# Patient Record
Sex: MALE | Race: WHITE | NOT HISPANIC OR LATINO | Employment: FULL TIME | URBAN - METROPOLITAN AREA
[De-identification: names, ages, dates, MRNs, and addresses within clinical notes are randomized per-mention and may not be internally consistent; named-entity substitution may affect disease eponyms.]

---

## 2017-06-09 ENCOUNTER — TELEPHONE (OUTPATIENT)
Dept: INTERNAL MEDICINE | Facility: CLINIC | Age: 37
End: 2017-06-09

## 2017-06-09 NOTE — TELEPHONE ENCOUNTER
Pt cancelled appt 2 minutes before appt time. appt was added back to schedule due to cancelling appt at too late notice.

## 2017-06-09 NOTE — TELEPHONE ENCOUNTER
----- Message from Lindsay Valentin sent at 6/9/2017  7:28 AM CDT -----  Contact: Patients wife  Pt wife called at 7:27 stating they ashley be approx 15 min late, was told that after that time reschedule will be needed, voiced mercedes/omid

## 2017-06-30 ENCOUNTER — LAB VISIT (OUTPATIENT)
Dept: LAB | Facility: HOSPITAL | Age: 37
End: 2017-06-30
Attending: FAMILY MEDICINE
Payer: COMMERCIAL

## 2017-06-30 ENCOUNTER — OFFICE VISIT (OUTPATIENT)
Dept: INTERNAL MEDICINE | Facility: CLINIC | Age: 37
End: 2017-06-30
Payer: COMMERCIAL

## 2017-06-30 VITALS
BODY MASS INDEX: 27.11 KG/M2 | SYSTOLIC BLOOD PRESSURE: 132 MMHG | DIASTOLIC BLOOD PRESSURE: 84 MMHG | WEIGHT: 204.56 LBS | TEMPERATURE: 99 F | HEIGHT: 73 IN

## 2017-06-30 DIAGNOSIS — R19.7 DIARRHEA, UNSPECIFIED TYPE: Primary | ICD-10-CM

## 2017-06-30 DIAGNOSIS — F90.9 HYPERACTIVITY: ICD-10-CM

## 2017-06-30 DIAGNOSIS — K21.9 GASTROESOPHAGEAL REFLUX DISEASE, ESOPHAGITIS PRESENCE NOT SPECIFIED: ICD-10-CM

## 2017-06-30 DIAGNOSIS — R19.7 DIARRHEA, UNSPECIFIED TYPE: ICD-10-CM

## 2017-06-30 DIAGNOSIS — Z80.0 FAMILY HX OF COLON CANCER: ICD-10-CM

## 2017-06-30 LAB
ALBUMIN SERPL BCP-MCNC: 4.1 G/DL
ALP SERPL-CCNC: 64 U/L
ALT SERPL W/O P-5'-P-CCNC: 22 U/L
ANION GAP SERPL CALC-SCNC: 8 MMOL/L
AST SERPL-CCNC: 23 U/L
BASOPHILS # BLD AUTO: 0.04 K/UL
BASOPHILS NFR BLD: 0.5 %
BILIRUB SERPL-MCNC: 0.3 MG/DL
BUN SERPL-MCNC: 13 MG/DL
CALCIUM SERPL-MCNC: 9.9 MG/DL
CHLORIDE SERPL-SCNC: 105 MMOL/L
CO2 SERPL-SCNC: 27 MMOL/L
CREAT SERPL-MCNC: 1.3 MG/DL
DIFFERENTIAL METHOD: ABNORMAL
EOSINOPHIL # BLD AUTO: 0.3 K/UL
EOSINOPHIL NFR BLD: 2.8 %
ERYTHROCYTE [DISTWIDTH] IN BLOOD BY AUTOMATED COUNT: 12.9 %
EST. GFR  (AFRICAN AMERICAN): >60 ML/MIN/1.73 M^2
EST. GFR  (NON AFRICAN AMERICAN): >60 ML/MIN/1.73 M^2
GLUCOSE SERPL-MCNC: 83 MG/DL
HCT VFR BLD AUTO: 41.9 %
HGB BLD-MCNC: 14.3 G/DL
IGA SERPL-MCNC: 179 MG/DL
LYMPHOCYTES # BLD AUTO: 1.9 K/UL
LYMPHOCYTES NFR BLD: 21.3 %
MCH RBC QN AUTO: 31.8 PG
MCHC RBC AUTO-ENTMCNC: 34.1 %
MCV RBC AUTO: 93 FL
MONOCYTES # BLD AUTO: 0.7 K/UL
MONOCYTES NFR BLD: 8 %
NEUTROPHILS # BLD AUTO: 5.9 K/UL
NEUTROPHILS NFR BLD: 66.8 %
PLATELET # BLD AUTO: 311 K/UL
PMV BLD AUTO: 10.7 FL
POTASSIUM SERPL-SCNC: 3.8 MMOL/L
PROT SERPL-MCNC: 7.4 G/DL
RBC # BLD AUTO: 4.49 M/UL
SODIUM SERPL-SCNC: 140 MMOL/L
TSH SERPL DL<=0.005 MIU/L-ACNC: 1.67 UIU/ML
WBC # BLD AUTO: 8.82 K/UL

## 2017-06-30 PROCEDURE — 36415 COLL VENOUS BLD VENIPUNCTURE: CPT | Mod: PO

## 2017-06-30 PROCEDURE — 99214 OFFICE O/P EST MOD 30 MIN: CPT | Mod: S$GLB,,, | Performed by: FAMILY MEDICINE

## 2017-06-30 PROCEDURE — 99999 PR PBB SHADOW E&M-EST. PATIENT-LVL III: CPT | Mod: PBBFAC,,, | Performed by: FAMILY MEDICINE

## 2017-06-30 PROCEDURE — 80053 COMPREHEN METABOLIC PANEL: CPT

## 2017-06-30 PROCEDURE — 83516 IMMUNOASSAY NONANTIBODY: CPT

## 2017-06-30 PROCEDURE — 85025 COMPLETE CBC W/AUTO DIFF WBC: CPT

## 2017-06-30 PROCEDURE — 82784 ASSAY IGA/IGD/IGG/IGM EACH: CPT

## 2017-06-30 PROCEDURE — 84443 ASSAY THYROID STIM HORMONE: CPT

## 2017-06-30 RX ORDER — PANTOPRAZOLE SODIUM 40 MG/1
40 TABLET, DELAYED RELEASE ORAL DAILY
Qty: 30 TABLET | Refills: 3 | Status: SHIPPED | OUTPATIENT
Start: 2017-06-30 | End: 2017-08-04 | Stop reason: SDUPTHER

## 2017-06-30 NOTE — PROGRESS NOTES
"Subjective:      Patient ID: Eugenie Bennett is a 36 y.o. male.    Chief Complaint: Abdominal Pain; Gastroesophageal Reflux; and Diarrhea    HPI  37 yo male unknown to me here today with his wife (my Patient).  They report that he has chronically had problems with terrible indigestion/acid reflux.  He will burp at times and actually get up balls of mucous that are foul smelling.  He will burp sour smelling as well.  He knows he has to avoid coffee and red sauces.  He has been taking NSAIDs daily for sleep/aches/pains.   He also will get terrible diarrhea off and on.  About 1-2 times a month his stomach gets so bad and the diarrhea that he can't go to work or leave the house.  Occ will have night sweats.  Denies weight loss.  Says he is actually up on weight.  No blood in the stool.    OTC meds for GERD, not helpful.  He has a whole drawer full.  Unsure if certain foods effect the stool, he knows it does effect the GERD.  Of note, mother  at 47 of colon cancer.  He has not been screened.    Past Medical History:   Diagnosis Date    GERD (gastroesophageal reflux disease)      Family History   Problem Relation Age of Onset    Colon cancer Mother       at 47    Cancer Maternal Grandmother      unsure of type    Alcohol abuse Maternal Grandfather      Cancer unknown type     Past Surgical History:   Procedure Laterality Date    ADENOIDECTOMY      TONSILLECTOMY      TYMPANOSTOMY TUBE PLACEMENT      x 2     Social History   Substance Use Topics    Smoking status: Never Smoker    Smokeless tobacco: Current User     Types: Chew      Comment: Smoker for maybe a year    Alcohol use 7.2 oz/week     12 Cans of beer per week       /84 (BP Location: Left arm, Patient Position: Sitting, BP Method: Manual)   Temp 98.8 °F (37.1 °C) (Tympanic)   Ht 6' 1" (1.854 m)   Wt 92.8 kg (204 lb 9.4 oz)   BMI 26.99 kg/m²     Review of Systems   Constitutional: Negative for chills and fever.   Respiratory: Negative.  "   Cardiovascular: Negative.    Gastrointestinal: Positive for abdominal distention, diarrhea and nausea. Negative for anal bleeding and vomiting.   Endocrine: Positive for polydipsia.   Psychiatric/Behavioral:        Reports that he is very hyper, always has been but his work is telling him he needs to be evaluated.  Asking for a referral to do this.     Objective:     Physical Exam   Constitutional: He is oriented to person, place, and time. He appears well-developed and well-nourished.   HENT:   Mouth/Throat: Oropharynx is clear and moist.   Cardiovascular: Normal rate, regular rhythm and normal heart sounds.    Pulmonary/Chest: Effort normal and breath sounds normal. No respiratory distress. He has no wheezes.   Abdominal: Soft. Bowel sounds are normal. He exhibits no distension and no mass. There is no tenderness. There is no guarding.   Neurological: He is alert and oriented to person, place, and time.   Psychiatric: He has a normal mood and affect. His behavior is normal. Judgment and thought content normal.   Pt is full of energy/hyper and likes to talk   Nursing note and vitals reviewed.      No results found for: WBC, HGB, HCT, PLT, CHOL, TRIG, HDL, LDLDIRECT, ALT, AST, NA, K, CL, CREATININE, BUN, CO2, TSH, PSA, INR, GLUF, HGBA1C, MICROALBUR    Assessment:     1. Diarrhea, unspecified type    2. Gastroesophageal reflux disease, esophagitis presence not specified    3. Family hx of colon cancer    4. Hyperactivity       Plan:   Diarrhea, unspecified type  -     CBC auto differential; Future; Expected date: 06/30/2017  -     Comprehensive metabolic panel; Future; Expected date: 06/30/2017  -     TSH; Future; Expected date: 06/30/2017  -     Tissue transglutaminase, IgA; Future; Expected date: 06/30/2017  -     IgA; Future; Expected date: 06/30/2017  -     Ambulatory consult to Gastroenterology    Gastroesophageal reflux disease, esophagitis presence not specified  -     Ambulatory consult to  Gastroenterology    Family hx of colon cancer  -     Ambulatory consult to Gastroenterology    Hyperactivity  -     Ambulatory referral to Psychology    Other orders  -     pantoprazole (PROTONIX) 40 MG tablet; Take 1 tablet (40 mg total) by mouth once daily.  Dispense: 30 tablet; Refill: 3    Start daily Protonix 40mg.  Anti-GERD diet given to him.  Avoid NSAIDs.  Use melatonin for sleep.  Check CBC/CmP/TSH/celiac panel  Given his length of time with symptoms and fam hx, needs GI consultation for further evaluation and likely EGD/Cscope.  Referral to Psychology for evaluation  F/u 6 wks

## 2017-06-30 NOTE — PATIENT INSTRUCTIONS
Tips to Control Acid Reflux    To control acid reflux, youll need to make some basic diet and lifestyle changes. The simple steps outlined below may be all youll need to ease discomfort.  Watch what you eat  · Avoid fatty foods and spicy foods.  · Eat fewer acidic foods, such as citrus and tomato-based foods. These can increase symptoms.  · Limit drinking alcohol, caffeine, and fizzy beverages. All increase acid reflux.  · Try limiting chocolate, peppermint, and spearmint. These can worsen acid reflux in some people.  Watch when you eat  · Avoid lying down for 3 hours after eating.  · Do not snack before going to bed.  Raise your head  Raising your head and upper body by 4 to 6 inches helps limit reflux when youre lying down. Put blocks under the head of your bed frame to raise it.  Other changes  · Lose weight, if you need to  · Dont exercise near bedtime  · Avoid tight-fitting clothes  · Limit aspirin and ibuprofen  · Stop smoking   Date Last Reviewed: 7/1/2016 © 2000-2016 [employer]. [address] [place] [address]. All rights reserved. This information is not intended as a substitute for professional medical care. Always follow your healthcare professional's instructions.        Medicines for Acid Reflux  Your healthcare provider has told you that you have acid reflux. This condition causes stomach acid to wash up into your throat. For most people, acid reflux is troubling but not dangerous. But left untreated, acid reflux sometimes damages the esophagus. Medicines can help control acid reflux and limit your risk of future problems.  Medicines for acid reflux  Your healthcare provider may prescribe medicine to help treat your acid reflux. Medicine will be based on your symptoms and any test results. Your provider will explain how to take your medicine. You will also be told about possible side effects.  Reducing stomach acid  Your provider may suggest antacids that you can buy  over the counter. Antacids can give fast relief. Or you may be told to take a type of medicine called H2 blockers. These are available over the counter and by prescription (for higher doses).  Blocking stomach acid  In more severe cases, your healthcare provider may suggest stronger medicines such as proton pump inhibitors (PPIs). These keep the stomach from making acid. They are often prescribed for long-term use.  Other medicines  In some cases medicines to reduce or block stomach acid may not work. Then you may be switched to another type of medicine that helps your stomach empty better.     Date Last Reviewed: 10/1/2016  © 9853-6877 [employer]. [address] [place] [address], [place] [address]. All rights reserved. This information is not intended as a substitute for professional medical care. Always follow your healthcare professional's instructions.

## 2017-07-06 ENCOUNTER — HOSPITAL ENCOUNTER (OUTPATIENT)
Dept: RADIOLOGY | Facility: HOSPITAL | Age: 37
Discharge: HOME | End: 2017-07-06
Attending: INTERNAL MEDICINE
Payer: COMMERCIAL

## 2017-07-06 ENCOUNTER — INITIAL CONSULT (OUTPATIENT)
Dept: GASTROENTEROLOGY | Facility: CLINIC | Age: 37
End: 2017-07-06
Payer: COMMERCIAL

## 2017-07-06 VITALS
BODY MASS INDEX: 27.2 KG/M2 | DIASTOLIC BLOOD PRESSURE: 80 MMHG | HEART RATE: 92 BPM | SYSTOLIC BLOOD PRESSURE: 130 MMHG | HEIGHT: 73 IN | WEIGHT: 205.25 LBS

## 2017-07-06 DIAGNOSIS — R10.33 PERIUMBILICAL ABDOMINAL PAIN: Primary | ICD-10-CM

## 2017-07-06 DIAGNOSIS — K59.00 CONSTIPATION, UNSPECIFIED CONSTIPATION TYPE: ICD-10-CM

## 2017-07-06 DIAGNOSIS — R10.33 PERIUMBILICAL ABDOMINAL PAIN: ICD-10-CM

## 2017-07-06 DIAGNOSIS — R14.0 FLATULENCE/GAS PAIN/BELCHING: ICD-10-CM

## 2017-07-06 DIAGNOSIS — Z80.0 FH: COLON CANCER IN RELATIVE <50 YEARS OLD: ICD-10-CM

## 2017-07-06 DIAGNOSIS — R12 HEARTBURN: ICD-10-CM

## 2017-07-06 PROCEDURE — 99999 PR PBB SHADOW E&M-EST. PATIENT-LVL III: CPT | Mod: PBBFAC,,, | Performed by: INTERNAL MEDICINE

## 2017-07-06 PROCEDURE — 74020 XR ABDOMEN FLAT AND ERECT: CPT | Mod: TC

## 2017-07-06 PROCEDURE — 74020 XR ABDOMEN FLAT AND ERECT: CPT | Mod: 26,,, | Performed by: RADIOLOGY

## 2017-07-06 PROCEDURE — 99203 OFFICE O/P NEW LOW 30 MIN: CPT | Mod: S$GLB,,, | Performed by: INTERNAL MEDICINE

## 2017-07-06 RX ORDER — SODIUM, POTASSIUM,MAG SULFATES 17.5-3.13G
SOLUTION, RECONSTITUTED, ORAL ORAL
Qty: 254 ML | Refills: 0 | Status: SHIPPED | OUTPATIENT
Start: 2017-07-06 | End: 2017-08-04 | Stop reason: ALTCHOICE

## 2017-07-06 NOTE — PROGRESS NOTES
Subjective:       Patient ID: Eugenie Bennett is a 36 y.o. male.    Chief Complaint: Emesis; Diarrhea; Abdominal Pain; and Gas    Eleni-umbilical abdominal pain on and off for the last year. When it comes it may last a day or so; and then it may go away for a month. There is acid belching with a raunchy taste to it buy the patient's description. There is lots of belching and foul gas passage. He was seen in the ED in [facility] with these symptoms and on KUB showed large stool volume. He states he has BMs once/week. The BMs can be multiple, all day or once weekly between episodes. There has been no BRBPR or melena. There has been no anorexia or weight loss. There is no aversion to the sight or smell of food. There is no early satiety.     There is a FH of colon cancer in his mother at age 47.  He has a history of use of frequent Ibuprofen for headaches and body aches.       Review of Systems   Constitutional: Positive for chills. Negative for activity change, appetite change, diaphoresis, fatigue, fever and unexpected weight change.   HENT: Negative for congestion, ear discharge, facial swelling, hearing loss, nosebleeds, postnasal drip, sinus pressure, sneezing, tinnitus, trouble swallowing and voice change.    Eyes: Positive for photophobia. Negative for redness and visual disturbance.   Respiratory: Negative for cough, chest tightness, shortness of breath and wheezing.    Cardiovascular: Negative for chest pain and palpitations.   Gastrointestinal: Positive for abdominal pain, constipation and diarrhea. Negative for abdominal distention, blood in stool, nausea, rectal pain and vomiting.        Heartburn   Genitourinary: Negative for difficulty urinating, discharge, dysuria, flank pain, frequency, hematuria, scrotal swelling, testicular pain and urgency.   Musculoskeletal: Negative for arthralgias, back pain, gait problem, joint swelling, myalgias and neck stiffness.   Skin: Negative for color change, pallor, rash and  wound.   Neurological: Positive for headaches. Negative for dizziness, tremors, seizures, syncope, facial asymmetry, speech difficulty, weakness, light-headedness and numbness.   Hematological: Negative for adenopathy. Does not bruise/bleed easily.   Psychiatric/Behavioral: Negative for agitation, confusion, hallucinations, sleep disturbance and suicidal ideas.       Objective:      Physical Exam   Constitutional: He is oriented to person, place, and time. He appears well-developed and well-nourished. No distress.   HENT:   Head: Normocephalic and atraumatic.   Nose: Nose normal.   Mouth/Throat: Oropharynx is clear and moist. No oropharyngeal exudate.   Eyes: Conjunctivae are normal. Pupils are equal, round, and reactive to light. No scleral icterus.   Neck: Normal range of motion. Neck supple. No thyromegaly present.   Cardiovascular: Normal rate and regular rhythm.  Exam reveals no gallop and no friction rub.    No murmur heard.  Pulmonary/Chest: Effort normal and breath sounds normal. No respiratory distress. He has no wheezes. He has no rales.   Abdominal: Soft. Bowel sounds are normal. He exhibits no distension and no mass. There is no tenderness. There is no rebound and no guarding.   Musculoskeletal: He exhibits no edema or tenderness.   Lymphadenopathy:     He has no cervical adenopathy.   Neurological: He is alert and oriented to person, place, and time. He exhibits normal muscle tone. Coordination normal.   Skin: Skin is warm. No rash noted. He is not diaphoretic.   Psychiatric: He has a normal mood and affect. His behavior is normal. Judgment and thought content normal.   Vitals reviewed.      Assessment:     Periumbilical abdominal pain   Heartburn   Flatulence and belching   Constipation   Family history of colon cancer  No diagnosis found.    Plan:     Flat and upright abdomen    EGD and colonoscopy

## 2017-07-06 NOTE — LETTER
July 13, 2017      Bebo Steve MD  [address] [place] [address]           [facility]  [address] [place] [id number]  Phone: [phone]  Fax: [phone]          Patient: Eugenie Bennett   MR Number: [id number]   YOB: 1980   Date of Visit: 7/6/2017       Dear Dr. Bebo Steve:    Thank you for referring Eugenie Bennett to me for evaluation. Attached you will find relevant portions of my assessment and plan of care.    If you have questions, please do not hesitate to call me. I look forward to following Eugenie Bennett along with you.    Sincerely,    Connie Kathleen  CC:  No Recipients    If you would like to receive this communication electronically, please contact [email or web address] or [phone] to request more information on [employer] Link access.    For providers and/or their staff who would like to refer a patient to [facility], please contact us through our one-stop-shop provider referral line, Luly Toribio, at [phone].    If you feel you have received this communication in error or would no longer like to receive these types of communications, please e-mail [email or web address]

## 2017-07-14 LAB — TTG IGA SER IA-ACNC: 6 UNITS

## 2017-08-04 ENCOUNTER — PATIENT MESSAGE (OUTPATIENT)
Dept: INTERNAL MEDICINE | Facility: CLINIC | Age: 37
End: 2017-08-04

## 2017-08-04 ENCOUNTER — OFFICE VISIT (OUTPATIENT)
Dept: INTERNAL MEDICINE | Facility: CLINIC | Age: 37
End: 2017-08-04
Payer: COMMERCIAL

## 2017-08-04 ENCOUNTER — TELEPHONE (OUTPATIENT)
Dept: INTERNAL MEDICINE | Facility: CLINIC | Age: 37
End: 2017-08-04

## 2017-08-04 VITALS
HEIGHT: 73 IN | WEIGHT: 206.38 LBS | BODY MASS INDEX: 27.35 KG/M2 | TEMPERATURE: 99 F | DIASTOLIC BLOOD PRESSURE: 82 MMHG | SYSTOLIC BLOOD PRESSURE: 120 MMHG

## 2017-08-04 DIAGNOSIS — K21.9 GASTROESOPHAGEAL REFLUX DISEASE, ESOPHAGITIS PRESENCE NOT SPECIFIED: ICD-10-CM

## 2017-08-04 DIAGNOSIS — F90.2 ATTENTION DEFICIT HYPERACTIVITY DISORDER (ADHD), COMBINED TYPE: Primary | ICD-10-CM

## 2017-08-04 PROCEDURE — 99213 OFFICE O/P EST LOW 20 MIN: CPT | Mod: S$GLB,,, | Performed by: FAMILY MEDICINE

## 2017-08-04 PROCEDURE — 3008F BODY MASS INDEX DOCD: CPT | Mod: S$GLB,,, | Performed by: FAMILY MEDICINE

## 2017-08-04 PROCEDURE — 99999 PR PBB SHADOW E&M-EST. PATIENT-LVL III: CPT | Mod: PBBFAC,,, | Performed by: FAMILY MEDICINE

## 2017-08-04 RX ORDER — PANTOPRAZOLE SODIUM 40 MG/1
40 TABLET, DELAYED RELEASE ORAL DAILY
Qty: 30 TABLET | Refills: 6 | Status: SHIPPED | OUTPATIENT
Start: 2017-08-04 | End: 2018-10-19

## 2017-08-04 RX ORDER — LISDEXAMFETAMINE DIMESYLATE 30 MG/1
30 CAPSULE ORAL EVERY MORNING
Qty: 30 CAPSULE | Refills: 0 | Status: SHIPPED | OUTPATIENT
Start: 2017-08-04 | End: 2017-08-07

## 2017-08-04 NOTE — TELEPHONE ENCOUNTER
----- Message from Shanita Mario sent at 8/4/2017  2:28 PM CDT -----  Contact: patient  Calling concerning the medication prescribed is too expensive and calling back per provider. Please call patient @ [phone]. Thanks,. Claribel Hilton in [place] @ [phone]

## 2017-08-04 NOTE — PROGRESS NOTES
"Subjective:      Patient ID: Eugenie Bennett is a 37 y.o. male.    Chief Complaint: F/U GERD and ADHD    HPI  36 yo male here for f/u.  [facility] and had [facility] evaluation.  Found to be consistent with ADHD and no other emotional issues.  Recommendation is to trial stimulant medication.  Pt is open to idea.  PPI helping.  Saw GI.  Needs EGD/Cscope but co-pay is $3000 and he can't afford that right now.  Stomach pain has been ok lately on PPI.  Loose stool.    Past Medical History:   Diagnosis Date    GERD (gastroesophageal reflux disease)      Family History   Problem Relation Age of Onset    Colon cancer Mother       at 47    Cancer Maternal Grandmother      unsure of type    Alcohol abuse Maternal Grandfather      Cancer unknown type     Past Surgical History:   Procedure Laterality Date    ADENOIDECTOMY      TONSILLECTOMY      TYMPANOSTOMY TUBE PLACEMENT      x 2     Social History   Substance Use Topics    Smoking status: Never Smoker    Smokeless tobacco: Current User     Types: Chew      Comment: Smoker for maybe a year    Alcohol use No       /82 (BP Location: Left arm, Patient Position: Sitting, BP Method: Manual)   Temp 98.8 °F (37.1 °C) (Tympanic)   Ht 6' 1" (1.854 m)   Wt 93.6 kg (206 lb 5.6 oz)   BMI 27.22 kg/m²     Review of Systems   All other systems reviewed and are negative.    Objective:     Physical Exam   Constitutional: He is oriented to person, place, and time. He appears well-developed and well-nourished.   Cardiovascular: Normal rate, regular rhythm and normal heart sounds.    Pulmonary/Chest: Effort normal and breath sounds normal. No respiratory distress. He has no wheezes.   Abdominal: Soft. Bowel sounds are normal. He exhibits no distension. There is no tenderness. There is no guarding.   Neurological: He is alert and oriented to person, place, and time.   Psychiatric: He has a normal mood and affect. His behavior is normal. Judgment and thought content " normal.   Nursing note and vitals reviewed.      Lab Results   Component Value Date    WBC 8.82 06/30/2017    HGB 14.3 06/30/2017    HCT 41.9 06/30/2017     06/30/2017    ALT 22 06/30/2017    AST 23 06/30/2017     06/30/2017    K 3.8 06/30/2017     06/30/2017    CREATININE 1.3 06/30/2017    BUN 13 06/30/2017    CO2 27 06/30/2017    TSH 1.674 06/30/2017       Assessment:     1. Attention deficit hyperactivity disorder (ADHD), combined type    2. Gastroesophageal reflux disease, esophagitis presence not specified       Plan:   Attention deficit hyperactivity disorder (ADHD), combined type    Gastroesophageal reflux disease, esophagitis presence not specified    Other orders  -     pantoprazole (PROTONIX) 40 MG tablet; Take 1 tablet (40 mg total) by mouth once daily.  Dispense: 30 tablet; Refill: 6  -     lisdexamfetamine (VYVANSE) 30 MG capsule; Take 1 capsule (30 mg total) by mouth every morning.  Dispense: 30 capsule; Refill: 0    Cont PPI daily, refill.  Endoscopy needs to be done when pt can afford/schedule it.  Reviewed [facility] Evaluation.  Will trial dose of Vyvanse.  Discussed potential med SE, pt understands.  F/U 1 mos

## 2017-08-05 ENCOUNTER — PATIENT MESSAGE (OUTPATIENT)
Dept: GASTROENTEROLOGY | Facility: CLINIC | Age: 37
End: 2017-08-05

## 2017-08-07 ENCOUNTER — TELEPHONE (OUTPATIENT)
Dept: INTERNAL MEDICINE | Facility: CLINIC | Age: 37
End: 2017-08-07

## 2017-08-07 ENCOUNTER — PATIENT MESSAGE (OUTPATIENT)
Dept: INTERNAL MEDICINE | Facility: CLINIC | Age: 37
End: 2017-08-07

## 2017-08-07 RX ORDER — DEXTROAMPHETAMINE SACCHARATE, AMPHETAMINE ASPARTATE, DEXTROAMPHETAMINE SULFATE AND AMPHETAMINE SULFATE 5; 5; 5; 5 MG/1; MG/1; MG/1; MG/1
20 TABLET ORAL 2 TIMES DAILY
Qty: 60 TABLET | Refills: 0 | Status: SHIPPED | OUTPATIENT
Start: 2017-08-07 | End: 2017-09-01 | Stop reason: SDUPTHER

## 2017-08-07 NOTE — TELEPHONE ENCOUNTER
----- Message from Griselda Richard sent at 8/7/2017 12:22 PM CDT -----  Please call pt back at [phone] in regards to a prescription that he can't afford....need to change to something else.

## 2017-09-01 ENCOUNTER — PATIENT MESSAGE (OUTPATIENT)
Dept: INTERNAL MEDICINE | Facility: CLINIC | Age: 37
End: 2017-09-01

## 2017-09-01 ENCOUNTER — OFFICE VISIT (OUTPATIENT)
Dept: INTERNAL MEDICINE | Facility: CLINIC | Age: 37
End: 2017-09-01
Payer: COMMERCIAL

## 2017-09-01 VITALS
HEART RATE: 76 BPM | WEIGHT: 199.06 LBS | HEIGHT: 75 IN | SYSTOLIC BLOOD PRESSURE: 124 MMHG | DIASTOLIC BLOOD PRESSURE: 82 MMHG | TEMPERATURE: 98 F | BODY MASS INDEX: 24.75 KG/M2

## 2017-09-01 DIAGNOSIS — F90.2 ATTENTION DEFICIT HYPERACTIVITY DISORDER (ADHD), COMBINED TYPE: Primary | ICD-10-CM

## 2017-09-01 PROCEDURE — 3008F BODY MASS INDEX DOCD: CPT | Mod: S$GLB,,, | Performed by: FAMILY MEDICINE

## 2017-09-01 PROCEDURE — 99999 PR PBB SHADOW E&M-EST. PATIENT-LVL III: CPT | Mod: PBBFAC,,, | Performed by: FAMILY MEDICINE

## 2017-09-01 PROCEDURE — 99213 OFFICE O/P EST LOW 20 MIN: CPT | Mod: S$GLB,,, | Performed by: FAMILY MEDICINE

## 2017-09-01 RX ORDER — DEXTROAMPHETAMINE SACCHARATE, AMPHETAMINE ASPARTATE, DEXTROAMPHETAMINE SULFATE AND AMPHETAMINE SULFATE 5; 5; 5; 5 MG/1; MG/1; MG/1; MG/1
60 TABLET ORAL DAILY
Qty: 90 TABLET | Refills: 0 | Status: SHIPPED | OUTPATIENT
Start: 2017-09-01 | End: 2017-09-01 | Stop reason: SDUPTHER

## 2017-09-01 RX ORDER — DEXTROAMPHETAMINE SACCHARATE, AMPHETAMINE ASPARTATE, DEXTROAMPHETAMINE SULFATE AND AMPHETAMINE SULFATE 5; 5; 5; 5 MG/1; MG/1; MG/1; MG/1
60 TABLET ORAL DAILY
Qty: 90 TABLET | Refills: 0 | Status: SHIPPED | OUTPATIENT
Start: 2017-09-01 | End: 2017-09-26 | Stop reason: SDUPTHER

## 2017-09-01 NOTE — TELEPHONE ENCOUNTER
----- Message from Elida Ariza sent at 9/1/2017  1:55 PM CDT -----  Pt is requesting a call from nurse in regards to his ADHD medication and stomach medication.        Please call pt back at [phone]             [facility] - [place] - [address]  [address] [place] [address]  Phone: [phone] Fax: [phone]

## 2017-09-01 NOTE — TELEPHONE ENCOUNTER
Pt says, the adderall is not available at [facility]. [facility] [address]

## 2017-09-01 NOTE — PROGRESS NOTES
"Subjective:      Patient ID: Eugenie Bennett is a 37 y.o. male.    Chief Complaint: Follow-up (1 month )    HPI  36 yo male here for f/u on ADHD.  Reports seeing improvement on the Adderall 20mg bid.  We had tried to do the Vyvanse and Adderall XR, not covered and too expensive.  Pt finds the 7 am wears off by 11 and the 12 dose around 2.  Doing well with focus when on it.   No problems/SE to report.  Had some bowel issues last week.  Unable to get scope b/c of cost.      Past Medical History:   Diagnosis Date    GERD (gastroesophageal reflux disease)      Family History   Problem Relation Age of Onset    Colon cancer Mother       at 47    Cancer Maternal Grandmother      unsure of type    Alcohol abuse Maternal Grandfather      Cancer unknown type     Past Surgical History:   Procedure Laterality Date    ADENOIDECTOMY      TONSILLECTOMY      TYMPANOSTOMY TUBE PLACEMENT      x 2     Social History   Substance Use Topics    Smoking status: Never Smoker    Smokeless tobacco: Current User     Types: Chew      Comment: Smoker for maybe a year    Alcohol use No       /82   Pulse 76   Temp 98.3 °F (36.8 °C) (Tympanic)   Ht 6' 3" (1.905 m)   Wt 90.3 kg (199 lb 1.2 oz)   BMI 24.88 kg/m²     Review of Systems   Constitutional: Negative for appetite change.        Down 7 lbs   Respiratory: Negative.    Cardiovascular: Negative.      Objective:     Physical Exam   Constitutional: He is oriented to person, place, and time. He appears well-developed and well-nourished.   Cardiovascular: Normal rate, regular rhythm and normal heart sounds.    Pulmonary/Chest: Effort normal and breath sounds normal. No respiratory distress.   Neurological: He is alert and oriented to person, place, and time.   Skin: Skin is warm and dry.   Psychiatric: He has a normal mood and affect. His behavior is normal. Judgment and thought content normal.   Nursing note and vitals reviewed.      Lab Results   Component Value Date "    WBC 8.82 06/30/2017    HGB 14.3 06/30/2017    HCT 41.9 06/30/2017     06/30/2017    ALT 22 06/30/2017    AST 23 06/30/2017     06/30/2017    K 3.8 06/30/2017     06/30/2017    CREATININE 1.3 06/30/2017    BUN 13 06/30/2017    CO2 27 06/30/2017    TSH 1.674 06/30/2017       Assessment:     1. Attention deficit hyperactivity disorder (ADHD), combined type       Plan:   Attention deficit hyperactivity disorder (ADHD), combined type    Other orders  -     Discontinue: dextroamphetamine-amphetamine (ADDERALL) 20 mg tablet; Take 3 tablets (60 mg total) by mouth once daily.  Dispense: 90 tablet; Refill: 0    Trial of 60mg daily.  Can take tid according to schedule and see if it lasts through the work day.  Protonix daily  Miralax daily for bowels  F/u 2 mos

## 2017-09-01 NOTE — TELEPHONE ENCOUNTER
----- Message from Shanita Mario sent at 9/1/2017  2:58 PM CDT -----  Contact: patient  States the RX for Adder all is not available that this time and would like to have it transferred to [facility]. Please call patient ASAP today URGENT!! @ [phone]. Thanks, lynnette

## 2017-09-25 ENCOUNTER — PATIENT MESSAGE (OUTPATIENT)
Dept: INTERNAL MEDICINE | Facility: CLINIC | Age: 37
End: 2017-09-25

## 2017-09-26 RX ORDER — DEXTROAMPHETAMINE SACCHARATE, AMPHETAMINE ASPARTATE, DEXTROAMPHETAMINE SULFATE AND AMPHETAMINE SULFATE 5; 5; 5; 5 MG/1; MG/1; MG/1; MG/1
60 TABLET ORAL DAILY
Qty: 90 TABLET | Refills: 0 | Status: SHIPPED | OUTPATIENT
Start: 2017-09-26 | End: 2017-09-27 | Stop reason: SDUPTHER

## 2017-09-27 ENCOUNTER — PATIENT MESSAGE (OUTPATIENT)
Dept: INTERNAL MEDICINE | Facility: CLINIC | Age: 37
End: 2017-09-27

## 2017-09-27 RX ORDER — DEXTROAMPHETAMINE SACCHARATE, AMPHETAMINE ASPARTATE, DEXTROAMPHETAMINE SULFATE AND AMPHETAMINE SULFATE 5; 5; 5; 5 MG/1; MG/1; MG/1; MG/1
60 TABLET ORAL DAILY
Qty: 90 TABLET | Refills: 0 | Status: SHIPPED | OUTPATIENT
Start: 2017-09-27 | End: 2017-09-29 | Stop reason: SDUPTHER

## 2017-09-28 ENCOUNTER — PATIENT MESSAGE (OUTPATIENT)
Dept: INTERNAL MEDICINE | Facility: CLINIC | Age: 37
End: 2017-09-28

## 2017-09-29 ENCOUNTER — PATIENT MESSAGE (OUTPATIENT)
Dept: INTERNAL MEDICINE | Facility: CLINIC | Age: 37
End: 2017-09-29

## 2017-09-29 RX ORDER — DEXTROAMPHETAMINE SACCHARATE, AMPHETAMINE ASPARTATE, DEXTROAMPHETAMINE SULFATE AND AMPHETAMINE SULFATE 5; 5; 5; 5 MG/1; MG/1; MG/1; MG/1
60 TABLET ORAL DAILY
Qty: 90 TABLET | Refills: 0 | Status: SHIPPED | OUTPATIENT
Start: 2017-09-29 | End: 2017-11-01 | Stop reason: SDUPTHER

## 2017-11-01 ENCOUNTER — OFFICE VISIT (OUTPATIENT)
Dept: INTERNAL MEDICINE | Facility: CLINIC | Age: 37
End: 2017-11-01
Payer: COMMERCIAL

## 2017-11-01 VITALS
BODY MASS INDEX: 26.01 KG/M2 | WEIGHT: 209.19 LBS | HEART RATE: 90 BPM | DIASTOLIC BLOOD PRESSURE: 70 MMHG | TEMPERATURE: 98 F | HEIGHT: 75 IN | SYSTOLIC BLOOD PRESSURE: 118 MMHG

## 2017-11-01 DIAGNOSIS — M62.830 MUSCLE SPASM OF BACK: ICD-10-CM

## 2017-11-01 DIAGNOSIS — F90.2 ATTENTION DEFICIT HYPERACTIVITY DISORDER (ADHD), COMBINED TYPE: Primary | ICD-10-CM

## 2017-11-01 DIAGNOSIS — Z29.9 PREVENTIVE MEASURE: ICD-10-CM

## 2017-11-01 PROCEDURE — 99213 OFFICE O/P EST LOW 20 MIN: CPT | Mod: S$GLB,,, | Performed by: FAMILY MEDICINE

## 2017-11-01 PROCEDURE — 99999 PR PBB SHADOW E&M-EST. PATIENT-LVL III: CPT | Mod: PBBFAC,,, | Performed by: FAMILY MEDICINE

## 2017-11-01 RX ORDER — DEXTROAMPHETAMINE SACCHARATE, AMPHETAMINE ASPARTATE, DEXTROAMPHETAMINE SULFATE AND AMPHETAMINE SULFATE 5; 5; 5; 5 MG/1; MG/1; MG/1; MG/1
60 TABLET ORAL DAILY
Qty: 90 TABLET | Refills: 0 | Status: SHIPPED | OUTPATIENT
Start: 2017-11-01 | End: 2017-12-01 | Stop reason: SDUPTHER

## 2017-11-01 RX ORDER — DEXTROAMPHETAMINE SACCHARATE, AMPHETAMINE ASPARTATE, DEXTROAMPHETAMINE SULFATE AND AMPHETAMINE SULFATE 5; 5; 5; 5 MG/1; MG/1; MG/1; MG/1
60 TABLET ORAL DAILY
Qty: 90 TABLET | Refills: 0 | Status: CANCELLED | OUTPATIENT
Start: 2017-11-01

## 2017-11-01 RX ORDER — CYCLOBENZAPRINE HCL 10 MG
10 TABLET ORAL NIGHTLY PRN
Qty: 30 TABLET | Refills: 0 | Status: SHIPPED | OUTPATIENT
Start: 2017-11-01 | End: 2017-11-11

## 2017-11-01 NOTE — PROGRESS NOTES
"Subjective:      Patient ID: Eugenie Bennett is a 37 y.o. male.    Chief Complaint: ADHD (f/u)    HPI  36 yo male here for med check.  Last med, different color from [facility] did work as effectively as the med from [facility].  Other than that, no issues.  When taking, no palpitations/SOB/pain.  Was sick last week, now feeling better.  Pulled a lower Left back muscle working, asking for something to help.  Tried heating pad.    Past Medical History:   Diagnosis Date    GERD (gastroesophageal reflux disease)      Family History   Problem Relation Age of Onset    Colon cancer Mother       at 47    Cancer Maternal Grandmother      unsure of type    Alcohol abuse Maternal Grandfather      Cancer unknown type     Past Surgical History:   Procedure Laterality Date    ADENOIDECTOMY      TONSILLECTOMY      TYMPANOSTOMY TUBE PLACEMENT      x 2     Social History   Substance Use Topics    Smoking status: Never Smoker    Smokeless tobacco: Current User     Types: Chew      Comment: Smoker for maybe a year    Alcohol use No       BP (!) 140/70   Pulse 108   Temp 97.6 °F (36.4 °C) (Tympanic)   Ht 6' 3" (1.905 m)   Wt 94.9 kg (209 lb 3.5 oz)   BMI 26.15 kg/m²     Review of Systems   Constitutional: Negative.    Respiratory: Negative.    Cardiovascular: Negative.      Objective:     Physical Exam   Constitutional: He appears well-developed and well-nourished.   Cardiovascular: Normal rate, regular rhythm and normal heart sounds.    Pulmonary/Chest: Effort normal and breath sounds normal. No respiratory distress. He has no wheezes.   Musculoskeletal:        Lumbar back: He exhibits spasm.        Back:    Nursing note and vitals reviewed.      Lab Results   Component Value Date    WBC 8.82 2017    HGB 14.3 2017    HCT 41.9 2017     2017    ALT 22 2017    AST 23 2017     2017    K 3.8 2017     2017    CREATININE 1.3 2017    BUN 13 " 06/30/2017    CO2 27 06/30/2017    TSH 1.674 06/30/2017       Assessment:     1. Attention deficit hyperactivity disorder (ADHD), combined type    2. Muscle spasm of back    3. Preventive measure       Plan:   Attention deficit hyperactivity disorder (ADHD), combined type    Muscle spasm of back    Preventive measure  -     CBC auto differential; Future; Expected date: 02/01/2018  -     Comprehensive metabolic panel; Future; Expected date: 02/01/2018  -     Lipid panel; Future; Expected date: 02/01/2018    Other orders  -     Cancel: dextroamphetamine-amphetamine (ADDERALL) 20 mg tablet; Take 3 tablets (60 mg total) by mouth once daily.  Dispense: 90 tablet; Refill: 0  -     dextroamphetamine-amphetamine (ADDERALL) 20 mg tablet; Take 3 tablets (60 mg total) by mouth once daily.  Dispense: 90 tablet; Refill: 0  -     cyclobenzaprine (FLEXERIL) 10 MG tablet; Take 1 tablet (10 mg total) by mouth nightly as needed for Muscle spasms.  Dispense: 30 tablet; Refill: 0    Refill Adderall today, printed script given to pt to take to [facility]  Flexeril at bedtime PRN, topical creams ok as well  F/u 3 mos with labs

## 2017-11-27 ENCOUNTER — PATIENT MESSAGE (OUTPATIENT)
Dept: INTERNAL MEDICINE | Facility: CLINIC | Age: 37
End: 2017-11-27

## 2017-11-27 RX ORDER — DEXTROAMPHETAMINE SACCHARATE, AMPHETAMINE ASPARTATE, DEXTROAMPHETAMINE SULFATE AND AMPHETAMINE SULFATE 5; 5; 5; 5 MG/1; MG/1; MG/1; MG/1
60 TABLET ORAL DAILY
Qty: 90 TABLET | Refills: 0 | OUTPATIENT
Start: 2017-11-27

## 2017-11-30 RX ORDER — DEXTROAMPHETAMINE SACCHARATE, AMPHETAMINE ASPARTATE, DEXTROAMPHETAMINE SULFATE AND AMPHETAMINE SULFATE 5; 5; 5; 5 MG/1; MG/1; MG/1; MG/1
60 TABLET ORAL DAILY
Qty: 90 TABLET | Refills: 0 | Status: CANCELLED | OUTPATIENT
Start: 2017-11-30

## 2017-12-01 RX ORDER — DEXTROAMPHETAMINE SACCHARATE, AMPHETAMINE ASPARTATE, DEXTROAMPHETAMINE SULFATE AND AMPHETAMINE SULFATE 5; 5; 5; 5 MG/1; MG/1; MG/1; MG/1
60 TABLET ORAL DAILY
Qty: 90 TABLET | Refills: 0 | Status: SHIPPED | OUTPATIENT
Start: 2017-12-01 | End: 2017-12-29 | Stop reason: SDUPTHER

## 2017-12-29 ENCOUNTER — PATIENT MESSAGE (OUTPATIENT)
Dept: INTERNAL MEDICINE | Facility: CLINIC | Age: 37
End: 2017-12-29

## 2017-12-29 RX ORDER — DEXTROAMPHETAMINE SACCHARATE, AMPHETAMINE ASPARTATE, DEXTROAMPHETAMINE SULFATE AND AMPHETAMINE SULFATE 5; 5; 5; 5 MG/1; MG/1; MG/1; MG/1
60 TABLET ORAL DAILY
Qty: 90 TABLET | Refills: 0 | Status: SHIPPED | OUTPATIENT
Start: 2017-12-29 | End: 2018-01-29 | Stop reason: SDUPTHER

## 2018-01-03 ENCOUNTER — TELEPHONE (OUTPATIENT)
Dept: INTERNAL MEDICINE | Facility: CLINIC | Age: 38
End: 2018-01-03

## 2018-01-03 ENCOUNTER — PATIENT MESSAGE (OUTPATIENT)
Dept: INTERNAL MEDICINE | Facility: CLINIC | Age: 38
End: 2018-01-03

## 2018-01-26 ENCOUNTER — OFFICE VISIT (OUTPATIENT)
Dept: INTERNAL MEDICINE | Facility: CLINIC | Age: 38
End: 2018-01-26
Payer: COMMERCIAL

## 2018-01-26 ENCOUNTER — LAB VISIT (OUTPATIENT)
Dept: LAB | Facility: HOSPITAL | Age: 38
End: 2018-01-26
Attending: FAMILY MEDICINE
Payer: COMMERCIAL

## 2018-01-26 VITALS
TEMPERATURE: 98 F | DIASTOLIC BLOOD PRESSURE: 80 MMHG | BODY MASS INDEX: 25.92 KG/M2 | HEART RATE: 74 BPM | SYSTOLIC BLOOD PRESSURE: 118 MMHG | WEIGHT: 201.94 LBS | HEIGHT: 74 IN

## 2018-01-26 DIAGNOSIS — J01.10 ACUTE NON-RECURRENT FRONTAL SINUSITIS: Primary | ICD-10-CM

## 2018-01-26 DIAGNOSIS — Z29.9 PREVENTIVE MEASURE: ICD-10-CM

## 2018-01-26 LAB
ALBUMIN SERPL BCP-MCNC: 4.1 G/DL
ALP SERPL-CCNC: 66 U/L
ALT SERPL W/O P-5'-P-CCNC: 17 U/L
ANION GAP SERPL CALC-SCNC: 11 MMOL/L
AST SERPL-CCNC: 24 U/L
BASOPHILS # BLD AUTO: 0.05 K/UL
BASOPHILS NFR BLD: 0.7 %
BILIRUB SERPL-MCNC: 1.3 MG/DL
BUN SERPL-MCNC: 9 MG/DL
CALCIUM SERPL-MCNC: 9.6 MG/DL
CHLORIDE SERPL-SCNC: 105 MMOL/L
CHOLEST SERPL-MCNC: 188 MG/DL
CHOLEST/HDLC SERPL: 4.2 {RATIO}
CO2 SERPL-SCNC: 23 MMOL/L
CREAT SERPL-MCNC: 1.1 MG/DL
DIFFERENTIAL METHOD: NORMAL
EOSINOPHIL # BLD AUTO: 0.2 K/UL
EOSINOPHIL NFR BLD: 2.5 %
ERYTHROCYTE [DISTWIDTH] IN BLOOD BY AUTOMATED COUNT: 13.1 %
EST. GFR  (AFRICAN AMERICAN): >60 ML/MIN/1.73 M^2
EST. GFR  (NON AFRICAN AMERICAN): >60 ML/MIN/1.73 M^2
GLUCOSE SERPL-MCNC: 80 MG/DL
HCT VFR BLD AUTO: 43.2 %
HDLC SERPL-MCNC: 45 MG/DL
HDLC SERPL: 23.9 %
HGB BLD-MCNC: 14.5 G/DL
IMM GRANULOCYTES # BLD AUTO: 0.02 K/UL
IMM GRANULOCYTES NFR BLD AUTO: 0.3 %
LDLC SERPL CALC-MCNC: 129.2 MG/DL
LYMPHOCYTES # BLD AUTO: 1.9 K/UL
LYMPHOCYTES NFR BLD: 25.9 %
MCH RBC QN AUTO: 30.6 PG
MCHC RBC AUTO-ENTMCNC: 33.6 G/DL
MCV RBC AUTO: 91 FL
MONOCYTES # BLD AUTO: 0.6 K/UL
MONOCYTES NFR BLD: 8.9 %
NEUTROPHILS # BLD AUTO: 4.5 K/UL
NEUTROPHILS NFR BLD: 61.7 %
NONHDLC SERPL-MCNC: 143 MG/DL
NRBC BLD-RTO: 0 /100 WBC
PLATELET # BLD AUTO: 318 K/UL
PMV BLD AUTO: 9.8 FL
POTASSIUM SERPL-SCNC: 3.8 MMOL/L
PROT SERPL-MCNC: 7.4 G/DL
RBC # BLD AUTO: 4.74 M/UL
SODIUM SERPL-SCNC: 139 MMOL/L
TRIGL SERPL-MCNC: 69 MG/DL
WBC # BLD AUTO: 7.21 K/UL

## 2018-01-26 PROCEDURE — 99213 OFFICE O/P EST LOW 20 MIN: CPT | Mod: S$GLB,,, | Performed by: FAMILY MEDICINE

## 2018-01-26 PROCEDURE — 85025 COMPLETE CBC W/AUTO DIFF WBC: CPT

## 2018-01-26 PROCEDURE — 80061 LIPID PANEL: CPT

## 2018-01-26 PROCEDURE — 99999 PR PBB SHADOW E&M-EST. PATIENT-LVL III: CPT | Mod: PBBFAC,,, | Performed by: FAMILY MEDICINE

## 2018-01-26 PROCEDURE — 36415 COLL VENOUS BLD VENIPUNCTURE: CPT | Mod: PO

## 2018-01-26 PROCEDURE — 80053 COMPREHEN METABOLIC PANEL: CPT

## 2018-01-26 RX ORDER — AMOXICILLIN AND CLAVULANATE POTASSIUM 875; 125 MG/1; MG/1
1 TABLET, FILM COATED ORAL EVERY 12 HOURS
Qty: 20 TABLET | Refills: 0 | Status: SHIPPED | OUTPATIENT
Start: 2018-01-26 | End: 2018-02-05

## 2018-01-26 NOTE — PROGRESS NOTES
"Subjective:      Patient ID: Eugenie Bennett is a 37 y.o. male.    Chief Complaint: head congestion/    HPI  38 yo male with head congestion/sinus pressure/pain, blowing out thick green/yellow mucous here.  OTC meds not helping anymore.    No fever/chills  Minimal cough/chest symptoms    Past Medical History:   Diagnosis Date    GERD (gastroesophageal reflux disease)      Family History   Problem Relation Age of Onset    Colon cancer Mother       at 47    Cancer Maternal Grandmother      unsure of type    Alcohol abuse Maternal Grandfather      Cancer unknown type     Past Surgical History:   Procedure Laterality Date    ADENOIDECTOMY      TONSILLECTOMY      TYMPANOSTOMY TUBE PLACEMENT      x 2     Social History   Substance Use Topics    Smoking status: Never Smoker    Smokeless tobacco: Current User     Types: Chew      Comment: Smoker for maybe a year    Alcohol use No       /80   Pulse 74   Temp 97.6 °F (36.4 °C) (Tympanic)   Ht 6' 2.25" (1.886 m)   Wt 91.6 kg (201 lb 15.1 oz)   BMI 25.75 kg/m²     Review of Systems   Constitutional: Negative for chills and fever.   Respiratory: Negative for shortness of breath and wheezing.        Objective:     Physical Exam   Constitutional: He appears well-developed and well-nourished.   HENT:   Right Ear: External ear normal.   Left Ear: External ear normal.   Mouth/Throat: Oropharynx is clear and moist.   +frontal/maxillary tenderness   Eyes: Pupils are equal, round, and reactive to light.   Neck: Normal range of motion. Neck supple.   Cardiovascular: Normal rate, regular rhythm and normal heart sounds.    Pulmonary/Chest: Effort normal and breath sounds normal. No respiratory distress.   Lymphadenopathy:     He has no cervical adenopathy.   Nursing note and vitals reviewed.      Lab Results   Component Value Date    WBC 8.82 2017    HGB 14.3 2017    HCT 41.9 2017     2017    ALT 22 2017    AST 23 2017 "     06/30/2017    K 3.8 06/30/2017     06/30/2017    CREATININE 1.3 06/30/2017    BUN 13 06/30/2017    CO2 27 06/30/2017    TSH 1.674 06/30/2017       Assessment:     1. Acute non-recurrent frontal sinusitis         Plan:     Acute non-recurrent frontal sinusitis    Other orders  -     amoxicillin-clavulanate 875-125mg (AUGMENTIN) 875-125 mg per tablet; Take 1 tablet by mouth every 12 (twelve) hours.  Dispense: 20 tablet; Refill: 0    Start augmentin bid x 10 days  Ok to use decongestants  Plenty of fluids  F/u as scheduled for annual

## 2018-01-29 ENCOUNTER — PATIENT MESSAGE (OUTPATIENT)
Dept: INTERNAL MEDICINE | Facility: CLINIC | Age: 38
End: 2018-01-29

## 2018-01-29 RX ORDER — DEXTROAMPHETAMINE SACCHARATE, AMPHETAMINE ASPARTATE, DEXTROAMPHETAMINE SULFATE AND AMPHETAMINE SULFATE 5; 5; 5; 5 MG/1; MG/1; MG/1; MG/1
60 TABLET ORAL DAILY
Qty: 90 TABLET | Refills: 0 | Status: SHIPPED | OUTPATIENT
Start: 2018-01-29 | End: 2018-02-28 | Stop reason: SDUPTHER

## 2018-02-02 ENCOUNTER — OFFICE VISIT (OUTPATIENT)
Dept: INTERNAL MEDICINE | Facility: CLINIC | Age: 38
End: 2018-02-02
Payer: COMMERCIAL

## 2018-02-02 VITALS
TEMPERATURE: 99 F | DIASTOLIC BLOOD PRESSURE: 78 MMHG | HEART RATE: 86 BPM | WEIGHT: 207.88 LBS | BODY MASS INDEX: 26.68 KG/M2 | SYSTOLIC BLOOD PRESSURE: 132 MMHG | HEIGHT: 74 IN

## 2018-02-02 DIAGNOSIS — K21.9 GASTROESOPHAGEAL REFLUX DISEASE, ESOPHAGITIS PRESENCE NOT SPECIFIED: ICD-10-CM

## 2018-02-02 DIAGNOSIS — Z00.00 ROUTINE PHYSICAL EXAMINATION: Primary | ICD-10-CM

## 2018-02-02 PROCEDURE — 99395 PREV VISIT EST AGE 18-39: CPT | Mod: S$GLB,,, | Performed by: FAMILY MEDICINE

## 2018-02-02 PROCEDURE — 99999 PR PBB SHADOW E&M-EST. PATIENT-LVL II: CPT | Mod: PBBFAC,,, | Performed by: FAMILY MEDICINE

## 2018-02-02 NOTE — PROGRESS NOTES
"Subjective:      Patient ID: Eugenie Bennett is a 37 y.o. male.    Chief Complaint: Annual Exam    HPI  36 yo male here for annual visit.  Has GERD, uses PPI in AM.  Still occ with symptoms.  Doesn't matter what he eats.  On ABx for sinus right now, has a few days left.  Feeling better.  Stable on Adderall.    Past Medical History:   Diagnosis Date    GERD (gastroesophageal reflux disease)      Family History   Problem Relation Age of Onset    Colon cancer Mother       at 47    Cancer Maternal Grandmother      unsure of type    Alcohol abuse Maternal Grandfather      Cancer unknown type     Past Surgical History:   Procedure Laterality Date    ADENOIDECTOMY      TONSILLECTOMY      TYMPANOSTOMY TUBE PLACEMENT      x 2     Social History   Substance Use Topics    Smoking status: Never Smoker    Smokeless tobacco: Current User     Types: Chew      Comment: Smoker for maybe a year    Alcohol use No       /78   Pulse 86   Temp 98.7 °F (37.1 °C) (Tympanic)   Ht 6' 2.25" (1.886 m)   Wt 94.3 kg (207 lb 14.3 oz)   BMI 26.51 kg/m²     Review of Systems   Constitutional: Negative for activity change, appetite change, chills, diaphoresis, fatigue, fever and unexpected weight change.   HENT: Negative for hearing loss and tinnitus.    Eyes: Negative for visual disturbance.   Respiratory: Negative for cough, chest tightness, shortness of breath and wheezing.    Cardiovascular: Negative for chest pain, palpitations and leg swelling.   Gastrointestinal: Negative for abdominal distention, abdominal pain, blood in stool and rectal pain.   Genitourinary: Negative for difficulty urinating, discharge and testicular pain.   Musculoskeletal: Negative for arthralgias and back pain.   Neurological: Negative for dizziness, weakness and headaches.       Objective:     Physical Exam   Constitutional: He is oriented to person, place, and time. He appears well-developed and well-nourished. No distress.   HENT:   Right " Ear: External ear normal.   Left Ear: External ear normal.   Nose: Nose normal.   Mouth/Throat: Oropharynx is clear and moist.   Eyes: Conjunctivae are normal. Pupils are equal, round, and reactive to light.   Neck: Normal range of motion. Neck supple. No thyromegaly present.   Cardiovascular: Normal rate, regular rhythm and normal heart sounds.    No murmur heard.  Pulmonary/Chest: Effort normal and breath sounds normal. No respiratory distress. He has no wheezes.   Abdominal: Soft. Bowel sounds are normal. He exhibits no distension. There is no tenderness. There is no guarding.   Musculoskeletal: Normal range of motion. He exhibits no edema.   Lymphadenopathy:     He has no cervical adenopathy.   Neurological: He is alert and oriented to person, place, and time. No cranial nerve deficit.   Skin: Skin is warm and dry. No rash noted. He is not diaphoretic.   Psychiatric: He has a normal mood and affect. His behavior is normal. Judgment and thought content normal.   Nursing note and vitals reviewed.      Lab Results   Component Value Date    WBC 7.21 01/26/2018    HGB 14.5 01/26/2018    HCT 43.2 01/26/2018     01/26/2018    CHOL 188 01/26/2018    TRIG 69 01/26/2018    HDL 45 01/26/2018    ALT 17 01/26/2018    AST 24 01/26/2018     01/26/2018    K 3.8 01/26/2018     01/26/2018    CREATININE 1.1 01/26/2018    BUN 9 01/26/2018    CO2 23 01/26/2018    TSH 1.674 06/30/2017       Assessment:     1. Routine physical examination         Plan:     Routine physical examination    Reviewed labs  Cont PPI in AM, trial of zantac in PM  GERD diet   Cont ADD meds  Low sodium/low fat diet and exercise  F/u 4 mos

## 2018-03-01 RX ORDER — DEXTROAMPHETAMINE SACCHARATE, AMPHETAMINE ASPARTATE, DEXTROAMPHETAMINE SULFATE AND AMPHETAMINE SULFATE 5; 5; 5; 5 MG/1; MG/1; MG/1; MG/1
60 TABLET ORAL DAILY
Qty: 90 TABLET | Refills: 0 | Status: SHIPPED | OUTPATIENT
Start: 2018-03-01 | End: 2018-03-28 | Stop reason: SDUPTHER

## 2018-03-14 ENCOUNTER — PATIENT MESSAGE (OUTPATIENT)
Dept: INTERNAL MEDICINE | Facility: CLINIC | Age: 38
End: 2018-03-14

## 2018-03-15 DIAGNOSIS — J01.00 ACUTE NON-RECURRENT MAXILLARY SINUSITIS: ICD-10-CM

## 2018-03-15 RX ORDER — FLUTICASONE PROPIONATE 50 MCG
2 SPRAY, SUSPENSION (ML) NASAL DAILY
Qty: 1 BOTTLE | Refills: 0 | Status: SHIPPED | OUTPATIENT
Start: 2018-03-15 | End: 2018-05-14 | Stop reason: SDUPTHER

## 2018-03-28 RX ORDER — DEXTROAMPHETAMINE SACCHARATE, AMPHETAMINE ASPARTATE, DEXTROAMPHETAMINE SULFATE AND AMPHETAMINE SULFATE 5; 5; 5; 5 MG/1; MG/1; MG/1; MG/1
60 TABLET ORAL DAILY
Qty: 90 TABLET | Refills: 0 | Status: SHIPPED | OUTPATIENT
Start: 2018-03-28 | End: 2018-04-26 | Stop reason: SDUPTHER

## 2018-04-23 ENCOUNTER — OFFICE VISIT (OUTPATIENT)
Dept: URGENT CARE | Facility: CLINIC | Age: 38
End: 2018-04-23
Payer: COMMERCIAL

## 2018-04-23 VITALS
OXYGEN SATURATION: 98 % | SYSTOLIC BLOOD PRESSURE: 120 MMHG | WEIGHT: 210.31 LBS | BODY MASS INDEX: 26.99 KG/M2 | HEART RATE: 112 BPM | TEMPERATURE: 98 F | HEIGHT: 74 IN | DIASTOLIC BLOOD PRESSURE: 84 MMHG

## 2018-04-23 DIAGNOSIS — J01.90 ACUTE NON-RECURRENT SINUSITIS, UNSPECIFIED LOCATION: Primary | ICD-10-CM

## 2018-04-23 PROCEDURE — 99214 OFFICE O/P EST MOD 30 MIN: CPT | Mod: S$GLB,,, | Performed by: NURSE PRACTITIONER

## 2018-04-23 PROCEDURE — 99999 PR PBB SHADOW E&M-EST. PATIENT-LVL IV: CPT | Mod: PBBFAC,,, | Performed by: NURSE PRACTITIONER

## 2018-04-23 RX ORDER — AMOXICILLIN AND CLAVULANATE POTASSIUM 875; 125 MG/1; MG/1
1 TABLET, FILM COATED ORAL 2 TIMES DAILY
Qty: 20 TABLET | Refills: 0 | Status: SHIPPED | OUTPATIENT
Start: 2018-04-23 | End: 2018-05-03

## 2018-04-23 RX ORDER — GUAIFENESIN 600 MG/1
1200 TABLET, EXTENDED RELEASE ORAL 2 TIMES DAILY
Qty: 40 TABLET | Refills: 0 | Status: SHIPPED | OUTPATIENT
Start: 2018-04-23 | End: 2018-05-03

## 2018-04-23 RX ORDER — PROMETHAZINE HYDROCHLORIDE AND DEXTROMETHORPHAN HYDROBROMIDE 6.25; 15 MG/5ML; MG/5ML
5 SYRUP ORAL NIGHTLY PRN
Qty: 120 ML | Refills: 0 | Status: SHIPPED | OUTPATIENT
Start: 2018-04-23 | End: 2018-06-04

## 2018-04-23 NOTE — PATIENT INSTRUCTIONS
Acute Sinusitis    Acute sinusitis is irritation and swelling of the sinuses. It is usually caused by a viral infection after a common cold. Your doctor can help you find relief.  What is acute sinusitis?  Sinuses are air-filled spaces in the skull behind the face. They are kept moist and clean by a lining of mucosa. Things such as pollen, smoke, and chemical fumes can irritate the mucosa. It can then swell up. As a response to irritation, the mucosa makes more mucus and other fluids. Tiny hairlike cilia cover the mucosa. Cilia help carry mucus toward the opening of the sinus. Too much mucus may cause the cilia to stop working. This blocks the sinus opening. A buildup of fluid in the sinuses then causes pain and pressure. It can also encourage bacteria to grow in the sinuses.  Common symptoms of acute sinusitis  You may have:  · Facial soreness pain  · Headache  · Fever  · Fluid draining in the back of the throat (postnasal drip)  · Congestion  · Drainage that is thick and colored, instead of clear  · Cough  Diagnosing acute sinusitis  Your doctor will ask about your symptoms and health history. He or she will look at your ear, nose, and throat. You usually won't need to have X-rays taken.    The doctor may take a sample of mucus to check for bacteria. If you have sinusitis that keeps coming back, you may need imaging tests such as X-rays or CAT scans. This will help your doctor check for a structural problem that may be causing the infection.  Treating acute sinusitis  Treatment is aimed at unblocking the sinus opening and helping the cilia work again. You may need to take antihistamine and decongestant medicine. These can reduce inflammation and decrease the amount of fluid your sinuses make. If you have a bacterial infection, you will need to take antibiotic medicine for 10 to 14 days. Take this medicine until it is gone, even if you feel better.  Date Last Reviewed: 10/1/2016  © 1174-4262 [employer] [address] [place] [address]. All rights reserved. This information is not intended as a substitute for professional medical care. Always follow your healthcare professional's instructions.

## 2018-04-23 NOTE — PROGRESS NOTES
"Subjective:       Patient ID: Eugenie Bennett is a 37 y.o. male.    Chief Complaint: Sinus Problem    Sinus Problem   This is a new problem. Episode onset: 4 days. The problem has been gradually worsening since onset. There has been no fever. The pain is mild. Associated symptoms include congestion, coughing, ear pain and sinus pressure. Pertinent negatives include no chills, diaphoresis, headaches, hoarse voice, neck pain, shortness of breath, sneezing, sore throat or swollen glands. Treatments tried: zyrtec dSandrita The treatment provided mild relief.       /84 (BP Location: Right arm, Patient Position: Sitting, BP Method: Large (Manual))   Pulse (!) 112   Temp 98.3 °F (36.8 °C) (Tympanic)   Ht 6' 2" (1.88 m)   Wt 95.4 kg (210 lb 5.1 oz)   SpO2 98%   BMI 27.00 kg/m²     Review of Systems   Constitutional: Negative for activity change, appetite change, chills, diaphoresis, fatigue, fever and unexpected weight change.   HENT: Positive for congestion, ear pain, postnasal drip, rhinorrhea, sinus pain and sinus pressure. Negative for dental problem, drooling, ear discharge, facial swelling, hearing loss, hoarse voice, mouth sores, nosebleeds, sneezing, sore throat, tinnitus, trouble swallowing and voice change.    Eyes: Negative for photophobia, pain, discharge, redness, itching and visual disturbance.   Respiratory: Positive for cough. Negative for apnea, choking, chest tightness, shortness of breath and wheezing.    Cardiovascular: Negative for chest pain, palpitations and leg swelling.   Gastrointestinal: Negative for abdominal distention, abdominal pain, anal bleeding, blood in stool, constipation, diarrhea, nausea, rectal pain and vomiting.   Endocrine: Negative.    Genitourinary: Negative for decreased urine volume, difficulty urinating, dysuria, hematuria and urgency.   Musculoskeletal: Negative for arthralgias, gait problem, joint swelling, myalgias, neck pain and neck stiffness.   Skin: Negative " for color change, pallor, rash and wound.   Allergic/Immunologic: Negative for environmental allergies, food allergies and immunocompromised state.   Neurological: Negative for dizziness, tremors, seizures, syncope, facial asymmetry, speech difficulty, weakness, light-headedness, numbness and headaches.   Hematological: Negative for adenopathy. Does not bruise/bleed easily.   Psychiatric/Behavioral: Negative for agitation, behavioral problems, confusion, decreased concentration, dysphoric mood, hallucinations and sleep disturbance. The patient is not nervous/anxious and is not hyperactive.        Objective:      Physical Exam   Constitutional: He is oriented to person, place, and time. He appears well-developed and well-nourished.   HENT:   Head: Normocephalic and atraumatic.   Right Ear: Tympanic membrane, external ear and ear canal normal. No decreased hearing is noted.   Left Ear: Tympanic membrane, external ear and ear canal normal. No decreased hearing is noted.   Nose: Mucosal edema and rhinorrhea present. No sinus tenderness. No epistaxis. Right sinus exhibits maxillary sinus tenderness and frontal sinus tenderness. Left sinus exhibits maxillary sinus tenderness and frontal sinus tenderness.   Mouth/Throat: Uvula is midline, oropharynx is clear and moist and mucous membranes are normal. No oropharyngeal exudate, posterior oropharyngeal edema, posterior oropharyngeal erythema or tonsillar abscesses.   Severe sinus pain to palpation    Eyes: Conjunctivae are normal. Right eye exhibits no discharge. Left eye exhibits no discharge.   Neck: Normal range of motion.   Cardiovascular: Normal rate, regular rhythm and normal heart sounds.    No murmur heard.  Pulmonary/Chest: Effort normal. No accessory muscle usage. No respiratory distress. He has no decreased breath sounds. He has no wheezes. He has no rhonchi. He has no rales. He exhibits no tenderness.   Abdominal: Soft. There is no tenderness.   Musculoskeletal:  Normal range of motion. He exhibits no edema.   Neurological: He is alert and oriented to person, place, and time.   Skin: Skin is warm and dry. No erythema.   Psychiatric: He has a normal mood and affect. His behavior is normal.   Nursing note and vitals reviewed.      Assessment:       1. Acute non-recurrent sinusitis, unspecified location        Plan:       Eugenie was seen today for sinus problem.    Diagnoses and all orders for this visit:    Acute non-recurrent sinusitis, unspecified location  -     amoxicillin-clavulanate 875-125mg (AUGMENTIN) 875-125 mg per tablet; Take 1 tablet by mouth 2 (two) times daily.  -     guaiFENesin (MUCINEX) 600 mg 12 hr tablet; Take 2 tablets (1,200 mg total) by mouth 2 (two) times daily.  -     promethazine-dextromethorphan (PROMETHAZINE-DM) 6.25-15 mg/5 mL Syrp; Take 5 mLs by mouth nightly as needed (Cough).    Rest  Drink plenty of clear fluids  Nasal saline spray to clear nasal drainage and help with nasal congestion  Zyrtec or Claritin to help dry mucus and post nasal drip  Mucinex or Mucinex DM for cough and chest congestion  Tylenol or Ibuprofen for fever, headache and body aches  Warm salt water gargles for throat comfort  Chloraseptic spray or lozenges for throat comfort  See Primary Care Physician or go to ER if symptoms worsen of fail to improve with treatment.

## 2018-04-27 RX ORDER — DEXTROAMPHETAMINE SACCHARATE, AMPHETAMINE ASPARTATE, DEXTROAMPHETAMINE SULFATE AND AMPHETAMINE SULFATE 5; 5; 5; 5 MG/1; MG/1; MG/1; MG/1
60 TABLET ORAL DAILY
Qty: 90 TABLET | Refills: 0 | Status: SHIPPED | OUTPATIENT
Start: 2018-04-27 | End: 2018-05-29 | Stop reason: SDUPTHER

## 2018-05-14 DIAGNOSIS — J01.00 ACUTE NON-RECURRENT MAXILLARY SINUSITIS: ICD-10-CM

## 2018-05-15 RX ORDER — FLUTICASONE PROPIONATE 50 MCG
SPRAY, SUSPENSION (ML) NASAL
Qty: 16 G | Refills: 6 | Status: SHIPPED | OUTPATIENT
Start: 2018-05-15 | End: 2018-10-19

## 2018-05-21 ENCOUNTER — PATIENT OUTREACH (OUTPATIENT)
Dept: ADMINISTRATIVE | Facility: HOSPITAL | Age: 38
End: 2018-05-21

## 2018-05-29 RX ORDER — DEXTROAMPHETAMINE SACCHARATE, AMPHETAMINE ASPARTATE, DEXTROAMPHETAMINE SULFATE AND AMPHETAMINE SULFATE 5; 5; 5; 5 MG/1; MG/1; MG/1; MG/1
60 TABLET ORAL DAILY
Qty: 90 TABLET | Refills: 0 | Status: SHIPPED | OUTPATIENT
Start: 2018-05-29 | End: 2018-06-28 | Stop reason: SDUPTHER

## 2018-06-04 ENCOUNTER — HOSPITAL ENCOUNTER (OUTPATIENT)
Dept: RADIOLOGY | Facility: HOSPITAL | Age: 38
Discharge: HOME | End: 2018-06-04
Attending: FAMILY MEDICINE
Payer: COMMERCIAL

## 2018-06-04 ENCOUNTER — OFFICE VISIT (OUTPATIENT)
Dept: INTERNAL MEDICINE | Facility: CLINIC | Age: 38
End: 2018-06-04
Payer: COMMERCIAL

## 2018-06-04 VITALS
WEIGHT: 208.31 LBS | SYSTOLIC BLOOD PRESSURE: 122 MMHG | TEMPERATURE: 99 F | DIASTOLIC BLOOD PRESSURE: 78 MMHG | BODY MASS INDEX: 26.73 KG/M2 | HEART RATE: 88 BPM | HEIGHT: 74 IN

## 2018-06-04 DIAGNOSIS — G89.29 CHRONIC RIGHT SHOULDER PAIN: ICD-10-CM

## 2018-06-04 DIAGNOSIS — M25.511 CHRONIC RIGHT SHOULDER PAIN: ICD-10-CM

## 2018-06-04 DIAGNOSIS — J32.9 RECURRENT SINUSITIS: ICD-10-CM

## 2018-06-04 DIAGNOSIS — Z30.09 ENCOUNTER FOR VASECTOMY ASSESSMENT: ICD-10-CM

## 2018-06-04 DIAGNOSIS — F90.2 ATTENTION DEFICIT HYPERACTIVITY DISORDER (ADHD), COMBINED TYPE: Primary | ICD-10-CM

## 2018-06-04 PROCEDURE — 99214 OFFICE O/P EST MOD 30 MIN: CPT | Mod: S$GLB,,, | Performed by: FAMILY MEDICINE

## 2018-06-04 PROCEDURE — 3008F BODY MASS INDEX DOCD: CPT | Mod: CPTII,S$GLB,, | Performed by: FAMILY MEDICINE

## 2018-06-04 PROCEDURE — 99999 PR PBB SHADOW E&M-EST. PATIENT-LVL III: CPT | Mod: PBBFAC,,, | Performed by: FAMILY MEDICINE

## 2018-06-04 PROCEDURE — 73030 X-RAY EXAM OF SHOULDER: CPT | Mod: TC,FY,PO,RT

## 2018-06-04 PROCEDURE — 73030 X-RAY EXAM OF SHOULDER: CPT | Mod: 26,RT,, | Performed by: RADIOLOGY

## 2018-06-04 RX ORDER — METHYLPREDNISOLONE 4 MG/1
TABLET ORAL
Qty: 1 PACKAGE | Refills: 0 | Status: SHIPPED | OUTPATIENT
Start: 2018-06-04 | End: 2018-06-21

## 2018-06-04 NOTE — PROGRESS NOTES
"Subjective:      Patient ID: Eugenie Bennett is a 37 y.o. male.    Chief Complaint: F/U ADD, chronic sinus congestion, R shoulder pain and vasectomy referral    HPI  38 yo male here for f/u on above.  Stable on ADD meds, no problems/SE.  Reports getting treatment for sinus awhile back in .  Took Abx, still on flonase daily.  Still suffers with mostly nasal congestion with some PND at times.  No fever/chills.  No facial pain.    He has chronic R shoulder pain.  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  No trauma/injury.  Uses tylenol/topical PRN.  Wants to see what can be done.    Also, needs to see Uro for vasectomy consultation.    Past Medical History:   Diagnosis Date    GERD (gastroesophageal reflux disease)      Family History   Problem Relation Age of Onset    Colon cancer Mother          at 47    Cancer Maternal Grandmother         unsure of type    Alcohol abuse Maternal Grandfather         Cancer unknown type     Past Surgical History:   Procedure Laterality Date    ADENOIDECTOMY      TONSILLECTOMY      TYMPANOSTOMY TUBE PLACEMENT      x 2     Social History   Substance Use Topics    Smoking status: Never Smoker    Smokeless tobacco: Current User     Types: Chew      Comment: Smoker for maybe a year    Alcohol use No       /78 (BP Location: Left arm, Patient Position: Sitting, BP Method: Large (Manual))   Pulse 88   Temp 98.9 °F (37.2 °C) (Tympanic)   Ht 6' 2" (1.88 m)   Wt 94.5 kg (208 lb 5.4 oz)   BMI 26.75 kg/m²     Review of Systems   Constitutional: Positive for activity change. Negative for appetite change, chills, diaphoresis, fatigue, fever and unexpected weight change.   HENT: Positive for congestion and postnasal drip. Negative for hearing loss, sinus pain, sinus pressure and tinnitus.    Eyes: Negative for visual disturbance.   Respiratory: Negative for cough, chest tightness, shortness of breath and wheezing.  "   Cardiovascular: Negative for chest pain, palpitations and leg swelling.   Gastrointestinal: Negative for abdominal distention.   Genitourinary: Negative for difficulty urinating.   Musculoskeletal: Positive for arthralgias. Negative for back pain.   Neurological: Negative for dizziness, weakness and headaches.       Objective:     Physical Exam   Constitutional: He is oriented to person, place, and time. He appears well-developed and well-nourished. No distress.   HENT:   Right Ear: External ear normal.   Left Ear: External ear normal.   Nose: Nose normal.   Mouth/Throat: Oropharynx is clear and moist.   Nasal turbinates are swollen   Eyes: Conjunctivae are normal. Pupils are equal, round, and reactive to light.   Neck: Normal range of motion. Neck supple.   Cardiovascular: Normal rate, regular rhythm and normal heart sounds.    Pulmonary/Chest: Effort normal and breath sounds normal. No respiratory distress. He has no wheezes.   Abdominal: Soft. Bowel sounds are normal. He exhibits no distension. There is no tenderness. There is no guarding.   Musculoskeletal: He exhibits no edema.        Right shoulder: He exhibits tenderness. He exhibits normal range of motion.        Arms:  Neurological: He is alert and oriented to person, place, and time. No cranial nerve deficit.   Skin: Skin is warm and dry. No rash noted. He is not diaphoretic.   Psychiatric: He has a normal mood and affect. His behavior is normal. Judgment and thought content normal.   Nursing note and vitals reviewed.      Lab Results   Component Value Date    WBC 7.21 01/26/2018    HGB 14.5 01/26/2018    HCT 43.2 01/26/2018     01/26/2018    CHOL 188 01/26/2018    TRIG 69 01/26/2018    HDL 45 01/26/2018    ALT 17 01/26/2018    AST 24 01/26/2018     01/26/2018    K 3.8 01/26/2018     01/26/2018    CREATININE 1.1 01/26/2018    BUN 9 01/26/2018    CO2 23 01/26/2018    TSH 1.674 06/30/2017       Assessment:     1. Attention deficit  hyperactivity disorder (ADHD), combined type    2. Recurrent sinusitis    3. Chronic right shoulder pain    4. Encounter for vasectomy assessment         Plan:     Attention deficit hyperactivity disorder (ADHD), combined type    Recurrent sinusitis    Chronic right shoulder pain  -     X-ray Shoulder 2 or More Views Right; Future; Expected date: 06/04/2018    Encounter for vasectomy assessment  -     Cancel: Ambulatory referral to Urology  -     Ambulatory referral to Urology    Other orders  -     methylPREDNISolone (MEDROL DOSEPACK) 4 mg tablet; Take as directed  Dispense: 1 Package; Refill: 0    ADD is stable, cont current regimen.  Start medrol dose mónica, cont with flonase and do daily claritin or zyrtec.  If no relief, will send to ENT.  Xray on shoulder is normal.  Recommend PT at this time, pt can let me know where he wants to do this.  Referral for outside Urology as appt within system is 2+ months out.  F/u 6 mos

## 2018-06-21 ENCOUNTER — OFFICE VISIT (OUTPATIENT)
Dept: URGENT CARE | Facility: CLINIC | Age: 38
End: 2018-06-21
Payer: COMMERCIAL

## 2018-06-21 VITALS
WEIGHT: 205 LBS | RESPIRATION RATE: 18 BRPM | BODY MASS INDEX: 25.49 KG/M2 | HEART RATE: 82 BPM | TEMPERATURE: 97 F | SYSTOLIC BLOOD PRESSURE: 110 MMHG | OXYGEN SATURATION: 98 % | HEIGHT: 75 IN | DIASTOLIC BLOOD PRESSURE: 70 MMHG

## 2018-06-21 DIAGNOSIS — J32.9 CHRONIC SINUSITIS, UNSPECIFIED LOCATION: Primary | ICD-10-CM

## 2018-06-21 PROCEDURE — 3008F BODY MASS INDEX DOCD: CPT | Mod: CPTII,S$GLB,, | Performed by: FAMILY MEDICINE

## 2018-06-21 PROCEDURE — 99999 PR PBB SHADOW E&M-EST. PATIENT-LVL III: CPT | Mod: PBBFAC,,, | Performed by: FAMILY MEDICINE

## 2018-06-21 PROCEDURE — 99214 OFFICE O/P EST MOD 30 MIN: CPT | Mod: S$GLB,,, | Performed by: FAMILY MEDICINE

## 2018-06-21 RX ORDER — MONTELUKAST SODIUM 10 MG/1
10 TABLET ORAL NIGHTLY
Qty: 30 TABLET | Refills: 0 | Status: SHIPPED | OUTPATIENT
Start: 2018-06-21 | End: 2018-07-21

## 2018-06-21 RX ORDER — PREDNISONE 20 MG/1
40 TABLET ORAL DAILY
Qty: 10 TABLET | Refills: 0 | Status: SHIPPED | OUTPATIENT
Start: 2018-06-21 | End: 2018-06-27

## 2018-06-21 NOTE — PROGRESS NOTES
"Subjective:       Patient ID: Eugenie Bennett is a 37 y.o. male.    Chief Complaint: Nasal Congestion (chronic since november)    /70   Pulse 82   Temp 97.4 °F (36.3 °C) (Tympanic)   Resp 18   Ht 6' 3" (1.905 m)   Wt 93 kg (205 lb)   SpO2 98%   BMI 25.62 kg/m²     HPI  Patient presents with sinus drainage nasal congestion since November, for 7-8 months. Seen by PCP a few times during this period, last was 2 weeks ago, just finished steroid pack and antibiotics, got better, now bad again. No smoking. Hx facial injury including broken nose    Review of Systems   Constitutional: Negative.  Negative for chills and fever.   HENT: Positive for congestion and postnasal drip. Negative for ear pain, sinus pressure and sore throat.    Respiratory: Positive for wheezing. Negative for cough and chest tightness.    Cardiovascular: Negative.    Gastrointestinal: Negative.    Musculoskeletal: Negative.    Neurological: Negative.        Objective:      Physical Exam   Constitutional: He is oriented to person, place, and time. He appears well-developed and well-nourished. No distress.   HENT:   Head: Normocephalic and atraumatic.   Right Ear: External ear normal.   Left Ear: External ear normal.   Nose: Nose normal.   Mouth/Throat: No oropharyngeal exudate.   TM: clear effusion bilaterally  Nasal mucosa: erythematous   Eyes: EOM are normal. Pupils are equal, round, and reactive to light. Right eye exhibits no discharge. Left eye exhibits no discharge.   Neck: Normal range of motion. Neck supple.   Cardiovascular: Normal rate, regular rhythm and normal heart sounds.    Pulmonary/Chest: Effort normal and breath sounds normal. No respiratory distress. He has no wheezes. He has no rales.   Musculoskeletal: Normal range of motion.   Lymphadenopathy:     He has no cervical adenopathy.   Neurological: He is alert and oriented to person, place, and time.   Skin: Skin is warm and dry. He is not diaphoretic.   Nursing note " and vitals reviewed.      Assessment:       1. Chronic sinusitis, unspecified location        Plan:     Eugenie was seen today for nasal congestion.    Diagnoses and all orders for this visit:    Chronic sinusitis, unspecified location  -     montelukast (SINGULAIR) 10 mg tablet; Take 1 tablet (10 mg total) by mouth every evening.  -     predniSONE (DELTASONE) 20 MG tablet; Take 2 tablets (40 mg total) by mouth once daily. for 5 days  -     Ambulatory referral to ENT      Instructions  Work excuse  Discussed with pt/family all information and results pertaining to this visit. Discussed diagnosis and plan of treatment.  All questions and concerns were addressed at this time. Pt/family expresses understanding of information and instructions.  Care and follow up instruction provided.

## 2018-06-21 NOTE — PATIENT INSTRUCTIONS
Understanding Sinus Problems    You dont often think about your sinuses until theres a problem. One day you realize you cant smell dinner cooking. Or you find you often have headaches or problems breathing through your nose.  Symptoms of sinus problems  Sinus problems can cause uncomfortable symptoms. Your nose may run constantly. You might have trouble sleeping at night. You may even lose your sense of smell. Other symptoms can include:  · Nasal congestion  · Fullness in ears  · Green, yellow, or bloody drainage from the nose  · Trouble tasting food  · Frequent headaches  · Facial pain  · Cough  When sinuses are blocked  If something blocks the passages in the nose or sinuses, mucus cant drain. Mucus-filled sinuses often become infected.  · Colds cause the lining of the nose and sinuses to swell and make extra mucus. A buildup of mucus can lead to a more serious infection.  · Allergies irritate turbinates and other tissues. This causes swelling, which can cause a blockage. Over time, this irritation can also lead to sacs of swollen tissue (polyps).  · Polyps may form in both the sinuses and nose. Polyps can grow large enough to clog nasal passages and block drainage.  · A crooked (deviated) septum may block nasal passages. This is often the result of an injury.  Date Last Reviewed: 11/1/2016  © 9176-3859 [employer]. [address] [place] [address], [place] [address]. All rights reserved. This information is not intended as a substitute for professional medical care. Always follow your healthcare professional's instructions.

## 2018-06-21 NOTE — LETTER
June 21, 2018      [facility]  [address] [place] [address]  Phone: [phone]  Fax: [phone]       Patient: Eugenie Bennett   YOB: 1980  Date of Visit: 06/21/2018    To Whom It May Concern:    Guillermina Bennett  was at [facility] on 06/21/2018. He may return to work/school on 6/25 with no restrictions. If you have any questions or concerns, or if I can be of further assistance, please do not hesitate to contact me.    Sincerely,    Magaly Kong MD

## 2018-06-26 ENCOUNTER — OFFICE VISIT (OUTPATIENT)
Dept: OTOLARYNGOLOGY | Facility: CLINIC | Age: 38
End: 2018-06-26
Payer: COMMERCIAL

## 2018-06-26 VITALS
HEART RATE: 99 BPM | SYSTOLIC BLOOD PRESSURE: 136 MMHG | WEIGHT: 215.63 LBS | BODY MASS INDEX: 26.95 KG/M2 | TEMPERATURE: 98 F | DIASTOLIC BLOOD PRESSURE: 91 MMHG

## 2018-06-26 DIAGNOSIS — R09.81 NASAL CONGESTION: ICD-10-CM

## 2018-06-26 DIAGNOSIS — J34.3 HYPERTROPHY, NASAL, TURBINATE: Primary | ICD-10-CM

## 2018-06-26 PROCEDURE — 3008F BODY MASS INDEX DOCD: CPT | Mod: CPTII,S$GLB,, | Performed by: PHYSICIAN ASSISTANT

## 2018-06-26 PROCEDURE — 99999 PR PBB SHADOW E&M-EST. PATIENT-LVL III: CPT | Mod: PBBFAC,,, | Performed by: PHYSICIAN ASSISTANT

## 2018-06-26 PROCEDURE — 99203 OFFICE O/P NEW LOW 30 MIN: CPT | Mod: S$GLB,,, | Performed by: PHYSICIAN ASSISTANT

## 2018-06-26 NOTE — LETTER
June 26, 2018      Magaly Kong MD  [address] [place] [address]           [facility]NT  [address]  [place] [address]  Phone: [phone]  Fax: [phone]          Patient: Eugenie Bennett   MR Number: [id number]   YOB: 1980   Date of Visit: 6/26/2018       Dear Dr. Magaly Kong:    Thank you for referring Eugenie Bennett to me for evaluation. Attached you will find relevant portions of my assessment and plan of care.    If you have questions, please do not hesitate to call me. I look forward to following Eugenie Bennett along with you.    Sincerely,    Nanda Thompson PA-C    Enclosure  CC:  No Recipients    If you would like to receive this communication electronically, please contact [email or web address] or [phone] to request more information on [employer] Link access.    For providers and/or their staff who would like to refer a patient to [facility], please contact us through our one-stop-shop provider referral line, [facility] , at [phone].    If you feel you have received this communication in error or would no longer like to receive these types of communications, please e-mail [email or web address]

## 2018-06-26 NOTE — LETTER
June 26, 2018      [facility]  [address] [place] [address]  Phone: [phone]  Fax: [phone]       Patient: Eugenie Bennett   YOB: 1980  Date of Visit: 06/26/2018    To Whom It May Concern:    Guillermina Bennett  was at [facility] on 06/26/2018. He may return to work/school on 6/28/18 with no restrictions. If you have any questions or concerns, or if I can be of further assistance, please do not hesitate to contact me.    Sincerely,      DEEPAK Simms

## 2018-06-26 NOTE — PROGRESS NOTES
Subjective:       Patient ID: Eugenie Bennett is a 37 y.o. male.    Chief Complaint: Other (Pt states that he cant breath out of his nose and is having sob since November 2017)    Mr. Bennett is a very pleasant 36 yo male here to see me today for chronic nasal congestion. He states that his symptoms started in November and have become worse. He is currently on flonase, singulair and xyzal regularly with little relief. He is having no other symptoms. He states that he is unable to work due to not being able to breathe out of his nose. He does have occasional nose bleeds and did have a fracture of his nose during childhood.       Review of Systems   Constitutional: Negative for fatigue, fever and unexpected weight change.   HENT: Negative for congestion (nasal), ear discharge, ear pain, facial swelling, hearing loss, nosebleeds, postnasal drip, rhinorrhea, sinus pressure, sneezing, sore throat, tinnitus, trouble swallowing and voice change.    Eyes: Negative for discharge, redness and itching.   Respiratory: Negative for cough, choking, shortness of breath and wheezing.    Cardiovascular: Negative for chest pain and palpitations.   Gastrointestinal: Negative for abdominal pain.        No reflux.   Musculoskeletal: Negative for neck pain.   Neurological: Negative for dizziness, facial asymmetry, light-headedness and headaches.   Hematological: Negative for adenopathy. Does not bruise/bleed easily.   Psychiatric/Behavioral: Negative for agitation, behavioral problems, confusion and decreased concentration.       Objective:      Physical Exam   Constitutional: He is oriented to person, place, and time. Vital signs are normal. He appears well-developed and well-nourished. No distress.   HENT:   Head: Normocephalic and atraumatic.   Right Ear: Hearing, tympanic membrane, external ear and ear canal normal.   Left Ear: Hearing, tympanic membrane, external ear and ear canal normal.   Nose: Nasal deformity present. No  mucosal edema, rhinorrhea or septal deviation.   Mouth/Throat: Uvula is midline, oropharynx is clear and moist and mucous membranes are normal. No trismus in the jaw. Normal dentition. No uvula swelling. No oropharyngeal exudate or posterior oropharyngeal edema.   Enlarged, boggy turbinates   Eyes: Conjunctivae and EOM are normal. Pupils are equal, round, and reactive to light. Right eye exhibits no chemosis. Left eye exhibits no chemosis. Right conjunctiva is not injected. Left conjunctiva is not injected. No scleral icterus.   Neck: Trachea normal and phonation normal. No tracheal tenderness present. No tracheal deviation present. No thyroid mass and no thyromegaly present.   Cardiovascular: Intact distal pulses.    Pulmonary/Chest: Effort normal. No accessory muscle usage or stridor. No respiratory distress.   Lymphadenopathy:        Head (right side): No submental, no submandibular, no preauricular and no posterior auricular adenopathy present.        Head (left side): No submental, no submandibular, no preauricular and no posterior auricular adenopathy present.     He has no cervical adenopathy.        Right cervical: No superficial cervical and no deep cervical adenopathy present.       Left cervical: No superficial cervical and no deep cervical adenopathy present.   Neurological: He is alert and oriented to person, place, and time. No cranial nerve deficit.   Skin: Skin is warm and dry. No rash noted. No erythema.   Psychiatric: He has a normal mood and affect. His behavior is normal. Thought content normal.       Assessment:       1. Hypertrophy, nasal, turbinate    2. Nasal congestion        Plan:       Nasal congestion: he is on maximal therapy. I am going to send him to see Dr. Garcia to discuss turbinate reduction.  He is very anxious.

## 2018-06-27 ENCOUNTER — OFFICE VISIT (OUTPATIENT)
Dept: OTOLARYNGOLOGY | Facility: CLINIC | Age: 38
End: 2018-06-27
Payer: COMMERCIAL

## 2018-06-27 VITALS
SYSTOLIC BLOOD PRESSURE: 128 MMHG | HEART RATE: 103 BPM | BODY MASS INDEX: 27.56 KG/M2 | TEMPERATURE: 98 F | WEIGHT: 220.44 LBS | DIASTOLIC BLOOD PRESSURE: 85 MMHG

## 2018-06-27 DIAGNOSIS — M95.0 NASAL VALVE COLLAPSE: ICD-10-CM

## 2018-06-27 DIAGNOSIS — J34.2 DEVIATED NASAL SEPTUM: Primary | ICD-10-CM

## 2018-06-27 DIAGNOSIS — J34.3 NASAL TURBINATE HYPERTROPHY: ICD-10-CM

## 2018-06-27 PROCEDURE — 99999 PR PBB SHADOW E&M-EST. PATIENT-LVL IV: CPT | Mod: PBBFAC,,, | Performed by: OTOLARYNGOLOGY

## 2018-06-27 PROCEDURE — 99214 OFFICE O/P EST MOD 30 MIN: CPT | Mod: S$GLB,,, | Performed by: OTOLARYNGOLOGY

## 2018-06-27 PROCEDURE — 3008F BODY MASS INDEX DOCD: CPT | Mod: CPTII,S$GLB,, | Performed by: OTOLARYNGOLOGY

## 2018-06-27 NOTE — PROGRESS NOTES
Subjective:       Patient ID: Eugenie Bennett is a 37 y.o. male.    Chief Complaint: Other (Pt states that he cant breath out of his nose and is having sob since November 2017)    Mr. Bennett is a very pleasant 38 yo male here to see me today for chronic nasal congestion. He states that his symptoms started in November and have become worse. He is currently on flonase, singulair and xyzal regularly with little relief. He is having no other symptoms. He states that he is unable to work due to not being able to breathe out of his nose. He does have occasional nose bleeds and did have a fracture of his nose during childhood.       Review of Systems   Constitutional: Negative for fatigue, fever and unexpected weight change.   HENT: Negative for congestion (nasal), ear discharge, ear pain, facial swelling, hearing loss, nosebleeds, postnasal drip, rhinorrhea, sinus pressure, sneezing, sore throat, tinnitus, trouble swallowing and voice change.    Eyes: Negative for discharge, redness and itching.   Respiratory: Negative for cough, choking, shortness of breath and wheezing.    Cardiovascular: Negative for chest pain and palpitations.   Gastrointestinal: Negative for abdominal pain.        No reflux.   Musculoskeletal: Negative for neck pain.   Neurological: Negative for dizziness, facial asymmetry, light-headedness and headaches.   Hematological: Negative for adenopathy. Does not bruise/bleed easily.   Psychiatric/Behavioral: Negative for agitation, behavioral problems, confusion and decreased concentration.       Objective:      Physical Exam   Constitutional: He is oriented to person, place, and time. Vital signs are normal. He appears well-developed and well-nourished. No distress.   HENT:   Head: Normocephalic and atraumatic.   Right Ear: Hearing, tympanic membrane, external ear and ear canal normal.   Left Ear: Hearing, tympanic membrane, external ear and ear canal normal.   Nose: Nasal deformity present with  dorsal deviation to the right. No mucosal edema, rhinorrhea.  Septum deviated to the left.  + Jacob maneuver.  Alar collapse on inspiration.  Mild turbinate hypertrophy.                 Mouth/Throat: Uvula is midline, oropharynx is clear and moist and mucous membranes are normal. No trismus in the jaw. Normal dentition. No uvula swelling. No oropharyngeal exudate or posterior oropharyngeal edema.   Eyes: Conjunctivae and EOM are normal. Pupils are equal, round, and reactive to light. Right eye exhibits no chemosis. Left eye exhibits no chemosis. Right conjunctiva is not injected. Left conjunctiva is not injected. No scleral icterus.   Neck: Trachea normal and phonation normal. No tracheal tenderness present. No tracheal deviation present. No thyroid mass and no thyromegaly present.   Cardiovascular: Intact distal pulses.    Pulmonary/Chest: Effort normal. No accessory muscle usage or stridor. No respiratory distress.   Lymphadenopathy:        Head (right side): No submental, no submandibular, no preauricular and no posterior auricular adenopathy present.        Head (left side): No submental, no submandibular, no preauricular and no posterior auricular adenopathy present.     He has no cervical adenopathy.        Right cervical: No superficial cervical and no deep cervical adenopathy present.       Left cervical: No superficial cervical and no deep cervical adenopathy present.   Neurological: He is alert and oriented to person, place, and time. No cranial nerve deficit.   Skin: Skin is warm and dry. No rash noted. No erythema.   Psychiatric: He has a normal mood and affect. His behavior is normal. Thought content normal.       Assessment:       1. Hypertrophy, nasal, turbinate    2. Deviated septum    3.       Nasal valve collapse    Plan:       Nasal obstruction, multifactorial    Recommend septoplasty, turbinate reduction, repair of nasal valve stenosis.  We reviewed in detail the plan for surgery as well as the  risks, benefits and alternatives. We discussed that the risks included, but were not limited to, cosmetic deformity, persistent nasal obstruction, epistaxis, infection, septal perforation and delayed wound healing.  Eugenie asked several questions which I answered to the patient's satisfaction.  They would like to proceed with surgery as planned.

## 2018-06-27 NOTE — PATIENT INSTRUCTIONS
Understanding Nasal Anatomy: Inside View  There is a lot happening under the surface of the nose. The bone and cartilage under the skin give the nose most of its size and shape. Other structures inside and behind the nose help you breathe. Learning the anatomy of the nose can help you further understand how the nose works.                   Understanding Nasal Obstruction (Septal Deviation and/or Turbinate Hypertrophy    Nasal obstruction may due to a variety of reasons.  The most common is a combination of factors.  Patient have a deviated nasal septum.  The dividing wall between the right and left nasal cavities is no longer straight.  The may be from trauma (even minor trauma) or simply a product of how you grew.  Also, the nasal turbinates may be enlarged.  This can be because the bone beneath is large or angled incorrectly or because the soft tissue around the turbinate is swollen (frequently from allergies).          Understanding Nasal Allergies  Nasal allergies (also called allergic rhinitis) are a common health problem. They may be seasonal.This means they cause symptoms only at certain times of the year. Or they may be perennial.This means they cause symptoms all year long. Other health problems, such as asthma, often occur along with allergies as well.    What Is an allergic reaction?  An allergy is a reaction to a substance called an allergen. Common allergens include:  · Wind-borne pollen  · Mold  · Dust mites  · Furry and feathered animals  · Cockroaches  Normally, allergens are harmless. But when a person has allergies, their body thinks they are harmful. Their body then attacks allergens with antibodies. Antibodies are attached to special cells called mast cells. Allergens stick to the antibodies. This makes the mast cells release histamine and other chemicals. This is an allergic reaction. The chemicals irritate nearby nasal tissue. This causes nasal allergy symptoms.  Common nasal allergy  symptoms  Allergies can cause nasal tissue to swell. This makes the air passages smaller. The nose may feel stuffed up. The nose may also make extra mucus, which can plug the nasal passages or drip out of the nose. Mucus can drip down the back of the throat (postnasal drip) as well. Sinus tissue can swell. This may cause pain and headache. Common allergy symptoms include:  · Runny nose with clear, watery discharge  · Stuffy nose (nasal congestion)  · Drainage down your throat (postnasal drip)  · Sneezing  · Red, watery eyes  · Itchy nose, eyes, ears, and throat  · Plugged-up ears (ear congestion)  · Sore throat  · Coughing  · Sinus pain and swelling  · Headache  It may not be allergies  Other health problems can cause symptoms like those of nasal allergies. These include:  · Nonallergic rhinitis and viruses such as colds  · Irritants and pollutants, such as strong odors or smoke  · Certain medications  · Changes in the weather         Understanding Sinusitis      What is a sinus infection?  A sinus infection, or sinusitis (ypvj-gg-EY-tis), is a swelling of the lining in the sinuses. Acute sinusitis lasts for less than four weeks. Chronic sinusitis lasts for more than 12 weeks.    What causes sinus infections?  The most common cause is a virus, such as the common cold. When you catch a cold, your mucus becomes thick and sticky, and doesn't drain well. Bacteria can grow in the mucus trapped in your sinuses. This can lead to a bacterial sinus infection.  For patient with symptoms less than a week, 80% of the infections are due to viruses and will resolve without antibiotics.  Infections lasting longer than 1 week are more likely to be due to bacteria, and antibiotics (sometimes with steroids) may be needed to stop the infection.  Patients who have symptoms lasting several weeks may need more aggressive medical therapy and/or procedures to restore healthy sinuses.    Who gets them?  Anyone can get a sinus infection, but  people with nasal allergies, hay fever, or asthma have an increased risk. Other risk factors include exposure to cigarette smoke, nasal polyps (CLINT-ips), and changes in pressure (such as during flying or scuba diving). Sinus infections can also be caused by a deviated septum, which is when the part of your nose that separates the nostrils is out of place.    What are the symptoms?  Headache  Pain or pressure in the forehead, cheeks, nose, or between the eyes  Fever  Nasal congestion and runny nose  Cough that may be worse at night  Sore throat  Decreased sense of smell and taste  Tiredness  Bad breath  How are they treated?  Only about two out of 100 people with cold symptoms will get a bacterial sinus infection. Antibiotics can treat bacterial infections, but not viral infections. Most people do not need antibiotics. Having a green or yellow nasal discharge does not necessarily mean that you need antibiotics.    If you have had symptoms for less than one week, try the following:  Drink plenty of fluids to keep your mucus thin  Sleep with your head propped up, or with the pain-free side of your face on the pillow  Inhale steam three or four times a day (for example, sit in the bathroom with a hot shower running)  Use a salt-water nasal spray or a nasal cup to loosen mucus  Use over-the-counter pain medicine to help with pain and headaches  Put a warm, wet towel against your face to help with pain  Take an over-the-counter decongestant to help your sinuses drain, but avoid antihistamines, which make mucus thick.      Chronic sinusitis is a common condition in which the cavities around nasal passages (sinuses) become inflamed and swollen -- for at least eight weeks, despite treatment attempts.    Also known as chronic rhinosinusitis, this condition interferes with drainage and causes mucus to build up. If you have chronic sinusitis, it may be difficult to breathe through your nose. The area around your eyes and face  may feel swollen, and you may have throbbing facial pain or a headache.    Chronic sinusitis may be caused by an infection, but it can also be caused by growths in the sinuses (nasal polyps) or by a deviated nasal septum. Chronic sinusitis most commonly affects young and middle-aged adults, but it also can affect children.  Most patients with chronic sinusitis with need to have the nose inspected with a camera to see if there is any obvious blockage of the sinus opening or pus draining from the sinuses.   However; this usually does now allow your doctor to look INTO the sinuses.  The best method for evaluating this area is a CT scan.  The combination of the two is the best method for evaluating chronic sinusitis from chronic severe allergies.      What about Smoking?  The respiratory tract is lined with special cells that move mucus out of the sinus cavities by moving in a sweeping action.      When the cells are exposed to smoke, the cilia become paralyzed.   This results in a chronic buildup of mucus in the sinus cavities that results in increased congestion and inflammation over time.  Because of this, treatment of sinus disease without smoking cessation is very rarely effective.

## 2018-06-28 RX ORDER — DEXTROAMPHETAMINE SACCHARATE, AMPHETAMINE ASPARTATE, DEXTROAMPHETAMINE SULFATE AND AMPHETAMINE SULFATE 5; 5; 5; 5 MG/1; MG/1; MG/1; MG/1
60 TABLET ORAL DAILY
Qty: 90 TABLET | Refills: 0 | Status: SHIPPED | OUTPATIENT
Start: 2018-06-28 | End: 2018-07-30 | Stop reason: SDUPTHER

## 2018-07-16 ENCOUNTER — TELEPHONE (OUTPATIENT)
Dept: OTOLARYNGOLOGY | Facility: CLINIC | Age: 38
End: 2018-07-16

## 2018-07-16 ENCOUNTER — PATIENT MESSAGE (OUTPATIENT)
Dept: OTOLARYNGOLOGY | Facility: CLINIC | Age: 38
End: 2018-07-16

## 2018-07-16 NOTE — TELEPHONE ENCOUNTER
Spoke with wife regarding billing inquiries that the pre service department would be in contact with them upon approval through insurance with any amount or account information financially. Advised wife that I would send the CPT codes to the patient in a message on the portal if they wanted to call the insurance for an estimated coverage amount. Nanda was very understanding and thanked me for the help.

## 2018-07-16 NOTE — TELEPHONE ENCOUNTER
----- Message from Raul Mckeon sent at 7/16/2018  9:07 AM CDT -----  Contact: Libby  [phone]  Patient wants to know if there would be a co-pay for this procedure and if [facility] has provided that information on the authorization form please contact Kim at [phone].

## 2018-07-25 ENCOUNTER — TELEPHONE (OUTPATIENT)
Dept: OTOLARYNGOLOGY | Facility: CLINIC | Age: 38
End: 2018-07-25

## 2018-07-25 NOTE — TELEPHONE ENCOUNTER
Attempted call to patient, asked for a return call regarding surgery date for Wednesday 8/8/18. Dr. Garcia's OR day has changed to Thursday's only and we are requesting to change date to 8/9/18. Detailed provided in voicemail. Asked for a return call back at his earliest convenience.

## 2018-07-30 RX ORDER — DEXTROAMPHETAMINE SACCHARATE, AMPHETAMINE ASPARTATE, DEXTROAMPHETAMINE SULFATE AND AMPHETAMINE SULFATE 5; 5; 5; 5 MG/1; MG/1; MG/1; MG/1
60 TABLET ORAL DAILY
Qty: 90 TABLET | Refills: 0 | Status: SHIPPED | OUTPATIENT
Start: 2018-07-30 | End: 2018-08-28 | Stop reason: SDUPTHER

## 2018-08-06 NOTE — PRE-PROCEDURE INSTRUCTIONS
Pre op instructions reviewed with patient per phone:    To confirm, Your surgeon has instructed you:  Surgery is scheduled 8/8/18 at ENT.      Please report to [facility] 1st floor main lobby by MD.   Pre admit office to call afternoon prior to surgery with final arrival time      INSTRUCTIONS IMPORTANT!!!  ¨ Do not eat, drink, or smoke after 12 midnight-including water. OK to brush teeth, no gum, candy or mints!    ¨ Take only these medicines with a small swallow of water-morning of surgery.  None  ____  Do not wear makeup, including mascara.  ____  No powder, lotions or creams to surgical area.  ____  Please remove all jewelry, including piercings and leave at home.  ____  No money or valuables needed. Please leave at home.  ____  Please bring identification and insurance information to hospital.  ____  If going home the same day, arrange for a ride home. You will not be able to   drive if Anesthesia was used.  ____  Children, under 12 years old, must remain in the waiting room with an adult.  They are not allowed in patient areas.  ____  Wear loose fitting clothing. Allow for dressings, bandages.  ____  Stop Aspirin, Ibuprofen, Motrin and Aleve at least 5-7 days before surgery, unless otherwise instructed by your doctor, or the nurse.   You MAY use Tylenol/acetaminophen until day of surgery.  ____  If you take diabetic medication, do not take am of surgery unless instructed by   Doctor.  ____ Stop taking any Fish Oil supplement or any Vitamins that contain Vitamin E at least 5 days prior to surgery.          Bathing Instructions-- The night before surgery and the morning prior to coming to the hospital:   -Do not shave the surgical area.   -Shower and wash your hair and body as usual with anti-bacterial  soap and shampoo.   -Rinse your hair and body completely.   -Use one packet of hibiclens to wash the surgical site (using your hand) gently for 5 minutes.  Do not scrub you skin too  hard.   -Do not use hibiclens on your head, face, or genitals.   -Do not wash with anti-bacterial soap after you use the hibiclens.   -Rinse your body thoroughly.   -Dry with clean, soft towel.  Do not use lotion, cream, deodorant, or powders on   the surgical site.    Use antibacterial soap in place of hibiclens if your surgery is on the head, face or genitals.         Surgical Site Infection    Prevention of surgical site infections:     -Keep incisions clean and dry.   -Do not soak/submerge incisions in water until completely healed.   -Do not apply lotions, powders, creams, or deodorants to site.   -Always make sure hands are cleaned with antibacterial soap/ alcohol-based   prior to touching the surgical site.  (This includes doctors, nurses, staff, and yourself.)    Signs and symptoms:   -Redness and pain around the area where you had surgery   -Drainage of cloudy fluid from your surgical wound   -Fever over 100.4  I have read or had read and explained to me, and understand the above information.

## 2018-08-07 ENCOUNTER — ANESTHESIA EVENT (OUTPATIENT)
Dept: SURGERY | Facility: HOSPITAL | Age: 38
End: 2018-08-07
Payer: COMMERCIAL

## 2018-08-07 ENCOUNTER — TELEPHONE (OUTPATIENT)
Dept: OTOLARYNGOLOGY | Facility: CLINIC | Age: 38
End: 2018-08-07

## 2018-08-07 NOTE — TELEPHONE ENCOUNTER
Surgery arrival time of 5:30 am provided to patient with verbal understanding. Reminder of NPO at midnight. Instructions and directions verbally understood.

## 2018-08-08 ENCOUNTER — ANESTHESIA (OUTPATIENT)
Dept: SURGERY | Facility: HOSPITAL | Age: 38
End: 2018-08-08
Payer: COMMERCIAL

## 2018-08-08 ENCOUNTER — HOSPITAL ENCOUNTER (OUTPATIENT)
Facility: HOSPITAL | Age: 38
Discharge: HOME | End: 2018-08-08
Attending: OTOLARYNGOLOGY | Admitting: OTOLARYNGOLOGY
Payer: COMMERCIAL

## 2018-08-08 ENCOUNTER — PATIENT MESSAGE (OUTPATIENT)
Dept: SURGERY | Facility: HOSPITAL | Age: 38
End: 2018-08-08

## 2018-08-08 VITALS
OXYGEN SATURATION: 97 % | WEIGHT: 200.63 LBS | RESPIRATION RATE: 16 BRPM | SYSTOLIC BLOOD PRESSURE: 125 MMHG | HEIGHT: 75 IN | HEART RATE: 85 BPM | DIASTOLIC BLOOD PRESSURE: 75 MMHG | TEMPERATURE: 98 F | BODY MASS INDEX: 24.94 KG/M2

## 2018-08-08 DIAGNOSIS — J34.3 NASAL TURBINATE HYPERTROPHY: ICD-10-CM

## 2018-08-08 DIAGNOSIS — M95.0 NASAL VALVE COLLAPSE: Primary | ICD-10-CM

## 2018-08-08 DIAGNOSIS — J34.2 DEVIATED NASAL SEPTUM: ICD-10-CM

## 2018-08-08 PROCEDURE — 30465 REPAIR NASAL STENOSIS: CPT | Mod: ,,, | Performed by: OTOLARYNGOLOGY

## 2018-08-08 PROCEDURE — 71000015 HC POSTOP RECOV 1ST HR: Performed by: OTOLARYNGOLOGY

## 2018-08-08 PROCEDURE — 63600175 PHARM REV CODE 636 W HCPCS: Performed by: NURSE ANESTHETIST, CERTIFIED REGISTERED

## 2018-08-08 PROCEDURE — 27800903 OPTIME MED/SURG SUP & DEVICES OTHER IMPLANTS: Performed by: OTOLARYNGOLOGY

## 2018-08-08 PROCEDURE — 37000008 HC ANESTHESIA 1ST 15 MINUTES: Performed by: OTOLARYNGOLOGY

## 2018-08-08 PROCEDURE — 63600175 PHARM REV CODE 636 W HCPCS: Performed by: ANESTHESIOLOGY

## 2018-08-08 PROCEDURE — 25000003 PHARM REV CODE 250: Performed by: NURSE ANESTHETIST, CERTIFIED REGISTERED

## 2018-08-08 PROCEDURE — 30140 RESECT INFERIOR TURBINATE: CPT | Mod: 50,51,, | Performed by: OTOLARYNGOLOGY

## 2018-08-08 PROCEDURE — 36000706: Performed by: OTOLARYNGOLOGY

## 2018-08-08 PROCEDURE — 63600175 PHARM REV CODE 636 W HCPCS: Performed by: OTOLARYNGOLOGY

## 2018-08-08 PROCEDURE — 30520 REPAIR OF NASAL SEPTUM: CPT | Mod: 51,,, | Performed by: OTOLARYNGOLOGY

## 2018-08-08 PROCEDURE — 37000009 HC ANESTHESIA EA ADD 15 MINS: Performed by: OTOLARYNGOLOGY

## 2018-08-08 PROCEDURE — 36000707: Performed by: OTOLARYNGOLOGY

## 2018-08-08 PROCEDURE — 71000033 HC RECOVERY, INTIAL HOUR: Performed by: OTOLARYNGOLOGY

## 2018-08-08 PROCEDURE — 25000003 PHARM REV CODE 250: Performed by: OTOLARYNGOLOGY

## 2018-08-08 DEVICE — IMPLANTABLE DEVICE: Type: IMPLANTABLE DEVICE | Site: NOSE | Status: FUNCTIONAL

## 2018-08-08 RX ORDER — ONDANSETRON 4 MG/1
8 TABLET, ORALLY DISINTEGRATING ORAL EVERY 8 HOURS PRN
Qty: 10 TABLET | Refills: 0 | Status: SHIPPED | OUTPATIENT
Start: 2018-08-08 | End: 2018-10-19

## 2018-08-08 RX ORDER — OXYCODONE AND ACETAMINOPHEN 5; 325 MG/1; MG/1
1 TABLET ORAL EVERY 4 HOURS PRN
Qty: 30 TABLET | Refills: 0 | Status: SHIPPED | OUTPATIENT
Start: 2018-08-08 | End: 2018-10-19

## 2018-08-08 RX ORDER — MIDAZOLAM HYDROCHLORIDE 1 MG/ML
INJECTION, SOLUTION INTRAMUSCULAR; INTRAVENOUS
Status: DISCONTINUED | OUTPATIENT
Start: 2018-08-08 | End: 2018-08-08

## 2018-08-08 RX ORDER — FENTANYL CITRATE 50 UG/ML
INJECTION, SOLUTION INTRAMUSCULAR; INTRAVENOUS
Status: DISCONTINUED | OUTPATIENT
Start: 2018-08-08 | End: 2018-08-08

## 2018-08-08 RX ORDER — OXYMETAZOLINE HCL 0.05 %
SPRAY, NON-AEROSOL (ML) NASAL
Status: DISCONTINUED | OUTPATIENT
Start: 2018-08-08 | End: 2018-08-08 | Stop reason: HOSPADM

## 2018-08-08 RX ORDER — MEPERIDINE HYDROCHLORIDE 50 MG/ML
12.5 INJECTION INTRAMUSCULAR; INTRAVENOUS; SUBCUTANEOUS ONCE AS NEEDED
Status: ACTIVE | OUTPATIENT
Start: 2018-08-08 | End: 2018-08-08

## 2018-08-08 RX ORDER — SODIUM CHLORIDE 0.9 % (FLUSH) 0.9 %
3 SYRINGE (ML) INJECTION
Status: DISCONTINUED | OUTPATIENT
Start: 2018-08-08 | End: 2018-08-13 | Stop reason: HOSPADM

## 2018-08-08 RX ORDER — MUPIROCIN 20 MG/G
OINTMENT TOPICAL
Status: DISCONTINUED | OUTPATIENT
Start: 2018-08-08 | End: 2018-08-08 | Stop reason: HOSPADM

## 2018-08-08 RX ORDER — DEXAMETHASONE SODIUM PHOSPHATE 4 MG/ML
INJECTION, SOLUTION INTRA-ARTICULAR; INTRALESIONAL; INTRAMUSCULAR; INTRAVENOUS; SOFT TISSUE
Status: DISCONTINUED | OUTPATIENT
Start: 2018-08-08 | End: 2018-08-08

## 2018-08-08 RX ORDER — FENTANYL CITRATE 50 UG/ML
25 INJECTION, SOLUTION INTRAMUSCULAR; INTRAVENOUS EVERY 5 MIN PRN
Status: DISCONTINUED | OUTPATIENT
Start: 2018-08-08 | End: 2018-08-13 | Stop reason: HOSPADM

## 2018-08-08 RX ORDER — AMOXICILLIN AND CLAVULANATE POTASSIUM 875; 125 MG/1; MG/1
1 TABLET, FILM COATED ORAL 2 TIMES DAILY
Qty: 28 TABLET | Refills: 0 | Status: SHIPPED | OUTPATIENT
Start: 2018-08-08 | End: 2018-08-22

## 2018-08-08 RX ORDER — PROPOFOL 10 MG/ML
VIAL (ML) INTRAVENOUS
Status: DISCONTINUED | OUTPATIENT
Start: 2018-08-08 | End: 2018-08-08

## 2018-08-08 RX ORDER — CEFAZOLIN SODIUM 2 G/50ML
2 SOLUTION INTRAVENOUS ONCE
Status: COMPLETED | OUTPATIENT
Start: 2018-08-08 | End: 2018-08-08

## 2018-08-08 RX ORDER — ACETAMINOPHEN 10 MG/ML
1000 INJECTION, SOLUTION INTRAVENOUS ONCE
Status: COMPLETED | OUTPATIENT
Start: 2018-08-08 | End: 2018-08-08

## 2018-08-08 RX ORDER — KETOROLAC TROMETHAMINE 30 MG/ML
INJECTION, SOLUTION INTRAMUSCULAR; INTRAVENOUS
Status: DISCONTINUED | OUTPATIENT
Start: 2018-08-08 | End: 2018-08-08

## 2018-08-08 RX ORDER — LIDOCAINE HYDROCHLORIDE 10 MG/ML
INJECTION INFILTRATION; PERINEURAL
Status: DISCONTINUED | OUTPATIENT
Start: 2018-08-08 | End: 2018-08-08

## 2018-08-08 RX ORDER — SODIUM CHLORIDE 0.9 % (FLUSH) 0.9 %
3 SYRINGE (ML) INJECTION EVERY 8 HOURS
Status: DISCONTINUED | OUTPATIENT
Start: 2018-08-08 | End: 2018-08-13 | Stop reason: HOSPADM

## 2018-08-08 RX ORDER — SODIUM CHLORIDE, SODIUM LACTATE, POTASSIUM CHLORIDE, CALCIUM CHLORIDE 600; 310; 30; 20 MG/100ML; MG/100ML; MG/100ML; MG/100ML
INJECTION, SOLUTION INTRAVENOUS CONTINUOUS PRN
Status: DISCONTINUED | OUTPATIENT
Start: 2018-08-08 | End: 2018-08-08

## 2018-08-08 RX ORDER — SUCCINYLCHOLINE CHLORIDE 20 MG/ML
INJECTION INTRAMUSCULAR; INTRAVENOUS
Status: DISCONTINUED | OUTPATIENT
Start: 2018-08-08 | End: 2018-08-08

## 2018-08-08 RX ORDER — GLYCOPYRROLATE 0.2 MG/ML
INJECTION INTRAMUSCULAR; INTRAVENOUS
Status: DISCONTINUED | OUTPATIENT
Start: 2018-08-08 | End: 2018-08-08

## 2018-08-08 RX ORDER — ROCURONIUM BROMIDE 10 MG/ML
INJECTION, SOLUTION INTRAVENOUS
Status: DISCONTINUED | OUTPATIENT
Start: 2018-08-08 | End: 2018-08-08

## 2018-08-08 RX ORDER — OXYCODONE HYDROCHLORIDE 5 MG/1
5 TABLET ORAL EVERY 4 HOURS PRN
Status: DISCONTINUED | OUTPATIENT
Start: 2018-08-08 | End: 2018-08-13 | Stop reason: HOSPADM

## 2018-08-08 RX ORDER — PROPOFOL 10 MG/ML
VIAL (ML) INTRAVENOUS CONTINUOUS PRN
Status: DISCONTINUED | OUTPATIENT
Start: 2018-08-08 | End: 2018-08-08

## 2018-08-08 RX ORDER — NEOSTIGMINE METHYLSULFATE 1 MG/ML
INJECTION, SOLUTION INTRAVENOUS
Status: DISCONTINUED | OUTPATIENT
Start: 2018-08-08 | End: 2018-08-08

## 2018-08-08 RX ORDER — ONDANSETRON 2 MG/ML
INJECTION INTRAMUSCULAR; INTRAVENOUS
Status: DISCONTINUED | OUTPATIENT
Start: 2018-08-08 | End: 2018-08-08

## 2018-08-08 RX ADMIN — FENTANYL CITRATE 50 MCG: 50 INJECTION, SOLUTION INTRAMUSCULAR; INTRAVENOUS at 08:08

## 2018-08-08 RX ADMIN — ACETAMINOPHEN 1000 MG: 10 INJECTION, SOLUTION INTRAVENOUS at 09:08

## 2018-08-08 RX ADMIN — ONDANSETRON 4 MG: 2 INJECTION, SOLUTION INTRAMUSCULAR; INTRAVENOUS at 07:08

## 2018-08-08 RX ADMIN — CEFAZOLIN SODIUM 2 G: 2 SOLUTION INTRAVENOUS at 07:08

## 2018-08-08 RX ADMIN — ROCURONIUM BROMIDE 10 MG: 10 INJECTION, SOLUTION INTRAVENOUS at 07:08

## 2018-08-08 RX ADMIN — NEOSTIGMINE METHYLSULFATE 4 MG: 1 INJECTION INTRAVENOUS at 08:08

## 2018-08-08 RX ADMIN — SODIUM CHLORIDE, SODIUM LACTATE, POTASSIUM CHLORIDE, AND CALCIUM CHLORIDE: 600; 310; 30; 20 INJECTION, SOLUTION INTRAVENOUS at 06:08

## 2018-08-08 RX ADMIN — PROPOFOL 100 MCG/KG/MIN: 10 INJECTION, EMULSION INTRAVENOUS at 07:08

## 2018-08-08 RX ADMIN — ROCURONIUM BROMIDE 30 MG: 10 INJECTION, SOLUTION INTRAVENOUS at 07:08

## 2018-08-08 RX ADMIN — MIDAZOLAM 2 MG: 1 INJECTION INTRAMUSCULAR; INTRAVENOUS at 06:08

## 2018-08-08 RX ADMIN — KETOROLAC TROMETHAMINE 30 MG: 30 INJECTION, SOLUTION INTRAMUSCULAR; INTRAVENOUS at 08:08

## 2018-08-08 RX ADMIN — FENTANYL CITRATE 50 MCG: 50 INJECTION, SOLUTION INTRAMUSCULAR; INTRAVENOUS at 07:08

## 2018-08-08 RX ADMIN — DEXAMETHASONE SODIUM PHOSPHATE 4 MG: 4 INJECTION, SOLUTION INTRA-ARTICULAR; INTRALESIONAL; INTRAMUSCULAR; INTRAVENOUS; SOFT TISSUE at 07:08

## 2018-08-08 RX ADMIN — ROCURONIUM BROMIDE 5 MG: 10 INJECTION, SOLUTION INTRAVENOUS at 07:08

## 2018-08-08 RX ADMIN — LIDOCAINE HYDROCHLORIDE 50 MG: 10 INJECTION, SOLUTION INFILTRATION; PERINEURAL at 07:08

## 2018-08-08 RX ADMIN — SUCCINYLCHOLINE CHLORIDE 120 MG: 20 INJECTION, SOLUTION INTRAMUSCULAR; INTRAVENOUS at 07:08

## 2018-08-08 RX ADMIN — ROBINUL 0.6 MG: 0.2 INJECTION INTRAMUSCULAR; INTRAVENOUS at 08:08

## 2018-08-08 RX ADMIN — FENTANYL CITRATE 50 MCG: 50 INJECTION, SOLUTION INTRAMUSCULAR; INTRAVENOUS at 09:08

## 2018-08-08 RX ADMIN — PROPOFOL 200 MG: 10 INJECTION, EMULSION INTRAVENOUS at 07:08

## 2018-08-08 NOTE — ANESTHESIA RELEASE NOTE
"Anesthesia Release from PACU Note    Patient: Eugenie Bennett    Procedure(s) Performed: Procedure(s) (LRB):  SEPTOPLASTY, NASAL (N/A)  CAUTERIZATION, MUCOSA OF NASAL TURBINATE (Bilateral)  REPAIR, STENOSIS, NOSE, VESTIBULE (Bilateral)    Anesthesia type: general    Post pain: Adequate analgesia    Post assessment: no apparent anesthetic complications, tolerated procedure well and no evidence of recall    Last Vitals:   Visit Vitals  /68 (BP Location: Right arm, Patient Position: Sitting)   Pulse 70   Temp 36.7 °C (98.1 °F) (Temporal)   Resp 16   Ht 6' 3" (1.905 m)   Wt 91 kg (200 lb 9.9 oz)   SpO2 100%   BMI 25.08 kg/m²       Post vital signs: stable    Level of consciousness: responds to stimulation    Nausea/Vomiting: no nausea/no vomiting    Complications: none    Airway Patency: patent    Respiratory: unassisted    Cardiovascular: stable and blood pressure at baseline    Hydration: euvolemic       "

## 2018-08-08 NOTE — ANESTHESIA PREPROCEDURE EVALUATION
08/08/2018  Eugenie Bennett is a 38 y.o., male.    Anesthesia Evaluation    I have reviewed the Patient Summary Reports.    I have reviewed the Nursing Notes.      Review of Systems  Anesthesia Hx:  No problems with previous Anesthesia    Social:  Non-Smoker, Alcohol Use    Hematology/Oncology:  Hematology Normal   Oncology Normal     EENT/Dental:   Deviated septum   Cardiovascular:  Cardiovascular Normal     Renal/:  Renal/ Normal     Hepatic/GI:   GERD    Musculoskeletal:  Musculoskeletal Normal    Neurological:  Neurology Normal    Endocrine:  Endocrine Normal    Dermatological:  Skin Normal    Psych:   ADHD         Physical Exam  General:  Well nourished    Airway/Jaw/Neck:  Airway Findings: Mouth Opening: Normal General Airway Assessment: Adult  TM Distance: Normal, at least 6 cm  Jaw/Neck Findings:      Dental:  Dental Findings: In tact   Chest/Lungs:  Chest/Lungs Findings: Clear to auscultation, Normal Respiratory Rate     Heart/Vascular:  Heart Findings: Rate: Normal  Rhythm: Regular Rhythm        Mental Status:  Mental Status Findings:  Cooperative, Alert and Oriented         Anesthesia Plan  Type of Anesthesia, risks & benefits discussed:  Anesthesia Type:  general  Patient's Preference:   Intra-op Monitoring Plan: standard ASA monitors  Intra-op Monitoring Plan Comments:   Post Op Pain Control Plan: IV/PO Opioids PRN  Post Op Pain Control Plan Comments:   Induction:   IV  Beta Blocker:  Patient is not currently on a Beta-Blocker (No further documentation required).       Informed Consent: Patient understands risks and agrees with Anesthesia plan.  Questions answered. Anesthesia consent signed with patient.  ASA Score: 2     Day of Surgery Review of History & Physical: I have interviewed and examined the patient. I have reviewed the patient's H&P dated:            Ready For Surgery From  Anesthesia Perspective.

## 2018-08-08 NOTE — TRANSFER OF CARE
"Anesthesia Transfer of Care Note    Patient: Eugenie Bennett    Procedure(s) Performed: Procedure(s) (LRB):  SEPTOPLASTY, NASAL (N/A)  CAUTERIZATION, MUCOSA OF NASAL TURBINATE (Bilateral)  REPAIR, STENOSIS, NOSE, VESTIBULE (Bilateral)    Patient location: PACU    Anesthesia Type: general    Transport from OR: Transported from OR on room air with adequate spontaneous ventilation    Post pain: adequate analgesia    Post assessment: no apparent anesthetic complications    Post vital signs: stable    Level of consciousness: responds to stimulation    Nausea/Vomiting: no nausea/vomiting    Complications: none    Transfer of care protocol was followed      Last vitals:   Visit Vitals  /68 (BP Location: Right arm, Patient Position: Sitting)   Pulse 70   Temp 36.7 °C (98.1 °F) (Temporal)   Resp 16   Ht 6' 3" (1.905 m)   Wt 91 kg (200 lb 9.9 oz)   SpO2 100%   BMI 25.08 kg/m²     "

## 2018-08-08 NOTE — DISCHARGE INSTRUCTIONS
Septoplasty and Turbinate Reduction Post Operative Instructions    Rarely, is nasal packing (absorbent bandage) required (less than 5% of the time). This will be remove before you go home, or in some cases, on your first post-up appointment.  Frequently, splints are placed to hold the septum in position while it is healing.  This is usually removed at your first post operative visit.    Slight trickling of blood in the back of your throat, or on your drip pad in front of your nose, and/or crusting of secretions for the first few days, is to be expected. You should change your drip pad periodically during the day as needed. This may be necessary for the first 3-5 days after surgery. You may also clean the hair-baring area of the nose daily, as needed, using a Q-tip and hydrogen peroxide. Keep your fingers out of your nose.    Diet: You may experience nausea after surgery. Avoid heavy meals on that day. Wait 24 hours to gradually resume your normal diet. Drink plenty of fluids to maintain hydration (6-8 glasses daily minimum). Avoid alcohol or caffeine the first week after surgery.    Restricted Activities: the first week after surgery, your sinuses will feel very full and congested, this is due to the accumulation of dried blood and/or mucus crust. Do not plan to blow your nose, bend over or lift heavy objects (over 10 lbs) for the first week after surgery. This may result in a nose bleed. Rest and do not exert or overheat yourself with exercise or other physical activity.    Post-Operative pain: pain medication (Percocet or Acetaminophen with codeine) and/or anti-inflammatory medication (prednisone) may be ordered for significant post-operative pain, (FOR HEADACHES). You can still take extra-strength acetaminophen if the pain is not too uncomfortable, to avoid the occasional side-affects of nausea, constipation, or grogginess, associated with all narcotics. Use stool softener such as: dulcolax or Miralax to prevent  constipation. Do not drive or handle heavy machinery while on narcotics. Throat discomfort is not too uncommon after endotracheal intubation during general anesthesia. This should resolve on it's own within 5 days. Throat lozenges or gargles tend to soothe the discomfort. Plane travel is allowed after your first post-op visit.    Do not take aspirin or anti-coagulant therapy 7-10 days after surgery unless otherwise indicated by your surgeon at time of discharge. If you are on Coumadin you will likely restart your normal dosage 24 hours after surgery. However, check with your doctor first.    Post-operative Office Visits: The first visit will be 7-10 days after surgery. At this time the physician will clean your sinus cavity by removing dried blood and/or mucus crusting to promote healing, to minimize the chance for secondary bacterial infection, and relieves congestion and/or headaches. It is suggested that you take a couple of narcotics pills 1 hour prior to your appointment. This is especially important for your first debridement, when your internal nose is still slightly swollen and tender.    If indicated by the physician, patient may start nasal irrigations, such as Sinus Rinse, or resume any other previous treatment.    Signs and symptoms that MUST be reported to the physician/nurse immediately by [phone]. *Fever over 102 degree F. *Persistent bleeding with more that ¼ cup of bright red blood within a short period of time (half hour). *Severe pain unrelieved by prescribed medication. *Mental or visual changes (confusion, slurred speech, double- vision, visual loss).    * If you experience difficulty breathing, shortness of breath, or severe bleeding, call 911 or go to the nearest emergency room.        KEEPING THE NASAL CAVITIES VERY MOIST IS EXTREMELY IMPORTANT TO PREVENT CRUSTING AND SCARRING.  THE EASIEST WAY TO DO THIS IS WITH AN AEROSOL SALINE SPRAY SUCH AS THE ONE BELOW.  USE THIS 5-7 TIMES  DAILY FOR THE FIRST WEEK THEN 3-5 DAILY FOR THE SECOND WEEK.  AFTER THIS USE THIS TWICE DAILY FOR A MONTH AFTER SURGERY.                  General Information:    1. Do not drink alcoholic beverages including beer for 24 hours or as long as you are on pain medication..  2. Do not drive a motor vehicle, operate machinery or power tools, or signs legal papers for 24 hours or as long as you are on pain medication.   3. You may experience light-headedness, dizziness, and sleepiness following surgery. Please do not stay alone. A responsible adult should be with you for this 24 hour period.  4. Go home and rest.  5. Progress slowly to a normal diet unless instructed.  Otherwise, begin with liquids such as soft drinks, then soup and crackers working up to solid foods. Drink plenty of nonalcoholic fluids.  6. Certain anesthetics and pain medications produce nausea and vomiting in certain individuals. If nausea becomes a problem at home, call you doctor.  7. A nurse will be calling you sometime after surgery. Do not be alarmed. This is our way of finding out how you are doing.  8. Several times every hour while you are awake, take 2-3 deep breaths and cough. If you had stomach surgery hold a pillow or rolled towel firmly against your stomach before you cough. This will help with any pain the cough might cause.  9. Several times every hour while you are awake, pump and flex your feet 5-6 times and do foot circles. This will help prevent blood clots.  10. Call your doctor for severe pain, bleeding, fever, or signs or symptoms of infection (pain, swelling, redness, foul odor, drainage).  11. You can contact your doctor anytime by [phone] for the [facility] (at [facility]) or [phone] for the [facility] on [facility].   [email or web address] is another way to contact your doctor if you are an active participant online with [facility].

## 2018-08-08 NOTE — H&P
Patient ID: Eugenie Bennett is a 37 y.o. male.     Chief Complaint: Other (Pt states that he cant breath out of his nose and is having sob since November 2017)     Mr. Bennett is a very pleasant 36 yo male here to see me today for chronic nasal congestion. He states that his symptoms started in November and have become worse. He is currently on flonase, singulair and xyzal regularly with little relief. He is having no other symptoms. He states that he is unable to work due to not being able to breathe out of his nose. He does have occasional nose bleeds and did have a fracture of his nose during childhood.      Review of Systems   Constitutional: Negative for fatigue, fever and unexpected weight change.   HENT: Negative for congestion (nasal), ear discharge, ear pain, facial swelling, hearing loss, nosebleeds, postnasal drip, rhinorrhea, sinus pressure, sneezing, sore throat, tinnitus, trouble swallowing and voice change.    Eyes: Negative for discharge, redness and itching.   Respiratory: Negative for cough, choking, shortness of breath and wheezing.    Cardiovascular: Negative for chest pain and palpitations.   Gastrointestinal: Negative for abdominal pain.        No reflux.   Musculoskeletal: Negative for neck pain.   Neurological: Negative for dizziness, facial asymmetry, light-headedness and headaches.   Hematological: Negative for adenopathy. Does not bruise/bleed easily.   Psychiatric/Behavioral: Negative for agitation, behavioral problems, confusion and decreased concentration.       Objective:      Physical Exam   Constitutional: He is oriented to person, place, and time. Vital signs are normal. He appears well-developed and well-nourished. No distress.   HENT:   Head: Normocephalic and atraumatic.   Right Ear: Hearing, tympanic membrane, external ear and ear canal normal.   Left Ear: Hearing, tympanic membrane, external ear and ear canal normal.   Nose: Nasal deformity present with dorsal  deviation to the right. No mucosal edema, rhinorrhea.  Septum deviated to the left.  + Jacob maneuver.  Alar collapse on inspiration.  Mild turbinate hypertrophy.                      Mouth/Throat: Uvula is midline, oropharynx is clear and moist and mucous membranes are normal. No trismus in the jaw. Normal dentition. No uvula swelling. No oropharyngeal exudate or posterior oropharyngeal edema.   Eyes: Conjunctivae and EOM are normal. Pupils are equal, round, and reactive to light. Right eye exhibits no chemosis. Left eye exhibits no chemosis. Right conjunctiva is not injected. Left conjunctiva is not injected. No scleral icterus.   Neck: Trachea normal and phonation normal. No tracheal tenderness present. No tracheal deviation present. No thyroid mass and no thyromegaly present.   Cardiovascular: Intact distal pulses.    Pulmonary/Chest: Effort normal. No accessory muscle usage or stridor. No respiratory distress.   Lymphadenopathy:        Head (right side): No submental, no submandibular, no preauricular and no posterior auricular adenopathy present.        Head (left side): No submental, no submandibular, no preauricular and no posterior auricular adenopathy present.     He has no cervical adenopathy.        Right cervical: No superficial cervical and no deep cervical adenopathy present.       Left cervical: No superficial cervical and no deep cervical adenopathy present.   Neurological: He is alert and oriented to person, place, and time. No cranial nerve deficit.   Skin: Skin is warm and dry. No rash noted. No erythema.   Psychiatric: He has a normal mood and affect. His behavior is normal. Thought content normal.       Assessment:       1. Hypertrophy, nasal, turbinate    2. Deviated septum    3.       Nasal valve collapse    Plan:       Nasal obstruction, multifactorial     Recommend septoplasty, turbinate reduction, repair of nasal valve stenosis.  We reviewed in detail the plan for surgery as well as the  risks, benefits and alternatives. We discussed that the risks included, but were not limited to, cosmetic deformity, persistent nasal obstruction, epistaxis, infection, septal perforation and delayed wound healing.  Eugenie asked several questions which I answered to the patient's satisfaction.  They would like to proceed with surgery as planned.

## 2018-08-08 NOTE — OP NOTE
Operative Note       Surgery Date: 8/8/2018     Surgeon: Rocky Garcia MD    Assistants:  None    Implants:  None    Pre-op Diagnosis:  Nasal obstruction, Septal deviation, Nasal turbinate hypertrophy    Post-op Diagnosis: same    Anesthesia: GETA    Technical Procedures Used:     Septoplasty  Submucosal resection of interior turbinates, bilateral    Procedure in Detail/Findings:    The septum was found to have a LEFT moderate deviation  Right nasal turbinate moderate soft tissue component and moderate bone component.  Left nasal turbinate little soft tissue component and moderate bone component.    Complications: No    Estimated Blood Loss: less than 50cc          Specimens     None          Justification for the operation:     Eugenie has persistent nasal obstruction and been on adequate medical therapy including topical steroids and antihistamines.  There is evidence on nasal endoscopy of septal deviation.  There is evidence on nasal endoscopy of inferior turbinate hypertrophy.  We discussed at length the risk of sinus surgery including, but not limited to, recurrence of disease, bleeding, infection, septal perforation, tooth or cheek numbness, vision changes, orbital injury, CSF leak and changes in smell.  The patient had opportunity to ask questions and I answered all of them to their satisfaction.  Written informed consent was obtained.      Procedure in Detail:      Prior to starting the case, the eyes were protected with OpSites and the nose was decongested bilaterally with Oxymetazoline-soaked cottonoids.  We used an endoscope to analyse the nasal cavity and then we infiltrated 1% Lidocaine with 1:100,000 Epinephrine into the nasal septum, nasal turbinate - inferior on both sides.    We then performed a septoplasty.  A 15 blade was used to make an incision in the left septal mucosa in the form of a [place] incision (about 1cm from the caudal septal margin).  We elevated the mucoperichondrium away from the  cartilage and bone.  The deviated septal cartilage and bone were removed.  Care was taken to leave at least 2 cm of caudal and dorsal cartilage intact for support.  The mucoperichondrial flap was repositioned in the midline.  Suture closure of the septal mucosa was required / placed.    The patient still required caudal deviation correction.  We released the caudal septum from bilateral mucoperichondrial flaps and the nasal floor.  The caudal septum was repositioned in the midline after resection of excess  maxillary crest.  The caudal septal base was sutured onto the midline maxillary crest and the mucosa was closed. Then the septum was in the midline.    We then performed a submucosal reduction of the inferior turbinates, bilaterally.  We started on the right side.  The tissue at the head and tail of the turbinate were significantly reduced.  We incised the inferior border of the inferior turbinate and elevated the medial mucosa away from underlying bony turbinate.  We resected the inferolateral edge (including the scroll area) and excess conchal bone.   Hemostasis was achieved with suction cautery, and the turbinate was then lateralized with a Turner elevator.       The same procedure was performed on the other side describing a submucosal reduction of the inferior turbinate.      Marking in preparation for the valve repair was done as follows: A surgical pen is used to tong the junction of the upper lateral cartilage and nasal bone/maxilla is also outlined. On the right side of the patient, a line was drawn starting slightly medial to the medial canthus to the upper 1/3 of the alar rim. A line was marked at 6 mm cranial to the bone/cartilage junction along the line as the most distal placement of the implant. Creation of lateral cartilage support was performed as follows: One (1%) percent lidocaine with epinephrine was infused along the outlined track of implantation. The polymer implant was loaded into a sterile  delivery device which includes a 16 gauge cannula.   The nasal mucosa was incised immediately cranial to the alar rim and the cannula was advanced through the incision along the line drawn. Using the cannula, a dissection plane was created over the upper lateral cartilage. At the junction with the nasal aspect of the maxillary bone, the cannula was brought superficial to the bone. Using the 16-gauge cannula, a dissection plane was created above the periosteum on the superficial aspect of the maxillary bone. The cannula tip was advanced to the target marked 6 mm cranial to the steve/cartilaginous junction using palpation on the surface of the skin. Once the destination was obtained and the dissection was completed, the skin on the nose was normalized, and the cannula was inspected to determine if its tip was at a sufficient depth. Once it was determined that the cannula tip was at a sufficient depth, the polymer implant was delivered and the cannula withdrawn. Thus the lateral cartilage was supported. The entry wound was inspected to determine if suture closure was necessary. There was no bleeding at the insertion site and closure of the wound was deemed unnecessary.    The same procedure was performed on the left side of the patient.              Disposition: PACU - hemodynamically stable.           Condition: Good    Attestation:  I was present and scrubbed for the entire procedure.

## 2018-08-08 NOTE — DISCHARGE SUMMARY
[facility]  Discharge Note  Short Stay    Admit Date: 8/8/2018    Discharge Date and Time: 08/08/2018 9:29 AM     Attending Physician: Rocky Garcia    Discharge Provider: Rocky Garcia    Diagnoses:  Active Hospital Problems    Diagnosis  POA   No active problems to display.      Resolved Hospital Problems    Diagnosis Date Resolved POA    Nasal valve collapse [J34.89] 08/08/2018 Yes    Nasal turbinate hypertrophy [J34.3] 08/08/2018 Yes    Deviated nasal septum [J34.2] 08/08/2018 Yes       Discharged Condition: good    Hospital Course: Patient was admitted for an outpatient procedure and tolerated the procedure well with no complications.    Final Diagnoses: Same as principle problem    Disposition: Home or Self Care    Follow up/Patient Instructions:    Medications:  Reconciled Home Medications: Current Discharge Medication List      START taking these medications    Details   amoxicillin-clavulanate 875-125mg (AUGMENTIN) 875-125 mg per tablet Take 1 tablet by mouth 2 (two) times daily. for 14 days  Qty: 28 tablet, Refills: 0      ondansetron (ZOFRAN-ODT) 4 MG TbDL Take 2 tablets (8 mg total) by mouth every 8 (eight) hours as needed.  Qty: 10 tablet, Refills: 0      oxyCODONE-acetaminophen (PERCOCET) 5-325 mg per tablet Take 1 tablet by mouth every 4 (four) hours as needed for Pain.  Qty: 30 tablet, Refills: 0         CONTINUE these medications which have NOT CHANGED    Details   dextroamphetamine-amphetamine (ADDERALL) 20 mg tablet Take 3 tablets (60 mg total) by mouth once daily.  Qty: 90 tablet, Refills: 0      fluticasone (FLONASE) 50 mcg/actuation nasal spray SHAKE LIQUID AND USE 2 SPRAYS(100 MCG) IN EACH NOSTRIL EVERY DAY  Qty: 16 g, Refills: 6    Associated Diagnoses: Acute non-recurrent maxillary sinusitis      pantoprazole (PROTONIX) 40 MG tablet Take 1 tablet (40 mg total) by mouth once daily.  Qty: 30 tablet, Refills: 6      ranitidine (ZANTAC) 150 MG tablet Take 1 tablet (150 mg total) by mouth  nightly.  Qty: 30 tablet, Refills: 3                 Discharge Procedure Orders (must include Diet, Follow-up, Activity):    Discharge Procedure Orders (must include Diet, Follow-up, Activity)  Diet general     No dressing needed     Call MD for:  persistent nausea and vomiting     Call MD for:  severe uncontrolled pain          Follow-up Information     Reina Hoff PA-C In 1 week.    Specialty:  Otolaryngology  Contact information:  [address]  [place] [address] [phone]

## 2018-08-09 NOTE — ANESTHESIA POSTPROCEDURE EVALUATION
"Anesthesia Post Evaluation    Patient: Eugenie Bennett    Procedure(s) Performed: Procedure(s) (LRB):  SEPTOPLASTY, NASAL (N/A)  CAUTERIZATION, MUCOSA OF NASAL TURBINATE (Bilateral)  REPAIR, STENOSIS, NOSE, VESTIBULE (Bilateral)    Final Anesthesia Type: general  Patient location during evaluation: PACU  Patient participation: Yes- Able to Participate  Level of consciousness: awake and alert and oriented  Post-procedure vital signs: reviewed and stable  Pain management: adequate  Airway patency: patent  PONV status at discharge: No PONV  Anesthetic complications: no      Cardiovascular status: hemodynamically stable  Respiratory status: unassisted, room air and spontaneous ventilation  Hydration status: euvolemic  Follow-up not needed.        Visit Vitals  /75   Pulse 85   Temp 36.8 °C (98.3 °F) (Temporal)   Resp 16   Ht 6' 3" (1.905 m)   Wt 91 kg (200 lb 9.9 oz)   SpO2 97%   BMI 25.08 kg/m²       Pain/Krystal Score: Pain Assessment Performed: Yes (8/8/2018  9:30 AM)  Presence of Pain: denies (8/8/2018  9:30 AM)  Pain Rating Prior to Med Admin: 4 (8/8/2018  9:25 AM)  Krystal Score: 10 (8/8/2018  9:30 AM)      "

## 2018-08-14 ENCOUNTER — PATIENT MESSAGE (OUTPATIENT)
Dept: SURGERY | Facility: HOSPITAL | Age: 38
End: 2018-08-14

## 2018-08-17 ENCOUNTER — OFFICE VISIT (OUTPATIENT)
Dept: OTOLARYNGOLOGY | Facility: CLINIC | Age: 38
End: 2018-08-17
Payer: COMMERCIAL

## 2018-08-17 VITALS
BODY MASS INDEX: 25.3 KG/M2 | TEMPERATURE: 98 F | SYSTOLIC BLOOD PRESSURE: 129 MMHG | DIASTOLIC BLOOD PRESSURE: 94 MMHG | HEART RATE: 103 BPM | WEIGHT: 202.38 LBS

## 2018-08-17 DIAGNOSIS — Z98.890 POST-OPERATIVE STATE: Primary | ICD-10-CM

## 2018-08-17 PROCEDURE — 99024 POSTOP FOLLOW-UP VISIT: CPT | Mod: S$GLB,,, | Performed by: OTOLARYNGOLOGY

## 2018-08-17 PROCEDURE — 99999 PR PBB SHADOW E&M-EST. PATIENT-LVL III: CPT | Mod: PBBFAC,,, | Performed by: OTOLARYNGOLOGY

## 2018-08-17 NOTE — PATIENT INSTRUCTIONS
PLEASE PERFORM SINUS RINSES 3-4 TIMES DAILY UNTIL YOUR NEXT VISIT.          DIRECTIONS FOR SINUS SALINE RINSE To see demonstration: Enter http://www.youtube.com/watch?[email or web address] into the browser address box, or go to You tube, and under the search box, enter sinus rinse. Click on [facility] Sinus Rinse Video    Step 1    Step 1 Please wash your hands. Fill the clean bottle with the designated volume of warm distilled water, filtered water or previously boiled water. You may warm the water in a microwave but we recommend that you warm it in increments of five seconds. This is to avoid excessive heating of the water and damage to the device or scalding your nasal passage.    Step 2    Step 2 Cut the SINUS RINSE mixture packet at the corner and pour its contents into the bottle. Tighten the cap & tube on the bottle securely, place one finger over the tip of the cap and shake the bottle gently to dissolve the mixture.      Step 3    Step 3 Standing in front of a sink, bend forward to your comfort level and tilt your head down. Keeping your mouth open without holding your breath, place cap snugly against your nasal passage and SQUEEZE BOTTLE GENTLY until the solution starts draining from the OPPOSITE nasal passage or from your mouth. Keep squeezing the bottle GENTLY until at least 1/4 to 1/2 (60 to 120 mL) of the bottle is used for a proper rinse. Do not swallow the solution.    Step 4    Blow your nose gently, without pinching your nose completely because this will apply pressure on the    eardrums. If tolerable, sniff in any residual solution remaining in the nasal passage once or twice prior to    blowing your nose as this may clean out the posterior nasopharyngeal area (the area at the back of your    nasal passage). Some solution will reach the back of the throat, so please spit it out. To help improve    drainage of any residual solution, blow your nose gently while tilting your head to the opposite side  of    the nasal passage that you just rinsed.    Step 5    Now repeat steps 3 & 4 for your other nasal passage.    Step 6 Air dry the Sinus Rinse bottle, cap, and tube on a clean paper towel, a glass plate to store the bottle cap and tube. If there is any solution leftover, please discard it. We recommend you make a fresh solution each time you rinse. Rinse 5 times each day, OR as directed by your physician. Warnings: DO NOT RINSE IF NASAL PASSAGE IS COMPLETELY BLOCKED OR IF YOU HAVE AN EAR INFECTION OR BLOCKED EARS. If you have had ear surgery, please contact your physician prior to irrigation. If you experience any pressure in your ears, stop the rinse and get further directions from your physician or contact our office during regular business hours. To avoid any ear discomfort: Heat the solution to lukewarm, do not use hot, boiling or cold water. Keep your mouth open. Do not hold your breath and if possible make the sound BRANDON....BRANDON... Make sure to take the position as shown. Gently squeeze 1/4 of the bottle at a time (60mL / 2 ounces). Stop the rinse if you feel any solution sensation near the ears. You may rinse with a partially blocked nasal passage. Please do not use for any other purposes. Please rinse at least ONE HOUR PRIOR to bedtime, in order to avoid any residual solution dripping in the throat.    >> Before using the SINUS RINSE kit, please inspect the cap, tube and bottle carefully for wear and    tear. If any of the components appear discolored or cracked, please contact [employer] to obtain a    replacement. You must follow the cleaning instructions provided in this brochure or cleaning instruction    card prior to each use.    >> The SINUS RINSE bottle and mixture are to be used only for nasalirrigation. Do not use for any other    purposes.    >> We recommend that you use the rinse ONE HOUR PRIOR to bedtime in order to avoid any residual    solution dripping in the throat.    Tips to avoid ear  discomfort while rinsing    If you have had ear surgery, please contact your physician prior to irrigation. Do not use if you have an    ear infection or blocked ears. Rinse with lukewarm water. Keep your mouth open. Do not hold your    breath while rinsing. While rinsing, make sure to tilt your. Gently squeeze the bottle while rinsing; do    not squeeze the bottle very forcefully. Stop the rinse if you feel a sensation of fluid near your ears.    Tips to avoid unexpected drainage after rinsing    In rare situations, especially if you have had sinus surgery, the saline solution can pool in the sinus    cavities and nasal passages and then drip from your nostrils hours after rinsing. To avoid this harmless    but annoying inconvenience, take one extra step after rinsing: lean forward, tilt your head sideways and    gently blow your nose. Then, tilt your head to the other side and blow again. You may need to repeat    this several times. This will help rid your nasal passages of any excess mucus and remaining saline    solution. If you find yourself experiencing delayed drainage often, do not rinse right before leaving your    house or going to bed.

## 2018-08-17 NOTE — PROGRESS NOTES
Subjective:   Patient: Eugenie Bennett [id number], :1980   Visit date:2018 11:35 AM    Chief Complaint:  Follow-up    HPI:  Eugenie is a 38 y.o. male who is here for follow-up after surgery:    Subjective: Patient is 1 wk s/p septoplasty and submucosal resection of interior turbinates, bilateral. Has done well post operatively. No complications. Denies fever. Mild-moderate post op pain.     Surgery date: 18    Operations performed: Nasal Septoplasty    Pathology:  NA    Cultures:  NA    Review of Systems:  -     Allergic/Immunologic: is allergic to lortab [hydrocodone-acetaminophen]..  -     Constitutional: Current temp: 97.8 °F (36.6 °C) (Tympanic)    His meds, allergies, medical, surgical, social & family histories were reviewed & updated:  -     He has a current medication list which includes the following prescription(s): amoxicillin-clavulanate 875-125mg, dextroamphetamine-amphetamine, fluticasone, ondansetron, oxycodone-acetaminophen, pantoprazole, and ranitidine.  -     He  has a past medical history of GERD (gastroesophageal reflux disease).   -     He does not have any pertinent problems on file.   -     He  has a past surgical history that includes Tonsillectomy; Adenoidectomy; Tympanostomy tube placement; SEPTOPLASTY, NASAL (N/A, 2018); CAUTERIZATION, MUCOSA OF NASAL TURBINATE (Bilateral, 2018); and REPAIR, STENOSIS, NOSE, VESTIBULE (Bilateral, 2018).  -     He  reports that  has never smoked. His smokeless tobacco use includes chew. He reports that he drinks alcohol. He reports that he does not use drugs.  -     His family history includes Alcohol abuse in his maternal grandfather; Cancer in his maternal grandmother; Colon cancer in his mother.  -     He is allergic to lortab [hydrocodone-acetaminophen].    Objective:     Physical Exam:  Vitals:  BP (!) 129/94   Pulse 103   Temp 97.8 °F (36.6 °C) (Tympanic)   Wt 91.8 kg (202 lb 6.1 oz)   BMI 25.30 kg/m²   General  appearance:  Well developed, well nourished, no apparent distress    Surgical site:  Straight septum.  Turbinates appropriate.  Patent airways.     Assessment & Plan:     PRN.

## 2018-08-28 RX ORDER — DEXTROAMPHETAMINE SACCHARATE, AMPHETAMINE ASPARTATE, DEXTROAMPHETAMINE SULFATE AND AMPHETAMINE SULFATE 5; 5; 5; 5 MG/1; MG/1; MG/1; MG/1
60 TABLET ORAL DAILY
Qty: 90 TABLET | Refills: 0 | Status: SHIPPED | OUTPATIENT
Start: 2018-08-28 | End: 2018-09-27 | Stop reason: SDUPTHER

## 2018-09-12 ENCOUNTER — PATIENT MESSAGE (OUTPATIENT)
Dept: OTOLARYNGOLOGY | Facility: CLINIC | Age: 38
End: 2018-09-12

## 2018-09-14 ENCOUNTER — OFFICE VISIT (OUTPATIENT)
Dept: OTOLARYNGOLOGY | Facility: CLINIC | Age: 38
End: 2018-09-14
Payer: COMMERCIAL

## 2018-09-14 ENCOUNTER — PATIENT MESSAGE (OUTPATIENT)
Dept: OTOLARYNGOLOGY | Facility: CLINIC | Age: 38
End: 2018-09-14

## 2018-09-14 VITALS
HEIGHT: 75 IN | DIASTOLIC BLOOD PRESSURE: 84 MMHG | BODY MASS INDEX: 26.15 KG/M2 | HEART RATE: 89 BPM | WEIGHT: 210.31 LBS | TEMPERATURE: 97 F | SYSTOLIC BLOOD PRESSURE: 133 MMHG

## 2018-09-14 DIAGNOSIS — Z98.890 POST-OPERATIVE STATE: Primary | ICD-10-CM

## 2018-09-14 PROCEDURE — 99024 POSTOP FOLLOW-UP VISIT: CPT | Mod: S$GLB,,, | Performed by: OTOLARYNGOLOGY

## 2018-09-14 PROCEDURE — 99999 PR PBB SHADOW E&M-EST. PATIENT-LVL III: CPT | Mod: PBBFAC,,, | Performed by: OTOLARYNGOLOGY

## 2018-09-14 NOTE — PROGRESS NOTES
Subjective:   Patient: Eugenie Bennett [id number], :1980   Visit date:2018 11:35 AM    Chief Complaint:  No chief complaint on file.    HPI:  Eugenie is a 38 y.o. male who is here for follow-up after surgery:    Subjective: Patient is 5 wks s/p septoplasty and submucosal resection of interior turbinates, bilateral. Has done well post operatively. No complications. Denies fever. Mild-moderate post op pain.  He reports difficulty clearing secretions and tolerating saline rinses.  He feels like he is going to choke when he uses the saline so he has not used it much.  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  The mask pushes the Spirox toward the eye at this point on the right and is uncomfortable.  He has no problems with it when in normal activities.      Surgery date: 18    Operations performed: Nasal Septoplasty    Pathology:  NA    Cultures:  NA    Review of Systems:  -     Allergic/Immunologic: is allergic to lortab [hydrocodone-acetaminophen]..  -     Constitutional: Current temp:      His meds, allergies, medical, surgical, social & family histories were reviewed & updated:  -     He has a current medication list which includes the following prescription(s): dextroamphetamine-amphetamine, fluticasone, ondansetron, oxycodone-acetaminophen, pantoprazole, and ranitidine.  -     He  has a past medical history of GERD (gastroesophageal reflux disease).   -     He does not have any pertinent problems on file.   -     He  has a past surgical history that includes Tonsillectomy; Adenoidectomy; Tympanostomy tube placement; SEPTOPLASTY, NASAL (N/A, 2018); CAUTERIZATION, MUCOSA OF NASAL TURBINATE (Bilateral, 2018); and REPAIR, STENOSIS, NOSE, VESTIBULE (Bilateral, 2018).  -     He  reports that  has never smoked. His smokeless tobacco use includes chew. He reports that he drinks alcohol. He reports that he does not use drugs.  -     His family history includes Alcohol abuse in his  maternal grandfather; Cancer in his maternal grandmother; Colon cancer in his mother.  -     He is allergic to lortab [hydrocodone-acetaminophen].    Objective:     Physical Exam:  Vitals:  There were no vitals taken for this visit.  General appearance:  Well developed, well nourished, no apparent distress    Surgical site:  Straight septum.  Turbinates appropriate.  Patent airways.  Mild crusting on the left.  Spirox able to be mobilized superiorly when pushing hard both otherwise normal.     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  Discussed that it can be pulled back or even removed if necessary.  Use Navage if unable to tolerate regular saline rinses. He will let me know if the tape works.

## 2018-09-27 RX ORDER — DEXTROAMPHETAMINE SACCHARATE, AMPHETAMINE ASPARTATE, DEXTROAMPHETAMINE SULFATE AND AMPHETAMINE SULFATE 5; 5; 5; 5 MG/1; MG/1; MG/1; MG/1
60 TABLET ORAL DAILY
Qty: 90 TABLET | Refills: 0 | Status: SHIPPED | OUTPATIENT
Start: 2018-09-27 | End: 2018-10-26 | Stop reason: SDUPTHER

## 2018-10-19 ENCOUNTER — OFFICE VISIT (OUTPATIENT)
Dept: INTERNAL MEDICINE | Facility: CLINIC | Age: 38
End: 2018-10-19
Payer: COMMERCIAL

## 2018-10-19 VITALS
BODY MASS INDEX: 27.05 KG/M2 | HEIGHT: 74 IN | HEART RATE: 80 BPM | DIASTOLIC BLOOD PRESSURE: 70 MMHG | TEMPERATURE: 99 F | WEIGHT: 210.75 LBS | SYSTOLIC BLOOD PRESSURE: 120 MMHG

## 2018-10-19 DIAGNOSIS — J32.9 SINUSITIS, UNSPECIFIED CHRONICITY, UNSPECIFIED LOCATION: Primary | ICD-10-CM

## 2018-10-19 PROCEDURE — 99213 OFFICE O/P EST LOW 20 MIN: CPT | Mod: S$GLB,,, | Performed by: FAMILY MEDICINE

## 2018-10-19 PROCEDURE — 99999 PR PBB SHADOW E&M-EST. PATIENT-LVL III: CPT | Mod: PBBFAC,,, | Performed by: FAMILY MEDICINE

## 2018-10-19 PROCEDURE — 3008F BODY MASS INDEX DOCD: CPT | Mod: CPTII,S$GLB,, | Performed by: FAMILY MEDICINE

## 2018-10-19 RX ORDER — METHYLPREDNISOLONE 4 MG/1
TABLET ORAL
Qty: 1 PACKAGE | Refills: 0 | Status: SHIPPED | OUTPATIENT
Start: 2018-10-19 | End: 2018-10-30

## 2018-10-19 RX ORDER — AMOXICILLIN AND CLAVULANATE POTASSIUM 875; 125 MG/1; MG/1
1 TABLET, FILM COATED ORAL EVERY 12 HOURS
Qty: 20 TABLET | Refills: 0 | Status: SHIPPED | OUTPATIENT
Start: 2018-10-19 | End: 2018-10-30

## 2018-10-21 NOTE — PROGRESS NOTES
Subjective:      Patient ID: Eugenie Bennett is a 38 y.o. male.    Chief Complaint: Facial Pain (sinus issues )    HPI  39 yo male with chronic sinus issues here with c/o frontal pain and hacking out dark green chunks of mucous.   Tastes/smells terrible.  Elevated temps, no fever.  Taking xyzal.  Has nasal stents/had sinus surgery with Dr. Garcia earlier this year.  No chest symptoms.  Having GI issues, needs to f/u with GI to go thru with recommended scope    Past Medical History:   Diagnosis Date    GERD (gastroesophageal reflux disease)      Family History   Problem Relation Age of Onset    Colon cancer Mother          at 47    Cancer Maternal Grandmother         unsure of type    Alcohol abuse Maternal Grandfather         Cancer unknown type     Past Surgical History:   Procedure Laterality Date    ADENOIDECTOMY      CAUTERIZATION, MUCOSA OF NASAL TURBINATE Bilateral 2018    Performed by [facility] at [facility]    CAUTERY OF TURBINATES Bilateral 2018    Procedure: CAUTERIZATION, MUCOSA OF NASAL TURBINATE;  Surgeon: Rocky Garcia MD;  Location: [facility] OR;  Service: ENT;  Laterality: Bilateral;    NASAL SEPTOPLASTY N/A 2018    Procedure: SEPTOPLASTY, NASAL;  Surgeon: Rocky Garcia MD;  Location: [facility] OR;  Service: ENT;  Laterality: N/A;    NASAL STENOSIS REPAIR Bilateral 2018    Procedure: REPAIR, STENOSIS, NOSE, VESTIBULE;  Surgeon: Rocky Garcia MD;  Location: [facility] OR;  Service: ENT;  Laterality: Bilateral;    REPAIR, STENOSIS, NOSE, VESTIBULE Bilateral 2018    Performed by [facility] at [facility] OR    SEPTOPLASTY, NASAL N/A 2018    Performed by [facility] at [facility]    TONSILLECTOMY      TYMPANOSTOMY TUBE PLACEMENT      x 2     Social History     Tobacco Use    Smoking status: Never Smoker    Smokeless tobacco: Current User     Types: Chew    Tobacco comment: Smoker for maybe a year   Substance Use Topics    Alcohol use: Yes     Comment: Socially    Drug use: No  "      /70 (BP Location: Right arm, Patient Position: Sitting, BP Method: X-Large (Manual))   Pulse 80   Temp 99.2 °F (37.3 °C) (Tympanic)   Ht 6' 2" (1.88 m)   Wt 95.6 kg (210 lb 12.2 oz)   BMI 27.06 kg/m²     Review of Systems   Constitutional: Positive for activity change.   HENT: Positive for congestion.    Neurological: Positive for headaches.   Psychiatric/Behavioral:        Recently lost his adoptive son as well, grieving       Objective:     Physical Exam   Constitutional: He is oriented to person, place, and time. He appears well-developed and well-nourished.   HENT:   Right Ear: External ear normal.   Left Ear: External ear normal.   Mouth/Throat: Oropharynx is clear and moist.   Neck: Neck supple.   Cardiovascular: Normal rate, regular rhythm and normal heart sounds.   Pulmonary/Chest: Effort normal and breath sounds normal. No stridor. No respiratory distress.   Lymphadenopathy:     He has no cervical adenopathy.   Neurological: He is alert and oriented to person, place, and time.   Nursing note and vitals reviewed.      Lab Results   Component Value Date    WBC 7.21 01/26/2018    HGB 14.5 01/26/2018    HCT 43.2 01/26/2018     01/26/2018    CHOL 188 01/26/2018    TRIG 69 01/26/2018    HDL 45 01/26/2018    ALT 17 01/26/2018    AST 24 01/26/2018     01/26/2018    K 3.8 01/26/2018     01/26/2018    CREATININE 1.1 01/26/2018    BUN 9 01/26/2018    CO2 23 01/26/2018    TSH 1.674 06/30/2017       Assessment:     1. Sinusitis, unspecified chronicity, unspecified location         Plan:     Sinusitis, unspecified chronicity, unspecified location    Other orders  -     amoxicillin-clavulanate 875-125mg (AUGMENTIN) 875-125 mg per tablet; Take 1 tablet by mouth every 12 (twelve) hours. for 10 days  Dispense: 20 tablet; Refill: 0  -     methylPREDNISolone (MEDROL DOSEPACK) 4 mg tablet; use as directed  Dispense: 1 Package; Refill: 0    Start augmentin bid x 10 days  Start medrol dose " mónica  Hold xyzal and use some mucinex D/increase fluids  F/u PRN  See GI

## 2018-10-26 RX ORDER — DEXTROAMPHETAMINE SACCHARATE, AMPHETAMINE ASPARTATE, DEXTROAMPHETAMINE SULFATE AND AMPHETAMINE SULFATE 5; 5; 5; 5 MG/1; MG/1; MG/1; MG/1
60 TABLET ORAL DAILY
Qty: 90 TABLET | Refills: 0 | Status: SHIPPED | OUTPATIENT
Start: 2018-10-26 | End: 2018-11-23 | Stop reason: SDUPTHER

## 2018-10-30 ENCOUNTER — OFFICE VISIT (OUTPATIENT)
Dept: GASTROENTEROLOGY | Facility: CLINIC | Age: 38
End: 2018-10-30
Payer: COMMERCIAL

## 2018-10-30 VITALS
BODY MASS INDEX: 27.33 KG/M2 | DIASTOLIC BLOOD PRESSURE: 72 MMHG | WEIGHT: 212.94 LBS | HEIGHT: 74 IN | SYSTOLIC BLOOD PRESSURE: 124 MMHG

## 2018-10-30 DIAGNOSIS — R14.0 ABDOMINAL BLOATING: ICD-10-CM

## 2018-10-30 DIAGNOSIS — Z80.0 FH: COLON CANCER IN RELATIVE <50 YEARS OLD: ICD-10-CM

## 2018-10-30 DIAGNOSIS — R19.7 DIARRHEA IN ADULT PATIENT: ICD-10-CM

## 2018-10-30 DIAGNOSIS — K21.9 GASTROESOPHAGEAL REFLUX DISEASE, ESOPHAGITIS PRESENCE NOT SPECIFIED: Primary | ICD-10-CM

## 2018-10-30 DIAGNOSIS — R10.13 EPIGASTRIC ABDOMINAL PAIN: ICD-10-CM

## 2018-10-30 PROCEDURE — 99213 OFFICE O/P EST LOW 20 MIN: CPT | Mod: S$GLB,,, | Performed by: INTERNAL MEDICINE

## 2018-10-30 PROCEDURE — 3008F BODY MASS INDEX DOCD: CPT | Mod: CPTII,S$GLB,, | Performed by: INTERNAL MEDICINE

## 2018-10-30 PROCEDURE — 99999 PR PBB SHADOW E&M-EST. PATIENT-LVL III: CPT | Mod: PBBFAC,,, | Performed by: INTERNAL MEDICINE

## 2018-10-30 RX ORDER — SODIUM, POTASSIUM,MAG SULFATES 17.5-3.13G
SOLUTION, RECONSTITUTED, ORAL ORAL
Qty: 254 ML | Refills: 0 | Status: ON HOLD | OUTPATIENT
Start: 2018-10-30 | End: 2018-12-28 | Stop reason: HOSPADM

## 2018-10-30 NOTE — PROGRESS NOTES
Subjective:       Patient ID: Eugenie Bennett is a 38 y.o. male.    Chief Complaint: Gastroesophageal Reflux; Diarrhea; Emesis; and Abdominal Pain    Patient with history of teresa-umbilical abdominal pain, increased belching, heartburn and gas, is also known to have a FH of Colon Cancer in his mother who passed from this disease at age 47. The patient also had history of diarrhea which turned out to be constipation with overflow. He was scheduled in July last year for evaluation but insurance coverage was inadequate for him to have this done. Out of pocket cost was too high. He is now ready to schedule.      Review of Systems    Objective:      Physical Exam   Constitutional: He is oriented to person, place, and time. He appears well-developed and well-nourished. No distress.   HENT:   Head: Normocephalic and atraumatic.   Nose: Nose normal.   Mouth/Throat: Oropharynx is clear and moist. No oropharyngeal exudate.   Eyes: Conjunctivae are normal. Pupils are equal, round, and reactive to light. No scleral icterus.   Neck: Normal range of motion. Neck supple. No thyromegaly present.   Cardiovascular: Normal rate and regular rhythm. Exam reveals no gallop and no friction rub.   No murmur heard.  Pulmonary/Chest: Effort normal and breath sounds normal. No respiratory distress. He has no wheezes. He has no rales.   Abdominal: Soft. Bowel sounds are normal. He exhibits no distension and no mass. There is no tenderness. There is no rebound and no guarding.   Musculoskeletal: He exhibits no edema or tenderness.   Lymphadenopathy:     He has no cervical adenopathy.   Neurological: He is alert and oriented to person, place, and time. He exhibits normal muscle tone. Coordination normal.   Skin: Skin is warm. No rash noted. He is not diaphoretic.   Psychiatric: He has a normal mood and affect. His behavior is normal. Judgment and thought content normal.   Vitals reviewed.      Assessment:    GERD   Epigastric abdominal  Pain   Abdominal bloating   Diarrhea   FH of Colon Cancer  No diagnosis found.    Plan:   EGD  Colonoscopy

## 2018-11-25 RX ORDER — DEXTROAMPHETAMINE SACCHARATE, AMPHETAMINE ASPARTATE, DEXTROAMPHETAMINE SULFATE AND AMPHETAMINE SULFATE 5; 5; 5; 5 MG/1; MG/1; MG/1; MG/1
60 TABLET ORAL DAILY
Qty: 90 TABLET | Refills: 0 | Status: SHIPPED | OUTPATIENT
Start: 2018-11-25 | End: 2018-12-26 | Stop reason: SDUPTHER

## 2018-11-27 ENCOUNTER — PATIENT MESSAGE (OUTPATIENT)
Dept: INTERNAL MEDICINE | Facility: CLINIC | Age: 38
End: 2018-11-27

## 2018-11-27 ENCOUNTER — PATIENT MESSAGE (OUTPATIENT)
Dept: OTOLARYNGOLOGY | Facility: CLINIC | Age: 38
End: 2018-11-27

## 2018-11-28 ENCOUNTER — PATIENT MESSAGE (OUTPATIENT)
Dept: OTOLARYNGOLOGY | Facility: CLINIC | Age: 38
End: 2018-11-28

## 2018-12-07 ENCOUNTER — OFFICE VISIT (OUTPATIENT)
Dept: INTERNAL MEDICINE | Facility: CLINIC | Age: 38
End: 2018-12-07
Payer: COMMERCIAL

## 2018-12-07 ENCOUNTER — PATIENT MESSAGE (OUTPATIENT)
Dept: OTOLARYNGOLOGY | Facility: CLINIC | Age: 38
End: 2018-12-07

## 2018-12-07 ENCOUNTER — OFFICE VISIT (OUTPATIENT)
Dept: OTOLARYNGOLOGY | Facility: CLINIC | Age: 38
End: 2018-12-07
Payer: COMMERCIAL

## 2018-12-07 VITALS
DIASTOLIC BLOOD PRESSURE: 84 MMHG | BODY MASS INDEX: 27.16 KG/M2 | WEIGHT: 211.63 LBS | HEART RATE: 78 BPM | SYSTOLIC BLOOD PRESSURE: 118 MMHG | HEIGHT: 74 IN | TEMPERATURE: 99 F

## 2018-12-07 VITALS — WEIGHT: 211.63 LBS | TEMPERATURE: 98 F | BODY MASS INDEX: 27.17 KG/M2 | RESPIRATION RATE: 17 BRPM

## 2018-12-07 DIAGNOSIS — F90.2 ATTENTION DEFICIT HYPERACTIVITY DISORDER (ADHD), COMBINED TYPE: Primary | ICD-10-CM

## 2018-12-07 DIAGNOSIS — J01.90 ACUTE BACTERIAL RHINOSINUSITIS: ICD-10-CM

## 2018-12-07 DIAGNOSIS — G89.29 CHRONIC RIGHT SHOULDER PAIN: ICD-10-CM

## 2018-12-07 DIAGNOSIS — M25.511 CHRONIC RIGHT SHOULDER PAIN: ICD-10-CM

## 2018-12-07 DIAGNOSIS — B96.89 ACUTE BACTERIAL RHINOSINUSITIS: ICD-10-CM

## 2018-12-07 DIAGNOSIS — R09.81 NASAL CONGESTION: Primary | ICD-10-CM

## 2018-12-07 PROCEDURE — 99214 OFFICE O/P EST MOD 30 MIN: CPT | Mod: S$GLB,,, | Performed by: PHYSICIAN ASSISTANT

## 2018-12-07 PROCEDURE — 99999 PR PBB SHADOW E&M-EST. PATIENT-LVL III: CPT | Mod: PBBFAC,,, | Performed by: PHYSICIAN ASSISTANT

## 2018-12-07 PROCEDURE — 99213 OFFICE O/P EST LOW 20 MIN: CPT | Mod: S$GLB,,, | Performed by: FAMILY MEDICINE

## 2018-12-07 PROCEDURE — 3008F BODY MASS INDEX DOCD: CPT | Mod: CPTII,S$GLB,, | Performed by: PHYSICIAN ASSISTANT

## 2018-12-07 PROCEDURE — 3008F BODY MASS INDEX DOCD: CPT | Mod: CPTII,S$GLB,, | Performed by: FAMILY MEDICINE

## 2018-12-07 PROCEDURE — 99999 PR PBB SHADOW E&M-EST. PATIENT-LVL III: CPT | Mod: PBBFAC,,, | Performed by: FAMILY MEDICINE

## 2018-12-07 RX ORDER — PREDNISONE 20 MG/1
TABLET ORAL
Qty: 20 TABLET | Refills: 0 | Status: SHIPPED | OUTPATIENT
Start: 2018-12-07 | End: 2019-01-03 | Stop reason: ALTCHOICE

## 2018-12-07 RX ORDER — AMOXICILLIN AND CLAVULANATE POTASSIUM 875; 125 MG/1; MG/1
1 TABLET, FILM COATED ORAL EVERY 12 HOURS
Qty: 28 TABLET | Refills: 0 | Status: SHIPPED | OUTPATIENT
Start: 2018-12-07 | End: 2018-12-21

## 2018-12-07 RX ORDER — LEVOCETIRIZINE DIHYDROCHLORIDE 5 MG/1
5 TABLET, FILM COATED ORAL NIGHTLY
Qty: 30 TABLET | Refills: 11 | Status: SHIPPED | OUTPATIENT
Start: 2018-12-07 | End: 2019-02-15

## 2018-12-07 RX ORDER — MELOXICAM 15 MG/1
15 TABLET ORAL DAILY PRN
Qty: 30 TABLET | Refills: 0 | Status: SHIPPED | OUTPATIENT
Start: 2018-12-07 | End: 2019-02-01

## 2018-12-07 NOTE — H&P (VIEW-ONLY)
Subjective:      Patient ID: Eugenie Bennett is a 38 y.o. male.    Chief Complaint: Follow-up (6 mo )    HPI   39 yo male here for f/u on ADD.  Stable on adderall with no problems/SE.  Having a lot of nasal issues/sinus post surgery.  Going to see ENT today.  Saw GI, got scheduled for upper/lower scope end of this month.  Has issues off/on with R shoulder.  Chronic, due to work/repetitive motions.  Hurting now.  Prior xrays normal.    Past Medical History:   Diagnosis Date    GERD (gastroesophageal reflux disease)      Family History   Problem Relation Age of Onset    Colon cancer Mother          at 47    Cancer Maternal Grandmother         unsure of type    Alcohol abuse Maternal Grandfather         Cancer unknown type     Past Surgical History:   Procedure Laterality Date    ADENOIDECTOMY      CAUTERIZATION, MUCOSA OF NASAL TURBINATE Bilateral 2018    Performed by [facility] at [facility]    CAUTERY OF TURBINATES Bilateral 2018    Procedure: CAUTERIZATION, MUCOSA OF NASAL TURBINATE;  Surgeon: Rocky Garcia MD;  Location: [facility] OR;  Service: ENT;  Laterality: Bilateral;    NASAL SEPTOPLASTY N/A 2018    Procedure: SEPTOPLASTY, NASAL;  Surgeon: Rocky Garcia MD;  Location: [facility] OR;  Service: ENT;  Laterality: N/A;    NASAL STENOSIS REPAIR Bilateral 2018    Procedure: REPAIR, STENOSIS, NOSE, VESTIBULE;  Surgeon: Rocky Garcia MD;  Location: [facility] OR;  Service: ENT;  Laterality: Bilateral;    REPAIR, STENOSIS, NOSE, VESTIBULE Bilateral 2018    Performed by [facility] MD at [facility]    SEPTOPLASTY, NASAL N/A 2018    Performed by [facility] at [facility]    TONSILLECTOMY      TYMPANOSTOMY TUBE PLACEMENT      x 2     Social History     Tobacco Use    Smoking status: Never Smoker    Smokeless tobacco: Current User     Types: Chew    Tobacco comment: Smoker for maybe a year   Substance Use Topics    Alcohol use: Yes     Frequency: 2-3 times a week     Drinks per session: 5 or 6      "Binge frequency: Weekly    Drug use: No       /84 (BP Location: Left arm, Patient Position: Sitting, BP Method: X-Large (Manual))   Pulse 78   Temp 98.5 °F (36.9 °C) (Oral)   Ht 6' 2" (1.88 m)   Wt 96 kg (211 lb 10.3 oz)   BMI 27.17 kg/m²     Review of Systems   Respiratory: Negative for shortness of breath.    Cardiovascular: Negative for palpitations.       Objective:     Physical Exam   Constitutional: He appears well-developed and well-nourished.   Cardiovascular: Normal rate, regular rhythm and normal heart sounds.   Pulmonary/Chest: Effort normal and breath sounds normal. No stridor. No respiratory distress. He has no wheezes.   Musculoskeletal: Normal range of motion. He exhibits no edema, tenderness or deformity.   Nursing note and vitals reviewed.      Lab Results   Component Value Date    WBC 7.21 01/26/2018    HGB 14.5 01/26/2018    HCT 43.2 01/26/2018     01/26/2018    CHOL 188 01/26/2018    TRIG 69 01/26/2018    HDL 45 01/26/2018    ALT 17 01/26/2018    AST 24 01/26/2018     01/26/2018    K 3.8 01/26/2018     01/26/2018    CREATININE 1.1 01/26/2018    BUN 9 01/26/2018    CO2 23 01/26/2018    TSH 1.674 06/30/2017       Assessment:     1. Attention deficit hyperactivity disorder (ADHD), combined type    2. Chronic right shoulder pain         Plan:     Attention deficit hyperactivity disorder (ADHD), combined type    Chronic right shoulder pain    Other orders  -     meloxicam (MOBIC) 15 MG tablet; Take 1 tablet (15 mg total) by mouth daily as needed for Pain.  Dispense: 30 tablet; Refill: 0    cont Adderall 60mg daily  Mobic daily PRN for shoulder pain  Keep specialty appt  F/u with me 6 mos  "

## 2018-12-07 NOTE — PROGRESS NOTES
Subjective:      Patient ID: Eugenie Bennett is a 38 y.o. male.    Chief Complaint: Follow-up (6 mo )    HPI   39 yo male here for f/u on ADD.  Stable on adderall with no problems/SE.  Having a lot of nasal issues/sinus post surgery.  Going to see ENT today.  Saw GI, got scheduled for upper/lower scope end of this month.  Has issues off/on with R shoulder.  Chronic, due to work/repetitive motions.  Hurting now.  Prior xrays normal.    Past Medical History:   Diagnosis Date    GERD (gastroesophageal reflux disease)      Family History   Problem Relation Age of Onset    Colon cancer Mother          at 47    Cancer Maternal Grandmother         unsure of type    Alcohol abuse Maternal Grandfather         Cancer unknown type     Past Surgical History:   Procedure Laterality Date    ADENOIDECTOMY      CAUTERIZATION, MUCOSA OF NASAL TURBINATE Bilateral 2018    Performed by [facility] at [facility] OR    CAUTERY OF TURBINATES Bilateral 2018    Procedure: CAUTERIZATION, MUCOSA OF NASAL TURBINATE;  Surgeon: Rocky Garcia MD;  Location: [facility] OR;  Service: ENT;  Laterality: Bilateral;    NASAL SEPTOPLASTY N/A 2018    Procedure: SEPTOPLASTY, NASAL;  Surgeon: Rocky Garcia MD;  Location: [facility] OR;  Service: ENT;  Laterality: N/A;    NASAL STENOSIS REPAIR Bilateral 2018    Procedure: REPAIR, STENOSIS, NOSE, VESTIBULE;  Surgeon: Rocky Garcia MD;  Location: [facility] OR;  Service: ENT;  Laterality: Bilateral;    REPAIR, STENOSIS, NOSE, VESTIBULE Bilateral 2018    Performed by Rocky Garcia MD at [facility] OR    SEPTOPLASTY, NASAL N/A 2018    Performed by Rocky Garcia MD at [facility] OR    TONSILLECTOMY      TYMPANOSTOMY TUBE PLACEMENT      x 2     Social History     Tobacco Use    Smoking status: Never Smoker    Smokeless tobacco: Current User     Types: Chew    Tobacco comment: Smoker for maybe a year   Substance Use Topics    Alcohol use: Yes     Frequency: 2-3 times a week     Drinks per session: 5 or 6      "Binge frequency: Weekly    Drug use: No       /84 (BP Location: Left arm, Patient Position: Sitting, BP Method: X-Large (Manual))   Pulse 78   Temp 98.5 °F (36.9 °C) (Oral)   Ht 6' 2" (1.88 m)   Wt 96 kg (211 lb 10.3 oz)   BMI 27.17 kg/m²     Review of Systems   Respiratory: Negative for shortness of breath.    Cardiovascular: Negative for palpitations.       Objective:     Physical Exam   Constitutional: He appears well-developed and well-nourished.   Cardiovascular: Normal rate, regular rhythm and normal heart sounds.   Pulmonary/Chest: Effort normal and breath sounds normal. No stridor. No respiratory distress. He has no wheezes.   Musculoskeletal: Normal range of motion. He exhibits no edema, tenderness or deformity.   Nursing note and vitals reviewed.      Lab Results   Component Value Date    WBC 7.21 01/26/2018    HGB 14.5 01/26/2018    HCT 43.2 01/26/2018     01/26/2018    CHOL 188 01/26/2018    TRIG 69 01/26/2018    HDL 45 01/26/2018    ALT 17 01/26/2018    AST 24 01/26/2018     01/26/2018    K 3.8 01/26/2018     01/26/2018    CREATININE 1.1 01/26/2018    BUN 9 01/26/2018    CO2 23 01/26/2018    TSH 1.674 06/30/2017       Assessment:     1. Attention deficit hyperactivity disorder (ADHD), combined type    2. Chronic right shoulder pain         Plan:     Attention deficit hyperactivity disorder (ADHD), combined type    Chronic right shoulder pain    Other orders  -     meloxicam (MOBIC) 15 MG tablet; Take 1 tablet (15 mg total) by mouth daily as needed for Pain.  Dispense: 30 tablet; Refill: 0    cont Adderall 60mg daily  Mobic daily PRN for shoulder pain  Keep specialty appt  F/u with me 6 mos  "

## 2018-12-25 ENCOUNTER — PATIENT MESSAGE (OUTPATIENT)
Dept: OTOLARYNGOLOGY | Facility: CLINIC | Age: 38
End: 2018-12-25

## 2018-12-26 ENCOUNTER — PATIENT MESSAGE (OUTPATIENT)
Dept: OTOLARYNGOLOGY | Facility: CLINIC | Age: 38
End: 2018-12-26

## 2018-12-26 RX ORDER — DEXTROAMPHETAMINE SACCHARATE, AMPHETAMINE ASPARTATE, DEXTROAMPHETAMINE SULFATE AND AMPHETAMINE SULFATE 5; 5; 5; 5 MG/1; MG/1; MG/1; MG/1
60 TABLET ORAL DAILY
Qty: 90 TABLET | Refills: 0 | Status: SHIPPED | OUTPATIENT
Start: 2018-12-26 | End: 2019-01-25 | Stop reason: SDUPTHER

## 2018-12-27 ENCOUNTER — PATIENT MESSAGE (OUTPATIENT)
Dept: INTERNAL MEDICINE | Facility: CLINIC | Age: 38
End: 2018-12-27

## 2018-12-27 ENCOUNTER — PATIENT MESSAGE (OUTPATIENT)
Dept: OTOLARYNGOLOGY | Facility: CLINIC | Age: 38
End: 2018-12-27

## 2018-12-28 ENCOUNTER — HOSPITAL ENCOUNTER (OUTPATIENT)
Facility: HOSPITAL | Age: 38
Discharge: HOME | End: 2018-12-28
Attending: INTERNAL MEDICINE | Admitting: INTERNAL MEDICINE
Payer: COMMERCIAL

## 2018-12-28 ENCOUNTER — ANESTHESIA EVENT (OUTPATIENT)
Dept: ENDOSCOPY | Facility: HOSPITAL | Age: 38
End: 2018-12-28
Payer: COMMERCIAL

## 2018-12-28 ENCOUNTER — ANESTHESIA (OUTPATIENT)
Dept: ENDOSCOPY | Facility: HOSPITAL | Age: 38
End: 2018-12-28
Payer: COMMERCIAL

## 2018-12-28 VITALS
HEART RATE: 87 BPM | OXYGEN SATURATION: 100 % | TEMPERATURE: 98 F | RESPIRATION RATE: 18 BRPM | SYSTOLIC BLOOD PRESSURE: 116 MMHG | WEIGHT: 211 LBS | BODY MASS INDEX: 26.24 KG/M2 | DIASTOLIC BLOOD PRESSURE: 52 MMHG | HEIGHT: 75 IN

## 2018-12-28 DIAGNOSIS — R19.7 DIARRHEA IN ADULT PATIENT: ICD-10-CM

## 2018-12-28 DIAGNOSIS — K21.9 GASTROESOPHAGEAL REFLUX DISEASE, ESOPHAGITIS PRESENCE NOT SPECIFIED: ICD-10-CM

## 2018-12-28 DIAGNOSIS — K21.00 REFLUX ESOPHAGITIS: ICD-10-CM

## 2018-12-28 DIAGNOSIS — R10.13 EPIGASTRIC PAIN: ICD-10-CM

## 2018-12-28 DIAGNOSIS — Z80.0 FH: COLON CANCER IN RELATIVE <50 YEARS OLD: Primary | ICD-10-CM

## 2018-12-28 PROCEDURE — 27201012 HC FORCEPS, HOT/COLD, DISP: Performed by: INTERNAL MEDICINE

## 2018-12-28 PROCEDURE — 43239 PR EGD, FLEX, W/BIOPSY, SGL/MULTI: ICD-10-PCS | Mod: 51,,, | Performed by: INTERNAL MEDICINE

## 2018-12-28 PROCEDURE — 63600175 PHARM REV CODE 636 W HCPCS: Performed by: NURSE ANESTHETIST, CERTIFIED REGISTERED

## 2018-12-28 PROCEDURE — 25000003 PHARM REV CODE 250: Performed by: INTERNAL MEDICINE

## 2018-12-28 PROCEDURE — 37000009 HC ANESTHESIA EA ADD 15 MINS: Performed by: INTERNAL MEDICINE

## 2018-12-28 PROCEDURE — G0105 COLORECTAL SCRN; HI RISK IND: ICD-10-PCS | Mod: ,,, | Performed by: INTERNAL MEDICINE

## 2018-12-28 PROCEDURE — 43239 EGD BIOPSY SINGLE/MULTIPLE: CPT | Mod: 51,,, | Performed by: INTERNAL MEDICINE

## 2018-12-28 PROCEDURE — 88305 TISSUE SPECIMEN TO PATHOLOGY - SURGERY: ICD-10-PCS | Mod: 26,,, | Performed by: PATHOLOGY

## 2018-12-28 PROCEDURE — 43239 EGD BIOPSY SINGLE/MULTIPLE: CPT | Performed by: INTERNAL MEDICINE

## 2018-12-28 PROCEDURE — G0105 COLORECTAL SCRN; HI RISK IND: HCPCS | Mod: ,,, | Performed by: INTERNAL MEDICINE

## 2018-12-28 PROCEDURE — 88305 TISSUE EXAM BY PATHOLOGIST: CPT | Mod: 26,,, | Performed by: PATHOLOGY

## 2018-12-28 PROCEDURE — 88305 TISSUE EXAM BY PATHOLOGIST: CPT | Performed by: PATHOLOGY

## 2018-12-28 PROCEDURE — 37000008 HC ANESTHESIA 1ST 15 MINUTES: Performed by: INTERNAL MEDICINE

## 2018-12-28 PROCEDURE — G0105 COLORECTAL SCRN; HI RISK IND: HCPCS | Performed by: INTERNAL MEDICINE

## 2018-12-28 RX ORDER — PROPOFOL 10 MG/ML
INJECTION, EMULSION INTRAVENOUS
Status: DISCONTINUED | OUTPATIENT
Start: 2018-12-28 | End: 2018-12-28

## 2018-12-28 RX ORDER — SODIUM CHLORIDE, SODIUM LACTATE, POTASSIUM CHLORIDE, CALCIUM CHLORIDE 600; 310; 30; 20 MG/100ML; MG/100ML; MG/100ML; MG/100ML
INJECTION, SOLUTION INTRAVENOUS CONTINUOUS
Status: DISCONTINUED | OUTPATIENT
Start: 2018-12-28 | End: 2018-12-28 | Stop reason: HOSPADM

## 2018-12-28 RX ORDER — LIDOCAINE HCL/PF 100 MG/5ML
SYRINGE (ML) INTRAVENOUS
Status: DISCONTINUED | OUTPATIENT
Start: 2018-12-28 | End: 2018-12-28

## 2018-12-28 RX ORDER — PANTOPRAZOLE SODIUM 40 MG/1
40 TABLET, DELAYED RELEASE ORAL DAILY
Qty: 30 TABLET | Refills: 5 | Status: SHIPPED | OUTPATIENT
Start: 2018-12-28 | End: 2019-07-05 | Stop reason: SDUPTHER

## 2018-12-28 RX ORDER — SODIUM CHLORIDE 0.9 % (FLUSH) 0.9 %
3 SYRINGE (ML) INJECTION
Status: DISCONTINUED | OUTPATIENT
Start: 2018-12-28 | End: 2018-12-28 | Stop reason: HOSPADM

## 2018-12-28 RX ADMIN — LIDOCAINE HYDROCHLORIDE 100 MG: 20 INJECTION, SOLUTION INTRAVENOUS at 07:12

## 2018-12-28 RX ADMIN — PROPOFOL 40 MG: 10 INJECTION, EMULSION INTRAVENOUS at 07:12

## 2018-12-28 RX ADMIN — SODIUM CHLORIDE, SODIUM LACTATE, POTASSIUM CHLORIDE, AND CALCIUM CHLORIDE: 600; 310; 30; 20 INJECTION, SOLUTION INTRAVENOUS at 06:12

## 2018-12-28 RX ADMIN — PROPOFOL 140 MG: 10 INJECTION, EMULSION INTRAVENOUS at 07:12

## 2018-12-28 RX ADMIN — SODIUM CHLORIDE, SODIUM LACTATE, POTASSIUM CHLORIDE, AND CALCIUM CHLORIDE: 600; 310; 30; 20 INJECTION, SOLUTION INTRAVENOUS at 07:12

## 2018-12-28 NOTE — PROVATION PATIENT INSTRUCTIONS
Discharge Summary/Instructions after an Endoscopic Procedure  Patient Name: Eugenie Bennett  Patient MRN: [id number]  Patient YOB: 1980 Friday, December 28, 2018 Clifton Grande III, MD  RESTRICTIONS:  During your procedure today, you received medications for sedation.  These   medications may affect your judgment, balance and coordination.  Therefore,   for 24 hours, you have the following restrictions:   - DO NOT drive a car, operate machinery, make legal/financial decisions,   sign important papers or drink alcohol.    ACTIVITY:  Today: no heavy lifting, straining or running due to procedural   sedation/anesthesia.  The following day: return to full activity including work.  DIET:  Eat and drink normally unless instructed otherwise.     TREATMENT FOR COMMON SIDE EFFECTS:  - Mild abdominal pain, nausea, belching, bloating or excessive gas:  rest,   eat lightly and use a heating pad.  - Sore Throat: treat with throat lozenges and/or gargle with warm salt   water.  - Because air was used during the procedure, expelling large amounts of air   from your rectum or belching is normal.  - If a bowel prep was taken, you may not have a bowel movement for 1-3 days.    This is normal.  SYMPTOMS TO WATCH FOR AND REPORT TO YOUR PHYSICIAN:  1. Abdominal pain or bloating, other than gas cramps.  2. Chest pain.  3. Back pain.  4. Signs of infection such as: chills or fever occurring within 24 hours   after the procedure.  5. Rectal bleeding, which would show as bright red, maroon, or black stools.   (A tablespoon of blood from the rectum is not serious, especially if   hemorrhoids are present.)  6. Vomiting.  7. Weakness or dizziness.  GO DIRECTLY TO THE NEAREST EMERGENCY ROOM IF YOU HAVE ANY OF THE FOLLOWING:      Difficulty breathing              Chills and/or fever over 101 F   Persistent vomiting and/or vomiting blood   Severe abdominal pain   Severe chest pain   Black, tarry stools   Bleeding- more than one  tablespoon   Any other symptom or condition that you feel may need urgent attention  Your doctor recommends these additional instructions:  If any biopsies were taken, your doctors clinic will contact you in 1 to 2   weeks with any results.  - Discharge patient to home (via wheelchair).   - Resume previous diet.   - Continue present medications.   - Await pathology results.   - Return to GI clinic in 2 weeks.  For questions, problems or results please call your physician Clifton Grande III, MD at Work:  [phone]  If you have any questions about the above instructions, call the GI   department at [phone] or call the endoscopy unit at [phone]   from 7am until 3 pm.  [facility], EMERGENCY ROOM PHONE NUMBER:   [phone]  IF A COMPLICATION OR EMERGENCY SITUATION ARISES AND YOU ARE UNABLE TO REACH   YOUR PHYSICIAN - GO DIRECTLY TO THE EMERGENCY ROOM.  I have read or have had read to me these discharge instructions for my   procedure and have received a written copy.  I understand these   instructions and will follow-up with my physician if I have any questions.     __________________________________       _____________________________________  Nurse Signature                                          Patient/Designated   Responsible Party Signature  Clifton Grande III, MD  12/28/2018 7:42:39 AM  This report has been verified and signed electronically.  [employer]

## 2018-12-28 NOTE — ANESTHESIA RELEASE NOTE
"Anesthesia Release from PACU Note    Patient: Eugenie Bennett    Procedure(s) Performed: Procedure(s) (LRB):  EGD (ESOPHAGOGASTRODUODENOSCOPY) (N/A)  COLONOSCOPY (N/A)    Anesthesia type: MAC    Post pain: Adequate analgesia    Post assessment: no apparent anesthetic complications, tolerated procedure well and no evidence of recall    Last Vitals:   Visit Vitals  /62 (BP Location: Right leg, Patient Position: Lying)   Pulse 93   Temp 36.7 °C (98.1 °F) (Oral)   Resp 12   Ht 6' 2.75" (1.899 m)   Wt 95.7 kg (211 lb)   SpO2 96%   BMI 26.55 kg/m²       Post vital signs: stable    Level of consciousness: awake and alert     Nausea/Vomiting: no nausea/no vomiting    Complications: none    Airway Patency: patent    Respiratory: unassisted, spontaneous ventilation, room air    Cardiovascular: stable    Hydration: euvolemic  "

## 2018-12-28 NOTE — ANESTHESIA POSTPROCEDURE EVALUATION
"Anesthesia Post Evaluation    Patient: Eugenie Bennett    Procedure(s) Performed: Procedure(s) (LRB):  EGD (ESOPHAGOGASTRODUODENOSCOPY) (N/A)  COLONOSCOPY (N/A)    Final Anesthesia Type: MAC  Patient location during evaluation: PACU  Patient participation: Yes- Able to Participate  Level of consciousness: awake and alert and oriented  Post-procedure vital signs: reviewed and stable  Pain management: adequate  Airway patency: patent  PONV status at discharge: No PONV  Anesthetic complications: no      Cardiovascular status: blood pressure returned to baseline  Respiratory status: unassisted, spontaneous ventilation and room air  Hydration status: euvolemic  Follow-up not needed.        Visit Vitals  /62 (BP Location: Right leg, Patient Position: Lying)   Pulse 93   Temp 36.7 °C (98.1 °F) (Oral)   Resp 12   Ht 6' 2.75" (1.899 m)   Wt 95.7 kg (211 lb)   SpO2 96%   BMI 26.55 kg/m²       Pain/Krystal Score: No Data Recorded      "

## 2018-12-28 NOTE — OR NURSING
+++++    EGD    1. Antrum Bx R/O H Pylori.  2. Esophagus Bx .   Patient tolerated procedure well.      Colonoscopy    Hemorrhoids noted Patient tolerated procedure well.

## 2018-12-28 NOTE — ANESTHESIA PREPROCEDURE EVALUATION
12/28/2018  Eugenie Bennett is a 38 y.o., male.    Pre-op Assessment    I have reviewed the Patient Summary Reports.     I have reviewed the Nursing Notes.   I have reviewed the Medications.     Review of Systems  Anesthesia Hx:  No problems with previous Anesthesia    Social:  Former Smoker, Alcohol Use Chew tobacco   Hematology/Oncology:  Hematology Normal   Oncology Normal     Pulmonary:   Snore   Hepatic/GI:   Bowel Prep. GERD, poorly controlled 0230 last drink of fluid.  Wednesday last meal.   Musculoskeletal:  Musculoskeletal Normal    Neurological:  Neurology Normal    Dermatological:  Skin Normal    Psych:  Psychiatric Normal           Physical Exam  General:  Well nourished    Airway/Jaw/Neck:  Airway Findings: Mallampati: II                Anesthesia Plan  Type of Anesthesia, risks & benefits discussed:  Anesthesia Type:  MAC  Patient's Preference:   Intra-op Monitoring Plan:   Intra-op Monitoring Plan Comments:   Post Op Pain Control Plan:   Post Op Pain Control Plan Comments:   Induction:   IV  Beta Blocker:  Patient is not currently on a Beta-Blocker (No further documentation required).       Informed Consent: Patient understands risks and agrees with Anesthesia plan.  Questions answered. Anesthesia consent signed with patient.  ASA Score: 2     Day of Surgery Review of History & Physical: I have interviewed and examined the patient. I have reviewed the patient's H&P dated: 12/28/18. There are no significant changes.  H&P update referred to the surgeon.

## 2018-12-28 NOTE — INTERVAL H&P NOTE
The patient has been examined and the H&P has been reviewed:I have reviewed this note and I agree with this assessment. The patient was seen in the GI office and remains stable for endoscopy at the time of this present evaluation.         Anesthesia/Surgery risks, benefits and alternative options discussed and understood by patient/family.          Active Hospital Problems    Diagnosis  POA    FH: colon cancer in relative <50 years old [Z80.0]  Not Applicable      Resolved Hospital Problems   No resolved problems to display.

## 2018-12-28 NOTE — PROVATION PATIENT INSTRUCTIONS
Discharge Summary/Instructions after an Endoscopic Procedure  Patient Name: Eugenie Bennett  Patient MRN: [id number]  Patient YOB: 1980 Friday, December 28, 2018 Clifton Grande III, MD  RESTRICTIONS:  During your procedure today, you received medications for sedation.  These   medications may affect your judgment, balance and coordination.  Therefore,   for 24 hours, you have the following restrictions:   - DO NOT drive a car, operate machinery, make legal/financial decisions,   sign important papers or drink alcohol.    ACTIVITY:  Today: no heavy lifting, straining or running due to procedural   sedation/anesthesia.  The following day: return to full activity including work.  DIET:  Eat and drink normally unless instructed otherwise.     TREATMENT FOR COMMON SIDE EFFECTS:  - Mild abdominal pain, nausea, belching, bloating or excessive gas:  rest,   eat lightly and use a heating pad.  - Sore Throat: treat with throat lozenges and/or gargle with warm salt   water.  - Because air was used during the procedure, expelling large amounts of air   from your rectum or belching is normal.  - If a bowel prep was taken, you may not have a bowel movement for 1-3 days.    This is normal.  SYMPTOMS TO WATCH FOR AND REPORT TO YOUR PHYSICIAN:  1. Abdominal pain or bloating, other than gas cramps.  2. Chest pain.  3. Back pain.  4. Signs of infection such as: chills or fever occurring within 24 hours   after the procedure.  5. Rectal bleeding, which would show as bright red, maroon, or black stools.   (A tablespoon of blood from the rectum is not serious, especially if   hemorrhoids are present.)  6. Vomiting.  7. Weakness or dizziness.  GO DIRECTLY TO THE NEAREST EMERGENCY ROOM IF YOU HAVE ANY OF THE FOLLOWING:      Difficulty breathing              Chills and/or fever over 101 F   Persistent vomiting and/or vomiting blood   Severe abdominal pain   Severe chest pain   Black, tarry stools   Bleeding- more than one  tablespoon   Any other symptom or condition that you feel may need urgent attention  Your doctor recommends these additional instructions:  If any biopsies were taken, your doctors clinic will contact you in 1 to 2   weeks with any results.  - Discharge patient to home (via wheelchair).   - High fiber diet.   - Continue present medications.   - Repeat colonoscopy in 5 years for screening purposes.   - Return to primary care physician as previously scheduled.   - Discharge patient to home (via wheelchair).   - High fiber diet.   - Continue present medications.   - Repeat colonoscopy in 5 years for screening purposes.   - Return to primary care physician as previously scheduled.  For questions, problems or results please call your physician Clifton Grande III, MD at Work:  [phone]  If you have any questions about the above instructions, call the GI   department at [phone] or call the endoscopy unit at [phone]   from 7am until 3 pm.  [facility], EMERGENCY ROOM PHONE NUMBER:   [phone]  IF A COMPLICATION OR EMERGENCY SITUATION ARISES AND YOU ARE UNABLE TO REACH   YOUR PHYSICIAN - GO DIRECTLY TO THE EMERGENCY ROOM.  I have read or have had read to me these discharge instructions for my   procedure and have received a written copy.  I understand these   instructions and will follow-up with my physician if I have any questions.     __________________________________       _____________________________________  Nurse Signature                                          Patient/Designated   Responsible Party Signature  Clifton Grande III, MD  12/28/2018 7:35:30 AM  This report has been verified and signed electronically.  [employer]ATION

## 2018-12-28 NOTE — DISCHARGE SUMMARY
[facility]  Brief Operative Note     SUMMARY     Surgery Date: 12/28/2018     Surgeon(s) and Role:     * Clifton Grande III, MD - Primary    Assisting Surgeon: None    Pre-op Diagnosis:  Gastroesophageal reflux disease, esophagitis presence not specified [K21.9]  Abdominal bloating [R14.0]  FH: colon cancer in relative <50 years old [Z80.0]    Post-op Diagnosis:  Post-Op Diagnosis Codes:     * Gastroesophageal reflux disease, esophagitis presence not specified [K21.9]     * Abdominal bloating [R14.0]     * FH: colon cancer in relative <50 years old [Z80.0]     - Reflux Esophagitis  Procedure(s) (LRB):  EGD (ESOPHAGOGASTRODUODENOSCOPY) (N/A)  COLONOSCOPY (N/A)    Anesthesia: Monitor Anesthesia Care    Description of the findings of the procedure: Procedures completed. See Procedure note for full details.    Findings/Key Components: Procedures completed. See Procedure note for full details.    Prosthetics/Devices: None    Estimated Blood Loss: * No values recorded between 12/28/2018 12:00 AM and 12/28/2018  7:26 AM *         Specimens:   Specimen (12h ago, onward)    Start     Ordered    12/28/18 0707  Specimen to Pathology - Surgery  Once     Comments:  1. Antrum Bx R/O H Pylori2. Esophagus Bx     Start Status   12/28/18 0707 Collected (12/28/18 0728)       12/28/18 0727          Discharge Note    SUMMARY     Admit Date: 12/28/2018    Discharge Date and Time: 12/28/2018    Hospital Course (synopsis of major diagnoses, care, treatment, and services provided during the course of the hospital stay):  Procedures completed. See Procedure note for full details. Discharge patient when discharge criteria met.    Final Diagnosis: Post-Op Diagnosis Codes:     * Gastroesophageal reflux disease, esophagitis presence not specified [K21.9]     * Abdominal bloating [R14.0]     * FH: colon cancer in relative <50 years old [Z80.0]     - RefluxEsophagitis  Disposition: Discharge patient when discharge criteria  met.    Follow Up/Patient Instructions:       Medications:  Reconciled Home Medications:   Current Discharge Medication List      START taking these medications    Details   pantoprazole (PROTONIX) 40 MG tablet Take 1 tablet (40 mg total) by mouth once daily.  Qty: 30 tablet, Refills: 5         CONTINUE these medications which have NOT CHANGED    Details   dextroamphetamine-amphetamine (ADDERALL) 20 mg tablet Take 3 tablets (60 mg total) by mouth once daily.  Qty: 90 tablet, Refills: 0      levocetirizine (XYZAL) 5 MG tablet Take 1 tablet (5 mg total) by mouth every evening.  Qty: 30 tablet, Refills: 11      meloxicam (MOBIC) 15 MG tablet Take 1 tablet (15 mg total) by mouth daily as needed for Pain.  Qty: 30 tablet, Refills: 0      predniSONE (DELTASONE) 20 MG tablet Take one tablet by mouth three times a day for 3 days.   Then take one tablet by mouth twice a day for 3 days.   Then take one tablet by mouth every morning for 3 days.   Then take 1/2 tablet by mouth every morning for 3 days.  Qty: 20 tablet, Refills: 0         STOP taking these medications       sodium,potassium,mag sulfates (SUPREP BOWEL PREP KIT) 17.5-3.13-1.6 gram SolR Comments:   Reason for Stopping:              Discharge Procedure Orders   Diet general     Activity as tolerated

## 2018-12-28 NOTE — DISCHARGE INSTRUCTIONS
Hemorrhoids    Hemorrhoids are swollen and inflamed veins inside the rectum and near the anus. The rectum is the last several inches of the colon. The anus is the passage between the rectum and the outside of the body.  Causes  The veins can become swollen due to increased pressure in them. This is most often caused by:  · Chronic constipation or diarrhea  · Straining when having a bowel movement  · Sitting too long on the toilet  · A low-fiber diet  · Pregnancy  Symptoms  · Bleeding from the rectum (this may be noticeable after bowel movements)  · Lump near the anus  · Itching around the anus  · Pain around the anus  There are different types of hemorrhoids. Depending on the type you have and the severity, you may be able to treat yourself at home. In some cases, a procedure may be the best treatment option. Your healthcare provider can tell you more about this, if needed.  Home care  General care  · To get relief from pain or itching, try:  ¨ Topical products. Your healthcare provider may prescribe or recommend creams, ointments, or pads that can be applied to the hemorrhoid. Use these exactly as directed.  ¨ Medicines. Your healthcare provider may recommend stool softeners, suppositories, or laxatives to help manage constipation. Use these exactly as directed.  ¨ Sitz baths. A sitz bath involves sitting in a few inches of warm bath water. Be careful not to make the water so hot that you burn yourself--test it before sitting in it. Soak for about 10 to 15 minutes a few times a day. This may help relieve pain.  Tips to help prevent hemorrhoids  · Eat more fiber. Fiber adds bulk to stool and absorbs water as it moves through your colon. This makes stool softer and easier to pass.  ¨ Increase the fiber in your diet with more fiber-rich foods. These include fresh fruit, vegetables, and whole grains.  ¨ Take a fiber supplement or bulking agent, if advised to by your provider. These include products such as psyllium  or methylcellulose.  · Drink plenty of water, if directed to by your provider. This can help keep stool soft.  · Be more active. Frequent exercise aids digestion and helps prevent constipation. It may also help make bowel movements more regular.  · Dont strain during bowel movements. This can make hemorrhoids more likely. Also, dont sit on the toilet for long periods of time.  Follow-up care  Follow up with your healthcare provider, or as advised. If a culture or imaging tests were done, you will be notified of the results when they are ready. This may take a few days or longer.  When to seek medical advice  Call your healthcare provider right away if any of these occur:  · Increased bleeding from the rectum  · Increased pain around the rectum or anus  · Weakness or dizziness  Call 911  Call 911 or return to the emergency department right away if any of these occur:  · Trouble breathing or swallowing  · Fainting or loss of consciousness  · Unusually fast heart rate  · Vomiting blood  · Large amounts of blood in stool  Date Last Reviewed: 6/22/2015 © 2000-2017 [employer]. [address] [place] [address], [place] [address]. All rights reserved. This information is not intended as a substitute for professional medical care. Always follow your healthcare professional's instructions.

## 2019-01-03 ENCOUNTER — OFFICE VISIT (OUTPATIENT)
Dept: OTOLARYNGOLOGY | Facility: CLINIC | Age: 39
End: 2019-01-03
Payer: COMMERCIAL

## 2019-01-03 VITALS
WEIGHT: 221.81 LBS | BODY MASS INDEX: 27.91 KG/M2 | HEART RATE: 100 BPM | SYSTOLIC BLOOD PRESSURE: 123 MMHG | DIASTOLIC BLOOD PRESSURE: 81 MMHG

## 2019-01-03 DIAGNOSIS — J32.9 CHRONIC SINUSITIS, UNSPECIFIED LOCATION: Primary | ICD-10-CM

## 2019-01-03 PROCEDURE — 99999 PR PBB SHADOW E&M-EST. PATIENT-LVL III: ICD-10-PCS | Mod: PBBFAC,,, | Performed by: OTOLARYNGOLOGY

## 2019-01-03 PROCEDURE — 99213 PR OFFICE/OUTPT VISIT, EST, LEVL III, 20-29 MIN: ICD-10-PCS | Mod: 25,S$GLB,, | Performed by: OTOLARYNGOLOGY

## 2019-01-03 PROCEDURE — 31231 PR NASAL ENDOSCOPY, DX: ICD-10-PCS | Mod: S$GLB,,, | Performed by: OTOLARYNGOLOGY

## 2019-01-03 PROCEDURE — 3008F PR BODY MASS INDEX (BMI) DOCUMENTED: ICD-10-PCS | Mod: CPTII,S$GLB,, | Performed by: OTOLARYNGOLOGY

## 2019-01-03 PROCEDURE — 99213 OFFICE O/P EST LOW 20 MIN: CPT | Mod: 25,S$GLB,, | Performed by: OTOLARYNGOLOGY

## 2019-01-03 PROCEDURE — 99999 PR PBB SHADOW E&M-EST. PATIENT-LVL III: CPT | Mod: PBBFAC,,, | Performed by: OTOLARYNGOLOGY

## 2019-01-03 PROCEDURE — 3008F BODY MASS INDEX DOCD: CPT | Mod: CPTII,S$GLB,, | Performed by: OTOLARYNGOLOGY

## 2019-01-03 PROCEDURE — 31231 NASAL ENDOSCOPY DX: CPT | Mod: S$GLB,,, | Performed by: OTOLARYNGOLOGY

## 2019-01-03 RX ORDER — LEVOFLOXACIN 500 MG/1
500 TABLET, FILM COATED ORAL DAILY
Qty: 14 TABLET | Refills: 0 | Status: SHIPPED | OUTPATIENT
Start: 2019-01-03 | End: 2019-01-17

## 2019-01-03 RX ORDER — FLUCONAZOLE 100 MG/1
100 TABLET ORAL DAILY
Qty: 30 TABLET | Refills: 0 | Status: SHIPPED | OUTPATIENT
Start: 2019-01-03 | End: 2019-02-02

## 2019-01-03 NOTE — LETTER
January 3, 2019      [facility]  [address] [place] [address]  Phone: [phone]  Fax: [phone]       Patient: Eugenie Bennett   YOB: 1980  Date of Visit: 01/03/2019    To Whom It May Concern:    Guillermina Bennett  was at [facility] on 01/03/2019. He may return to work on 1/4/19 with no restrictions. If you have any questions or concerns, or if I can be of further assistance, please do not hesitate to contact me.    Sincerely,            Adri Norton LPN

## 2019-01-03 NOTE — PROGRESS NOTES
Subjective:   Patient: Eugenie Bennett [id number], :1980   Visit date:1/3/2019 11:35 AM    Chief Complaint:  Nasal Congestion    HPI:  Eugenie is a 38 y.o. male who is here for follow-up after surgery:    Subjective:   Eugenie is here for chronic nasal drainage.  He reports that he has been on and off of antibiotics throughout the past year.  Most recently, he was treated with 4 weeks of Augmentin between his primary care provider and my physician assistant.  Reports dark, discolored drainage.  He has pressure across the forehead as well as in the cheeks bilaterally. He is using allergy medications with good consistency.  He had been experiencing some discomfort over the nasal bones after we had placed lateral grafts in his surgery but this is resolved.  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    Surgery date: 18    Operations performed: Nasal Septoplasty, SMR turbs, Spirox Latera bilaterally    Pathology:  NA    Cultures:  NA    Review of Systems:  -     Allergic/Immunologic: is allergic to lortab [hydrocodone-acetaminophen]..  -     Constitutional: Current temp:      His meds, allergies, medical, surgical, social & family histories were reviewed & updated:  -     He has a current medication list which includes the following prescription(s): dextroamphetamine-amphetamine, levocetirizine, meloxicam, pantoprazole, fluconazole, and levofloxacin.  -     He  has a past medical history of GERD (gastroesophageal reflux disease).   -     He does not have any pertinent problems on file.   -     He  has a past surgical history that includes Tonsillectomy; Adenoidectomy; Tympanostomy tube placement; nasal septoplasty (N/A, 2018); Cautery of turbinates (Bilateral, 2018); Nasal stenosis repair (Bilateral, 2018); Esophagogastroduodenoscopy (N/A, 2018); and Colonoscopy (N/A, 2018).  -     He  reports that  has never smoked. His smokeless tobacco use  includes chew. He reports that he drinks alcohol. He reports that he does not use drugs.  -     His family history includes Alcohol abuse in his maternal grandfather; Cancer in his maternal grandmother; Colon cancer in his mother.  -     He is allergic to lortab [hydrocodone-acetaminophen].    Objective:     Physical Exam:  Vitals:  /81   Pulse 100   Wt 100.6 kg (221 lb 12.5 oz)   BMI 27.91 kg/m²   General appearance:  Well developed, well nourished    Eyes:  Extraocular motions intact, PERRL    Communication:  no hoarseness, no dysphonia    Ears:  Otoscopy of external auditory canals and tympanic membranes was normal, clinical speech reception thresholds grossly intact, no mass/lesion of auricle.  Nose:  No masses/lesions of external nose, nasal mucosa, septum, and turbinates were within normal limits.  Mouth:  No mass/lesion of lips, teeth, gums, hard/soft palate, tongue, tonsils, or oropharynx.    Cardiovascular:  No pedal edema; Radial Pulses +2     Neck & Lymphatics:  No cervical lymphadenopathy, no neck mass/crepitus/ asymmetry, trachea is midline, no thyroid enlargement/tenderness/mass.    Psych: Oriented x3,  Alert with normal mood and affect.     Respiration/Chest:  Symmetric expansion during respiration, normal respiratory effort.    Skin:  Warm and intact. No ulcerations of face, scalp, neck.        Assessment & Plan:       Septum and turbinates appear without erythema, drainage or breakdown.  Problem appears to be within the sinuses.  He has failed 4 weeks of augmentin.  Recommend levaquin.  Discussed risk of tendonitis and instructed to d/c immediately for worsening or change in pain.  Somewhat difficult as he has a bad rotator cuff tear and has chronic pain there.  We had tried to avoid flouroquinolones due to this but I do not think that we are likely to resolve this without them.  He will call if there is not major improvement by 1 week from now and we will proceed with CT sinus.        Patient: Eugenie Bennett [id number], :1980  Procedure date:1/3/2019  Patient's medications, allergies, past medical, surgical, social and family histories were reviewed and updated as appropriate.  Chief Complaint:  Nasal Congestion    HPI:  Eugenie is a 38 y.o. male with the history of present illness as discussed in the clinic note from today.  Anterior rhinoscopy was insufficient to explain symptoms and findings.     Procedure: Risks, benefits, and alternatives of the procedure were discussed with the patient, and the patient consented to the nasal endoscopy.  The nasal cavity was sprayed with a topical decongestant and anesthetic (if needed). The endoscope was passed into each nostril and each nasal cavity was visualized.  On each side the nasal cavity, sinuses (if open), turbinates, middle and superior meatus, sphenoethmoidal recess and septum were examined with the findings described below. At the end of the examination, the scope was removed. The patient tolerated the procedure well with no complications.       Endoscopic Sinonasal Exam Findings:  -     The right side has normal mucosa  -     The left side has normal mucosa  -     Nasal secretions: No discolored secretions noted bilaterally  -     Nasal septum: septal perforation size = .3 cm and location = central and clean straight, normal mucosa bilaterally  -     Inferior turbinate: Normal mucosa without significant hypertrophy bilaterally  -     Middle turbinate: Normal mucosa without significant hypertrophy bilaterally  -     Other findings: No mass or obstructive lesion    Assessment & Plan:  - see today's clinic note

## 2019-01-06 DIAGNOSIS — B96.81 HELICOBACTER PYLORI GASTRITIS (CHRONIC GASTRITIS): Primary | ICD-10-CM

## 2019-01-06 DIAGNOSIS — K29.50 HELICOBACTER PYLORI GASTRITIS (CHRONIC GASTRITIS): Primary | ICD-10-CM

## 2019-01-06 RX ORDER — BISMUTH SUBCITRATE POTASSIUM, METRONIDAZOLE AND TETRACYCLINE HYDROCHLORIDE 140; 125; 125 MG/1; MG/1; MG/1
3 CAPSULE ORAL
Qty: 120 CAPSULE | Refills: 0 | Status: SHIPPED | OUTPATIENT
Start: 2019-01-06 | End: 2019-01-16

## 2019-01-09 ENCOUNTER — PATIENT MESSAGE (OUTPATIENT)
Dept: OTOLARYNGOLOGY | Facility: CLINIC | Age: 39
End: 2019-01-09

## 2019-01-10 ENCOUNTER — PATIENT MESSAGE (OUTPATIENT)
Dept: OTOLARYNGOLOGY | Facility: CLINIC | Age: 39
End: 2019-01-10

## 2019-01-10 DIAGNOSIS — J32.9 CHRONIC SINUSITIS, UNSPECIFIED LOCATION: Primary | ICD-10-CM

## 2019-01-16 ENCOUNTER — PATIENT MESSAGE (OUTPATIENT)
Dept: GASTROENTEROLOGY | Facility: CLINIC | Age: 39
End: 2019-01-16

## 2019-01-23 ENCOUNTER — HOSPITAL ENCOUNTER (OUTPATIENT)
Dept: RADIOLOGY | Facility: HOSPITAL | Age: 39
Discharge: HOME | End: 2019-01-23
Attending: OTOLARYNGOLOGY
Payer: COMMERCIAL

## 2019-01-23 DIAGNOSIS — J32.9 CHRONIC SINUSITIS, UNSPECIFIED LOCATION: ICD-10-CM

## 2019-01-23 PROCEDURE — 70486 CT MAXILLOFACIAL W/O DYE: CPT | Mod: TC

## 2019-01-24 ENCOUNTER — PATIENT MESSAGE (OUTPATIENT)
Dept: OTOLARYNGOLOGY | Facility: CLINIC | Age: 39
End: 2019-01-24

## 2019-01-25 ENCOUNTER — PATIENT MESSAGE (OUTPATIENT)
Dept: OTOLARYNGOLOGY | Facility: CLINIC | Age: 39
End: 2019-01-25

## 2019-01-25 ENCOUNTER — TELEPHONE (OUTPATIENT)
Dept: OTOLARYNGOLOGY | Facility: CLINIC | Age: 39
End: 2019-01-25

## 2019-01-25 DIAGNOSIS — J30.89 NON-SEASONAL ALLERGIC RHINITIS, UNSPECIFIED TRIGGER: Primary | ICD-10-CM

## 2019-01-25 RX ORDER — DEXTROAMPHETAMINE SACCHARATE, AMPHETAMINE ASPARTATE, DEXTROAMPHETAMINE SULFATE AND AMPHETAMINE SULFATE 5; 5; 5; 5 MG/1; MG/1; MG/1; MG/1
60 TABLET ORAL DAILY
Qty: 90 TABLET | Refills: 0 | Status: SHIPPED | OUTPATIENT
Start: 2019-01-25 | End: 2019-02-25 | Stop reason: SDUPTHER

## 2019-01-25 NOTE — TELEPHONE ENCOUNTER
Thanks, I spoke with Eugenie and we scheduled labs to be done Friday 02/01/2019 anytime at our [facility] also a f/u is scheduled with Dr. Garcia on 2/8/2019 at 1:00pm also at our [facility] location.      ----- Message from Rocky Garcia MD sent at 1/25/2019 11:56 AM CST -----  Please schedule him to have labs drawn then see me 1 week after labs.  Thank you.

## 2019-02-01 ENCOUNTER — LAB VISIT (OUTPATIENT)
Dept: LAB | Facility: HOSPITAL | Age: 39
End: 2019-02-01
Attending: OTOLARYNGOLOGY
Payer: COMMERCIAL

## 2019-02-01 ENCOUNTER — OFFICE VISIT (OUTPATIENT)
Dept: INTERNAL MEDICINE | Facility: CLINIC | Age: 39
End: 2019-02-01
Payer: COMMERCIAL

## 2019-02-01 VITALS
SYSTOLIC BLOOD PRESSURE: 130 MMHG | TEMPERATURE: 96 F | BODY MASS INDEX: 27.63 KG/M2 | HEIGHT: 75 IN | DIASTOLIC BLOOD PRESSURE: 84 MMHG | HEART RATE: 72 BPM | WEIGHT: 222.25 LBS

## 2019-02-01 DIAGNOSIS — G47.00 INSOMNIA, UNSPECIFIED TYPE: ICD-10-CM

## 2019-02-01 DIAGNOSIS — J30.89 NON-SEASONAL ALLERGIC RHINITIS, UNSPECIFIED TRIGGER: ICD-10-CM

## 2019-02-01 DIAGNOSIS — R53.83 FATIGUE, UNSPECIFIED TYPE: Primary | ICD-10-CM

## 2019-02-01 DIAGNOSIS — R61 NIGHT SWEATS: ICD-10-CM

## 2019-02-01 PROCEDURE — 3008F BODY MASS INDEX DOCD: CPT | Mod: CPTII,S$GLB,, | Performed by: FAMILY MEDICINE

## 2019-02-01 PROCEDURE — 99214 PR OFFICE/OUTPT VISIT, EST, LEVL IV, 30-39 MIN: ICD-10-PCS | Mod: S$GLB,,, | Performed by: FAMILY MEDICINE

## 2019-02-01 PROCEDURE — 3008F PR BODY MASS INDEX (BMI) DOCUMENTED: ICD-10-PCS | Mod: CPTII,S$GLB,, | Performed by: FAMILY MEDICINE

## 2019-02-01 PROCEDURE — 86003 ALLG SPEC IGE CRUDE XTRC EA: CPT | Mod: 59

## 2019-02-01 PROCEDURE — 99214 OFFICE O/P EST MOD 30 MIN: CPT | Mod: S$GLB,,, | Performed by: FAMILY MEDICINE

## 2019-02-01 PROCEDURE — 36415 COLL VENOUS BLD VENIPUNCTURE: CPT

## 2019-02-01 PROCEDURE — 99999 PR PBB SHADOW E&M-EST. PATIENT-LVL III: ICD-10-PCS | Mod: PBBFAC,,, | Performed by: FAMILY MEDICINE

## 2019-02-01 PROCEDURE — 86003 ALLG SPEC IGE CRUDE XTRC EA: CPT

## 2019-02-01 PROCEDURE — 99999 PR PBB SHADOW E&M-EST. PATIENT-LVL III: CPT | Mod: PBBFAC,,, | Performed by: FAMILY MEDICINE

## 2019-02-01 RX ORDER — AMITRIPTYLINE HYDROCHLORIDE 25 MG/1
25-50 TABLET, FILM COATED ORAL NIGHTLY PRN
Qty: 45 TABLET | Refills: 1 | Status: SHIPPED | OUTPATIENT
Start: 2019-02-01 | End: 2019-12-09

## 2019-02-03 ENCOUNTER — TELEPHONE (OUTPATIENT)
Dept: INTERNAL MEDICINE | Facility: CLINIC | Age: 39
End: 2019-02-03

## 2019-02-03 NOTE — PROGRESS NOTES
Subjective:      Patient ID: Eugenie Bennett is a 38 y.o. male.    Chief Complaint: Fatigue (mucus gagging him, had sinus surgery and now he feels he can't breathe/hot / cold / hands swollen)    HPI  39 yo male here with his wife today.  Has GERD, on PPI.  Was seen by GI and scoped, +H. Pylori.  Meds sent in were too $$.  They are wanting to know what they need to take.  He has been suffering with sinus issues/nasal issues since his surgery.  Is seeing Dr. Garcia.  Has had 2 rounds of Augmentin as well as 7 day course of Levaquin recently.    He had CT of sinus that looked ok.  He went today and did allergy testing.  He is miserable, can't sleep well at all which is making him more abdul during the day.  He reports having hot flashes day and night and yesterday woke up with swollen hands.  That hadn't occurred before.  Normal today.  Reports he is drinking a lot of water.  Only taking his adderall and the protonix daily at this time.  Stopped all allergy meds.  No fever/chills.    Past Medical History:   Diagnosis Date    GERD (gastroesophageal reflux disease)      Family History   Problem Relation Age of Onset    Colon cancer Mother          at 47    Cancer Maternal Grandmother         unsure of type    Alcohol abuse Maternal Grandfather         Cancer unknown type     Past Surgical History:   Procedure Laterality Date    ADENOIDECTOMY      CAUTERIZATION, MUCOSA OF NASAL TURBINATE Bilateral 2018    Performed by [facility] at [facility] OR    COLONOSCOPY N/A 2018    Performed by Clifton Grande III, MD at [facility] ENDO    EGD (ESOPHAGOGASTRODUODENOSCOPY) N/A 2018    Performed by Clifton Grande III, MD at [facility] ENDO    REPAIR, STENOSIS, NOSE, VESTIBULE Bilateral 2018    Performed by Rocky Garcia [facility] at [facility] OR    SEPTOPLASTY, NASAL N/A 2018    Performed by Rocky [facility] at [facility] OR    TONSILLECTOMY      TYMPANOSTOMY TUBE PLACEMENT      x 2     Social History     Tobacco Use     "Smoking status: Never Smoker    Smokeless tobacco: Current User     Types: Chew    Tobacco comment: Smoker for maybe a year   Substance Use Topics    Alcohol use: Yes     Frequency: 2-3 times a week     Drinks per session: 5 or 6     Binge frequency: Weekly    Drug use: No       /84 (BP Location: Left arm, Patient Position: Sitting, BP Method: X-Large (Manual))   Pulse 72   Temp 96.2 °F (35.7 °C) (Tympanic)   Ht 6' 2.75" (1.899 m)   Wt 100.8 kg (222 lb 3.6 oz)   BMI 27.96 kg/m²     Review of Systems   Constitutional: Positive for activity change and fatigue. Negative for appetite change, chills, diaphoresis, fever and unexpected weight change.   HENT: Positive for congestion. Negative for hearing loss and tinnitus.    Eyes: Negative for visual disturbance.   Respiratory: Negative for cough, chest tightness, shortness of breath and wheezing.    Cardiovascular: Negative for chest pain, palpitations and leg swelling.   Gastrointestinal: Negative for abdominal distention and abdominal pain.   Genitourinary: Negative for difficulty urinating.   Musculoskeletal: Positive for arthralgias.   Neurological: Negative for dizziness, weakness and headaches.   Psychiatric/Behavioral: Positive for agitation, dysphoric mood and sleep disturbance.       Objective:     Physical Exam   Constitutional: He is oriented to person, place, and time. He appears well-developed and well-nourished. No distress.   HENT:   Right Ear: External ear normal.   Left Ear: External ear normal.   Nose: Nose normal.   Mouth/Throat: Oropharynx is clear and moist.   Eyes: Conjunctivae are normal. Pupils are equal, round, and reactive to light.   Neck: Normal range of motion. Neck supple.   Cardiovascular: Normal rate, regular rhythm and normal heart sounds.   Pulmonary/Chest: Effort normal and breath sounds normal. No respiratory distress. He has no wheezes.   Abdominal: Soft. Bowel sounds are normal. He exhibits no distension. There is no " tenderness. There is no guarding.   Musculoskeletal: He exhibits no edema.   Neurological: He is alert and oriented to person, place, and time. No cranial nerve deficit.   Skin: Skin is warm and dry. No rash noted. He is not diaphoretic.   Psychiatric: He has a normal mood and affect. His behavior is normal. Judgment and thought content normal.   Nursing note and vitals reviewed.      Lab Results   Component Value Date    WBC 9.73 02/01/2019    HGB 15.4 02/01/2019    HCT 47.3 02/01/2019     (H) 02/01/2019    CHOL 188 01/26/2018    TRIG 69 01/26/2018    HDL 45 01/26/2018    ALT 24 02/01/2019    AST 23 02/01/2019     02/01/2019    K 3.9 02/01/2019     02/01/2019    CREATININE 1.0 02/01/2019    BUN 10 02/01/2019    CO2 27 02/01/2019    TSH 0.799 02/01/2019       Assessment:     1. Fatigue, unspecified type    2. Night sweats    3. Insomnia, unspecified type         Plan:     Fatigue, unspecified type  -     CBC auto differential; Future; Expected date: 02/01/2019  -     TSH; Future; Expected date: 02/01/2019  -     Comprehensive metabolic panel; Future; Expected date: 02/01/2019    Night sweats  -     CBC auto differential; Future; Expected date: 02/01/2019  -     TSH; Future; Expected date: 02/01/2019  -     Comprehensive metabolic panel; Future; Expected date: 02/01/2019    Insomnia, unspecified type    Other orders  -     amitriptyline (ELAVIL) 25 MG tablet; Take 1-2 tablets (25-50 mg total) by mouth nightly as needed for Insomnia.  Dispense: 45 tablet; Refill: 1    Will update labs, look at CBC/TSH/CMP  Will trial some Elavil for short period to see if he can sleep better.  Cont with ENT for work up  Will send message to GI about replacing the Pylera b/c he didn't get it due to cost  Will review labs and decide on f/u for med check, likely 8-12 wks

## 2019-02-03 NOTE — TELEPHONE ENCOUNTER
Dr. Grande,  I saw Mr. Bennett for a visit today.  Wife mentioned Pylera was sent for him for his +bx/H. Pylori.  They were not able to get due to cost.  They report they contacted your nurse but hadn't heard back about changing regimen.  He is on daily PPI/40mg  Of note, he was on a few round of Augmentin and 7 days of Levaquin recently.

## 2019-02-04 LAB
A ALTERNATA IGE QN: <0.35 KU/L
A FUMIGATUS IGE QN: <0.35 KU/L
ALLERGEN BOXELDER MAPLE TREE IGE: <0.35 KU/L
ALLERGEN MAPLE (BOX ELDER) CLASS: NORMAL
ALLERGEN MULBERRY CLASS: NORMAL
ALLERGEN MULBERRY TREE IGE: <0.35 KU/L
ALLERGEN PIGWEED IGE: <0.35 KU/L
ALLERGEN WHITE ASH TREE IGE: <0.35 KU/L
BALD CYPRESS IGE QN: <0.35 KU/L
BERMUDA GRASS IGE QN: <0.35 KU/L
C HERBARUM IGE QN: <0.35 KU/L
CAT DANDER IGE QN: <0.35 KU/L
CEDAR IGE QN: <0.35 KU/L
COCKLEBUR IGE QN: <0.35 KU/L
COMMON PIGWEED CLASS: NORMAL
COMMON RAGWEED IGE QN: <0.35 KU/L
D FARINAE IGE QN: <0.35 KU/L
D PTERONYSS IGE QN: <0.35 KU/L
DEPRECATED A ALTERNATA IGE RAST QL: NORMAL
DEPRECATED A FUMIGATUS IGE RAST QL: NORMAL
DEPRECATED BALD CYPRESS IGE RAST QL: NORMAL
DEPRECATED BERMUDA GRASS IGE RAST QL: NORMAL
DEPRECATED C HERBARUM IGE RAST QL: NORMAL
DEPRECATED CAT DANDER IGE RAST QL: NORMAL
DEPRECATED CEDAR IGE RAST QL: NORMAL
DEPRECATED COCKLEBUR IGE RAST QL: NORMAL
DEPRECATED COMMON RAGWEED IGE RAST QL: NORMAL
DEPRECATED D FARINAE IGE RAST QL: NORMAL
DEPRECATED D PTERONYSS IGE RAST QL: NORMAL
DEPRECATED DOG DANDER IGE RAST QL: NORMAL
DEPRECATED ELDER IGE RAST QL: NORMAL
DEPRECATED ENGL PLANTAIN IGE RAST QL: NORMAL
DEPRECATED GOOSEFOOT IGE RAST QL: NORMAL
DEPRECATED JOHNSON GRASS IGE RAST QL: NORMAL
DEPRECATED KENT BLUE GRASS IGE RAST QL: NORMAL
DEPRECATED M RACEMOSUS IGE RAST QL: NORMAL
DEPRECATED MUGWORT IGE RAST QL: NORMAL
DEPRECATED NETTLE IGE RAST QL: NORMAL
DEPRECATED P NOTATUM IGE RAST QL: NORMAL
DEPRECATED PECAN/HICK TREE IGE RAST QL: NORMAL
DEPRECATED ROACH IGE RAST QL: NORMAL
DEPRECATED SHEEP SORREL IGE RAST QL: NORMAL
DEPRECATED SILVER BIRCH IGE RAST QL: NORMAL
DEPRECATED TIMOTHY IGE RAST QL: NORMAL
DEPRECATED WHITE OAK IGE RAST QL: NORMAL
DOG DANDER IGE QN: <0.35 KU/L
ELDER IGE QN: <0.35 KU/L
ELM CEDAR CLASS: NORMAL
ELM CEDAR, IGE: <0.35 KU/L
ENGL PLANTAIN IGE QN: <0.35 KU/L
GOOSEFOOT IGE QN: <0.35 KU/L
JOHNSON GRASS IGE QN: <0.35 KU/L
KENT BLUE GRASS IGE QN: <0.35 KU/L
M RACEMOSUS IGE QN: <0.35 KU/L
MUGWORT IGE QN: <0.35 KU/L
NETTLE IGE QN: <0.35 KU/L
P NOTATUM IGE QN: <0.35 KU/L
PECAN/HICK TREE IGE QN: <0.35 KU/L
ROACH IGE QN: <0.35 KU/L
SHEEP SORREL IGE QN: <0.35 KU/L
SILVER BIRCH IGE QN: <0.35 KU/L
TIMOTHY IGE QN: <0.35 KU/L
WHITE ASH CLASS: NORMAL
WHITE OAK IGE QN: <0.35 KU/L

## 2019-02-05 LAB
AMER SYCAMORE IGE QN: <0.35 KU/L
FEATHER PANEL #2: <0.35 KU/L

## 2019-02-08 DIAGNOSIS — K29.50 HELICOBACTER PYLORI GASTRITIS (CHRONIC GASTRITIS): Primary | ICD-10-CM

## 2019-02-08 DIAGNOSIS — B96.81 HELICOBACTER PYLORI GASTRITIS (CHRONIC GASTRITIS): Primary | ICD-10-CM

## 2019-02-08 RX ORDER — METRONIDAZOLE 500 MG/1
500 TABLET ORAL EVERY 8 HOURS
Qty: 30 TABLET | Refills: 0 | Status: SHIPPED | OUTPATIENT
Start: 2019-02-08 | End: 2019-02-18

## 2019-02-08 RX ORDER — TETRACYCLINE HYDROCHLORIDE 500 MG/1
500 CAPSULE ORAL 2 TIMES DAILY
Qty: 20 CAPSULE | Refills: 0 | Status: SHIPPED | OUTPATIENT
Start: 2019-02-08 | End: 2019-02-18

## 2019-02-11 ENCOUNTER — OFFICE VISIT (OUTPATIENT)
Dept: URGENT CARE | Facility: CLINIC | Age: 39
End: 2019-02-11
Payer: COMMERCIAL

## 2019-02-11 VITALS
HEART RATE: 63 BPM | RESPIRATION RATE: 16 BRPM | BODY MASS INDEX: 24.67 KG/M2 | WEIGHT: 198.44 LBS | TEMPERATURE: 98 F | HEIGHT: 75 IN | OXYGEN SATURATION: 98 %

## 2019-02-11 DIAGNOSIS — J06.9 UPPER RESPIRATORY TRACT INFECTION, UNSPECIFIED TYPE: Primary | ICD-10-CM

## 2019-02-11 LAB
CTP QC/QA: YES
S PYO RRNA THROAT QL PROBE: NEGATIVE

## 2019-02-11 PROCEDURE — 99214 OFFICE O/P EST MOD 30 MIN: CPT | Mod: S$GLB,,, | Performed by: NURSE PRACTITIONER

## 2019-02-11 PROCEDURE — 99999 PR PBB SHADOW E&M-EST. PATIENT-LVL IV: ICD-10-PCS | Mod: PBBFAC,,, | Performed by: NURSE PRACTITIONER

## 2019-02-11 PROCEDURE — 3008F BODY MASS INDEX DOCD: CPT | Mod: CPTII,S$GLB,, | Performed by: NURSE PRACTITIONER

## 2019-02-11 PROCEDURE — 3008F PR BODY MASS INDEX (BMI) DOCUMENTED: ICD-10-PCS | Mod: CPTII,S$GLB,, | Performed by: NURSE PRACTITIONER

## 2019-02-11 PROCEDURE — 99999 PR PBB SHADOW E&M-EST. PATIENT-LVL IV: CPT | Mod: PBBFAC,,, | Performed by: NURSE PRACTITIONER

## 2019-02-11 PROCEDURE — 87880 STREP A ASSAY W/OPTIC: CPT | Mod: QW,S$GLB,, | Performed by: NURSE PRACTITIONER

## 2019-02-11 PROCEDURE — 99214 PR OFFICE/OUTPT VISIT, EST, LEVL IV, 30-39 MIN: ICD-10-PCS | Mod: S$GLB,,, | Performed by: NURSE PRACTITIONER

## 2019-02-11 PROCEDURE — 87081 CULTURE SCREEN ONLY: CPT

## 2019-02-11 PROCEDURE — 87880 POCT RAPID STREP A: ICD-10-PCS | Mod: QW,S$GLB,, | Performed by: NURSE PRACTITIONER

## 2019-02-11 NOTE — LETTER
February 11, 2019      [facility]  [address] [place] [address]  Phone: [phone]  Fax: [phone]       Patient: Eugenie Bennett   YOB: 1980  Date of Visit: 02/11/2019    To Whom It May Concern:    Guillermina Bennett  was at [facility] on 02/11/2019. He may return to work/school on 02/12/2019 with no restrictions. If you have any questions or concerns, or if I can be of further assistance, please do not hesitate to contact me.    Sincerely,    Heidi Verduzco, NP

## 2019-02-11 NOTE — PROGRESS NOTES
Subjective:       Patient ID: Eugenie Bennett is a 38 y.o. male.    Chief Complaint: URI    URI    This is a new problem. The current episode started yesterday. The problem has been unchanged. There has been no fever. Associated symptoms include coughing and rhinorrhea. Pertinent negatives include no congestion, diarrhea, dysuria, ear pain, headaches, joint pain, joint swelling, rash, sinus pain, sore throat, vomiting or wheezing. He has tried nothing for the symptoms.     Review of Systems   Constitutional: Negative for fatigue and fever.   HENT: Positive for postnasal drip and rhinorrhea. Negative for congestion, ear pain, sinus pain and sore throat.    Respiratory: Positive for cough. Negative for shortness of breath, wheezing and stridor.    Gastrointestinal: Negative for diarrhea and vomiting.   Genitourinary: Negative for dysuria.   Musculoskeletal: Negative for joint pain.   Skin: Negative for color change and rash.   Allergic/Immunologic: Negative for environmental allergies.   Neurological: Negative for dizziness and headaches.   Psychiatric/Behavioral: Negative for agitation.       Objective:      Physical Exam   Constitutional: He appears well-developed and well-nourished.   HENT:   Head: Normocephalic.   Right Ear: Tympanic membrane normal.   Left Ear: Tympanic membrane normal.   Nose: Nose normal.   Mouth/Throat: Uvula is midline, oropharynx is clear and moist and mucous membranes are normal.   Cardiovascular: Normal rate and normal heart sounds.   Pulmonary/Chest: Effort normal and breath sounds normal.   Nursing note and vitals reviewed.      Assessment:       1. Upper respiratory tract infection, unspecified type        Plan:          Eugenie was seen today for uri.    Diagnoses and all orders for this visit:    Upper respiratory tract infection, unspecified type  -     POCT Rapid Strep A  -     Strep A culture, throat     Requested strep swab as wife has strep.  Follow prescribed treatment  plan as directed.  Stay hydrated and rest.  Report to ER if symptoms worsen.  Follow up with PCP in 2-3 days or sooner if symptoms do not improve.

## 2019-02-14 LAB — BACTERIA THROAT CULT: NORMAL

## 2019-02-15 ENCOUNTER — OFFICE VISIT (OUTPATIENT)
Dept: OTOLARYNGOLOGY | Facility: CLINIC | Age: 39
End: 2019-02-15
Payer: COMMERCIAL

## 2019-02-15 DIAGNOSIS — J34.89 NASAL DRAINAGE: Primary | ICD-10-CM

## 2019-02-15 PROCEDURE — 99024 PR POST-OP FOLLOW-UP VISIT: ICD-10-PCS | Mod: S$GLB,,, | Performed by: OTOLARYNGOLOGY

## 2019-02-15 PROCEDURE — 99024 POSTOP FOLLOW-UP VISIT: CPT | Mod: S$GLB,,, | Performed by: OTOLARYNGOLOGY

## 2019-02-15 NOTE — PROGRESS NOTES
Subjective:   Patient: Eugenie Bennett [id number], :1980   Visit date:2/15/2019 11:35 AM    Chief Complaint:  Nasal drainage      HPI:  Eugenie is a 38 y.o. male who is here for follow-up after surgery:    Subjective:   Eugenie is here for chronic nasal drainage.  He reports that he has been on and off of antibiotics throughout the past year.  Most recently, he was treated with 4 weeks of Augmentin between his primary care provider and my physician assistant.  Reports dark, discolored drainage.  He has pressure across the forehead as well as in the cheeks bilaterally. He is using allergy medications with good consistency.  He had been experiencing some discomfort over the nasal bones after we had placed lateral grafts in his surgery but this is resolved.  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  CT sinus was normal.  Allergy testing was negative.      Surgery date: 18    Operations performed: Nasal Septoplasty, SMR turbs, Spirox Latera bilaterally    Pathology:  NA    Cultures:  NA    Review of Systems:  -     Allergic/Immunologic: is allergic to lortab [hydrocodone-acetaminophen]..  -     Constitutional: Current temp:      His meds, allergies, medical, surgical, social & family histories were reviewed & updated:  -     He has a current medication list which includes the following prescription(s): amitriptyline, dextroamphetamine-amphetamine, ipratropium, metronidazole, pantoprazole, and tetracycline.  -     He  has a past medical history of GERD (gastroesophageal reflux disease).   -     He does not have any pertinent problems on file.   -     He  has a past surgical history that includes Tonsillectomy; Adenoidectomy; Tympanostomy tube placement; Nasal septoplasty (N/A, 2018); Cautery of turbinates (Bilateral, 2018); Nasal stenosis repair (Bilateral, 2018); Esophagogastroduodenoscopy (N/A, 2018); and Colonoscopy (N/A, 2018).  -     He   reports that  has never smoked. His smokeless tobacco use includes chew. He reports that he drinks alcohol. He reports that he does not use drugs.  -     His family history includes Alcohol abuse in his maternal grandfather; Cancer in his maternal grandmother; Colon cancer in his mother.  -     He is allergic to lortab [hydrocodone-acetaminophen].    Objective:     Physical Exam:  Vitals:  There were no vitals taken for this visit.  General appearance:  Well developed, well nourished    Eyes:  Extraocular motions intact, PERRL    Communication:  no hoarseness, no dysphonia    Ears:  Otoscopy of external auditory canals and tympanic membranes was normal, clinical speech reception thresholds grossly intact, no mass/lesion of auricle.  Nose:  No masses/lesions of external nose, nasal mucosa, septum, and turbinates were within normal limits.  Mouth:  No mass/lesion of lips, teeth, gums, hard/soft palate, tongue, tonsils, or oropharynx.    Cardiovascular:  No pedal edema; Radial Pulses +2     Neck & Lymphatics:  No cervical lymphadenopathy, no neck mass/crepitus/ asymmetry, trachea is midline, no thyroid enlargement/tenderness/mass.    Psych: Oriented x3,  Alert with normal mood and affect.     Respiration/Chest:  Symmetric expansion during respiration, normal respiratory effort.    Skin:  Warm and intact. No ulcerations of face, scalp, neck.        Assessment & Plan:       Septum and turbinates appear without erythema, drainage or breakdown.  CT normal.  Allergy testing negative.  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  Continue saline rinses.  Trial of atrovent.  If persistent, will have him see rhinology.      Patient: Eugenie Bennett [id number], :1980  Procedure date:2/15/2019  Patient's medications, allergies, past medical, surgical, social and family histories were reviewed and updated as appropriate.  Chief Complaint:  No chief complaint on file.    HPI:  Eugenie is a 38 y.o.  male with the history of present illness as discussed in the clinic note from today.  Anterior rhinoscopy was insufficient to explain symptoms and findings.     Procedure: Risks, benefits, and alternatives of the procedure were discussed with the patient, and the patient consented to the nasal endoscopy.  The nasal cavity was sprayed with a topical decongestant and anesthetic (if needed). The endoscope was passed into each nostril and each nasal cavity was visualized.  On each side the nasal cavity, sinuses (if open), turbinates, middle and superior meatus, sphenoethmoidal recess and septum were examined with the findings described below. At the end of the examination, the scope was removed. The patient tolerated the procedure well with no complications.       Endoscopic Sinonasal Exam Findings:  -     The right side has normal mucosa  -     The left side has normal mucosa  -     Nasal secretions: No discolored secretions noted bilaterally  -     Nasal septum: septal perforation size = .3 cm and location = central and clean straight, normal mucosa bilaterally  -     Inferior turbinate: Normal mucosa without significant hypertrophy bilaterally  -     Middle turbinate: Normal mucosa without significant hypertrophy bilaterally  -     Other findings: No mass or obstructive lesion    Assessment & Plan:  - see today's clinic note

## 2019-02-25 RX ORDER — DEXTROAMPHETAMINE SACCHARATE, AMPHETAMINE ASPARTATE, DEXTROAMPHETAMINE SULFATE AND AMPHETAMINE SULFATE 5; 5; 5; 5 MG/1; MG/1; MG/1; MG/1
60 TABLET ORAL DAILY
Qty: 90 TABLET | Refills: 0 | Status: SHIPPED | OUTPATIENT
Start: 2019-02-25 | End: 2019-02-25 | Stop reason: SDUPTHER

## 2019-02-25 RX ORDER — DEXTROAMPHETAMINE SACCHARATE, AMPHETAMINE ASPARTATE, DEXTROAMPHETAMINE SULFATE AND AMPHETAMINE SULFATE 5; 5; 5; 5 MG/1; MG/1; MG/1; MG/1
60 TABLET ORAL DAILY
Qty: 90 TABLET | Refills: 0 | Status: SHIPPED | OUTPATIENT
Start: 2019-02-25 | End: 2019-03-26 | Stop reason: SDUPTHER

## 2019-03-20 ENCOUNTER — PATIENT MESSAGE (OUTPATIENT)
Dept: OTOLARYNGOLOGY | Facility: CLINIC | Age: 39
End: 2019-03-20

## 2019-03-26 ENCOUNTER — TELEPHONE (OUTPATIENT)
Dept: OTOLARYNGOLOGY | Facility: CLINIC | Age: 39
End: 2019-03-26

## 2019-03-26 DIAGNOSIS — R09.81 NASAL CONGESTION: ICD-10-CM

## 2019-03-26 DIAGNOSIS — J30.89 NON-SEASONAL ALLERGIC RHINITIS, UNSPECIFIED TRIGGER: Primary | ICD-10-CM

## 2019-03-26 DIAGNOSIS — J32.9 CHRONIC SINUSITIS, UNSPECIFIED LOCATION: ICD-10-CM

## 2019-03-26 RX ORDER — DEXTROAMPHETAMINE SACCHARATE, AMPHETAMINE ASPARTATE, DEXTROAMPHETAMINE SULFATE AND AMPHETAMINE SULFATE 5; 5; 5; 5 MG/1; MG/1; MG/1; MG/1
60 TABLET ORAL DAILY
Qty: 90 TABLET | Refills: 0 | Status: SHIPPED | OUTPATIENT
Start: 2019-03-26 | End: 2019-04-24 | Stop reason: SDUPTHER

## 2019-03-29 ENCOUNTER — TELEPHONE (OUTPATIENT)
Dept: INTERNAL MEDICINE | Facility: CLINIC | Age: 39
End: 2019-03-29

## 2019-03-29 NOTE — TELEPHONE ENCOUNTER
Called pt and let him know that Dr. Steve said Pt is coming to see me for continued nasal issues//he really needs to contact ENT.  I do not have any more to offer him/do for him.  He said that he tried and they don't call him back, and can he still come in to see her.  I again said that she said that she didn't have anything more to offer for him to do for this.  He said he had some other issues he needed to talk to her about so he wants to keep the appt.  SHERIE

## 2019-03-29 NOTE — TELEPHONE ENCOUNTER
Pt is coming to see me for continued nasal issues//he really needs to contact ENT.  I do not have any more to offer him/do for him.

## 2019-04-02 PROBLEM — J32.2 CHRONIC ETHMOIDAL SINUSITIS: Status: ACTIVE | Noted: 2019-04-02

## 2019-04-02 PROBLEM — J34.89 NASAL OBSTRUCTION: Status: ACTIVE | Noted: 2019-04-02

## 2019-04-02 PROBLEM — J34.89 CONCHA BULLOSA: Status: ACTIVE | Noted: 2019-04-02

## 2019-04-02 PROBLEM — J32.1 CHRONIC FRONTAL SINUSITIS: Status: ACTIVE | Noted: 2019-04-02

## 2019-04-02 PROBLEM — J32.0 CHRONIC MAXILLARY SINUSITIS: Status: ACTIVE | Noted: 2019-04-02

## 2019-04-02 PROBLEM — J32.3 CHRONIC SPHENOIDAL SINUSITIS: Status: ACTIVE | Noted: 2019-04-02

## 2019-04-02 PROBLEM — K21.9 GASTROESOPHAGEAL REFLUX DISEASE WITHOUT ESOPHAGITIS: Status: ACTIVE | Noted: 2019-04-02

## 2019-04-02 PROBLEM — J34.89 NASAL SEPTAL PERFORATION: Status: ACTIVE | Noted: 2019-04-02

## 2019-04-02 PROBLEM — J30.89 NON-SEASONAL ALLERGIC RHINITIS: Status: ACTIVE | Noted: 2019-04-02

## 2019-04-25 RX ORDER — DEXTROAMPHETAMINE SACCHARATE, AMPHETAMINE ASPARTATE, DEXTROAMPHETAMINE SULFATE AND AMPHETAMINE SULFATE 5; 5; 5; 5 MG/1; MG/1; MG/1; MG/1
60 TABLET ORAL DAILY
Qty: 90 TABLET | Refills: 0 | Status: SHIPPED | OUTPATIENT
Start: 2019-04-25 | End: 2019-05-23 | Stop reason: SDUPTHER

## 2019-05-23 RX ORDER — DEXTROAMPHETAMINE SACCHARATE, AMPHETAMINE ASPARTATE, DEXTROAMPHETAMINE SULFATE AND AMPHETAMINE SULFATE 5; 5; 5; 5 MG/1; MG/1; MG/1; MG/1
60 TABLET ORAL DAILY
Qty: 90 TABLET | Refills: 0 | Status: SHIPPED | OUTPATIENT
Start: 2019-05-23 | End: 2019-06-21 | Stop reason: SDUPTHER

## 2019-06-21 RX ORDER — DEXTROAMPHETAMINE SACCHARATE, AMPHETAMINE ASPARTATE, DEXTROAMPHETAMINE SULFATE AND AMPHETAMINE SULFATE 5; 5; 5; 5 MG/1; MG/1; MG/1; MG/1
60 TABLET ORAL DAILY
Qty: 90 TABLET | Refills: 0 | Status: SHIPPED | OUTPATIENT
Start: 2019-06-21 | End: 2019-07-23 | Stop reason: SDUPTHER

## 2019-06-23 RX ORDER — PANTOPRAZOLE SODIUM 40 MG/1
TABLET, DELAYED RELEASE ORAL
Qty: 30 TABLET | Refills: 0 | OUTPATIENT
Start: 2019-06-23

## 2019-07-06 RX ORDER — PANTOPRAZOLE SODIUM 40 MG/1
20 TABLET, DELAYED RELEASE ORAL DAILY
Qty: 30 TABLET | Refills: 5 | OUTPATIENT
Start: 2019-07-06

## 2019-07-06 RX ORDER — PANTOPRAZOLE SODIUM 40 MG/1
40 TABLET, DELAYED RELEASE ORAL DAILY
Qty: 30 TABLET | Refills: 1 | Status: SHIPPED | OUTPATIENT
Start: 2019-07-06 | End: 2019-09-07 | Stop reason: SDUPTHER

## 2019-07-08 ENCOUNTER — PATIENT MESSAGE (OUTPATIENT)
Dept: GASTROENTEROLOGY | Facility: CLINIC | Age: 39
End: 2019-07-08

## 2019-07-23 RX ORDER — DEXTROAMPHETAMINE SACCHARATE, AMPHETAMINE ASPARTATE, DEXTROAMPHETAMINE SULFATE AND AMPHETAMINE SULFATE 5; 5; 5; 5 MG/1; MG/1; MG/1; MG/1
60 TABLET ORAL DAILY
Qty: 90 TABLET | Refills: 0 | Status: SHIPPED | OUTPATIENT
Start: 2019-07-23 | End: 2019-08-21 | Stop reason: SDUPTHER

## 2019-08-22 RX ORDER — DEXTROAMPHETAMINE SACCHARATE, AMPHETAMINE ASPARTATE, DEXTROAMPHETAMINE SULFATE AND AMPHETAMINE SULFATE 5; 5; 5; 5 MG/1; MG/1; MG/1; MG/1
60 TABLET ORAL DAILY
Qty: 90 TABLET | Refills: 0 | Status: SHIPPED | OUTPATIENT
Start: 2019-08-22 | End: 2019-09-19 | Stop reason: SDUPTHER

## 2019-08-23 ENCOUNTER — OFFICE VISIT (OUTPATIENT)
Dept: INTERNAL MEDICINE | Facility: CLINIC | Age: 39
End: 2019-08-23
Payer: COMMERCIAL

## 2019-08-23 VITALS
HEART RATE: 80 BPM | RESPIRATION RATE: 20 BRPM | SYSTOLIC BLOOD PRESSURE: 124 MMHG | TEMPERATURE: 97 F | HEIGHT: 75 IN | BODY MASS INDEX: 27.41 KG/M2 | WEIGHT: 220.44 LBS | DIASTOLIC BLOOD PRESSURE: 86 MMHG

## 2019-08-23 DIAGNOSIS — L30.9 DERMATITIS: Primary | ICD-10-CM

## 2019-08-23 PROCEDURE — 99214 PR OFFICE/OUTPT VISIT, EST, LEVL IV, 30-39 MIN: ICD-10-PCS | Mod: S$GLB,,, | Performed by: NURSE PRACTITIONER

## 2019-08-23 PROCEDURE — 3008F PR BODY MASS INDEX (BMI) DOCUMENTED: ICD-10-PCS | Mod: CPTII,S$GLB,, | Performed by: NURSE PRACTITIONER

## 2019-08-23 PROCEDURE — 99999 PR PBB SHADOW E&M-EST. PATIENT-LVL IV: ICD-10-PCS | Mod: PBBFAC,,, | Performed by: NURSE PRACTITIONER

## 2019-08-23 PROCEDURE — 3008F BODY MASS INDEX DOCD: CPT | Mod: CPTII,S$GLB,, | Performed by: NURSE PRACTITIONER

## 2019-08-23 PROCEDURE — 99999 PR PBB SHADOW E&M-EST. PATIENT-LVL IV: CPT | Mod: PBBFAC,,, | Performed by: NURSE PRACTITIONER

## 2019-08-23 PROCEDURE — 99214 OFFICE O/P EST MOD 30 MIN: CPT | Mod: S$GLB,,, | Performed by: NURSE PRACTITIONER

## 2019-08-23 RX ORDER — PREDNISONE 20 MG/1
20 TABLET ORAL DAILY
Qty: 5 TABLET | Refills: 0 | Status: SHIPPED | OUTPATIENT
Start: 2019-08-23 | End: 2019-09-02

## 2019-08-23 RX ORDER — TRIAMCINOLONE ACETONIDE 1 MG/G
OINTMENT TOPICAL 2 TIMES DAILY
Qty: 80 G | Refills: 0 | Status: SHIPPED | OUTPATIENT
Start: 2019-08-23 | End: 2021-05-06

## 2019-08-23 NOTE — PROGRESS NOTES
"Subjective:       Patient ID: Eugenie Bennett is a 39 y.o. male.    Chief Complaint: Rash (on chest and back)    Rash   This is a new problem. The current episode started 1 to 4 weeks ago. The problem has been waxing and waning since onset. Location: chest and back. The rash is characterized by itchiness, dryness and redness. Associated with: works as a , lots of working in the heat, sweating. Pertinent negatives include no anorexia, congestion, cough, diarrhea, eye pain, facial edema, fatigue, fever, joint pain, nail changes, rhinorrhea, shortness of breath, sore throat or vomiting. Treatments tried: aloe vera. The treatment provided no relief. There is no history of allergies or asthma.       /86   Pulse 80   Temp 96.7 °F (35.9 °C) (Tympanic)   Resp 20   Ht 6' 3" (1.905 m)   Wt 100 kg (220 lb 7.4 oz)   BMI 27.56 kg/m²     Review of Systems   Constitutional: Negative for activity change, appetite change, chills, diaphoresis, fatigue, fever and unexpected weight change.   HENT: Negative for congestion, ear pain, nosebleeds, postnasal drip, rhinorrhea, sinus pressure, sneezing, sore throat and trouble swallowing.    Eyes: Negative for photophobia, pain and visual disturbance.   Respiratory: Negative for apnea, cough, choking, chest tightness, shortness of breath and wheezing.    Cardiovascular: Negative for chest pain, palpitations and leg swelling.   Gastrointestinal: Negative for abdominal pain, anorexia, blood in stool, constipation, diarrhea, nausea and vomiting.   Genitourinary: Negative for decreased urine volume, difficulty urinating, dysuria, hematuria and urgency.   Musculoskeletal: Negative for arthralgias, gait problem, joint pain, joint swelling and myalgias.   Skin: Positive for color change and rash. Negative for nail changes.   Neurological: Negative for dizziness, tremors, seizures, syncope, weakness, light-headedness, numbness and headaches.   Psychiatric/Behavioral: Negative " for agitation, confusion, decreased concentration, hallucinations and sleep disturbance. The patient is not nervous/anxious.        Objective:      Physical Exam   Constitutional: He is oriented to person, place, and time. He appears well-developed and well-nourished. No distress.   HENT:   Head: Normocephalic and atraumatic.   Cardiovascular: Normal rate.   Pulmonary/Chest: Effort normal. No respiratory distress.   Neurological: He is alert and oriented to person, place, and time.   Skin: Skin is warm and dry. Rash noted. He is not diaphoretic.   Pinpoint erythematous papules diffuse to chest and back, skin appears dry and excoriated in some areas   Psychiatric: He has a normal mood and affect. His behavior is normal. Judgment and thought content normal.   Nursing note and vitals reviewed.      Assessment:       1. Dermatitis        Plan:       Eugenie was seen today for rash.    Diagnoses and all orders for this visit:    Dermatitis  -     predniSONE (DELTASONE) 20 MG tablet; Take 1 tablet (20 mg total) by mouth once daily. for 10 days  -     triamcinolone acetonide 0.1% (KENALOG) 0.1 % ointment; Apply topically 2 (two) times daily. for 10 days      Patient Instructions   Cera Ve moisturizing cream in a tub for moisture  Oral steroids  Put steroid ointment on rash and then put cera ve cream on top  Dermatology if not improving

## 2019-08-23 NOTE — PATIENT INSTRUCTIONS
Cera Ve moisturizing cream in a tub for moisture  Oral steroids  Put steroid ointment on rash and then put cera ve cream on top  Dermatology if not improving

## 2019-09-07 RX ORDER — PANTOPRAZOLE SODIUM 40 MG/1
TABLET, DELAYED RELEASE ORAL
Qty: 30 TABLET | Refills: 0 | Status: SHIPPED | OUTPATIENT
Start: 2019-09-07 | End: 2019-10-08 | Stop reason: SDUPTHER

## 2019-09-19 RX ORDER — DEXTROAMPHETAMINE SACCHARATE, AMPHETAMINE ASPARTATE, DEXTROAMPHETAMINE SULFATE AND AMPHETAMINE SULFATE 5; 5; 5; 5 MG/1; MG/1; MG/1; MG/1
60 TABLET ORAL DAILY
Qty: 90 TABLET | Refills: 0 | Status: SHIPPED | OUTPATIENT
Start: 2019-09-19 | End: 2019-10-18 | Stop reason: SDUPTHER

## 2019-10-07 ENCOUNTER — OFFICE VISIT (OUTPATIENT)
Dept: URGENT CARE | Facility: CLINIC | Age: 39
End: 2019-10-07
Payer: COMMERCIAL

## 2019-10-07 VITALS
WEIGHT: 215.19 LBS | HEART RATE: 110 BPM | SYSTOLIC BLOOD PRESSURE: 130 MMHG | TEMPERATURE: 99 F | DIASTOLIC BLOOD PRESSURE: 80 MMHG | BODY MASS INDEX: 26.89 KG/M2 | OXYGEN SATURATION: 98 %

## 2019-10-07 DIAGNOSIS — R11.0 NAUSEA: ICD-10-CM

## 2019-10-07 DIAGNOSIS — K21.9 GASTROESOPHAGEAL REFLUX DISEASE, ESOPHAGITIS PRESENCE NOT SPECIFIED: Primary | ICD-10-CM

## 2019-10-07 PROCEDURE — 99214 PR OFFICE/OUTPT VISIT, EST, LEVL IV, 30-39 MIN: ICD-10-PCS | Mod: S$GLB,,, | Performed by: NURSE PRACTITIONER

## 2019-10-07 PROCEDURE — 3008F BODY MASS INDEX DOCD: CPT | Mod: CPTII,S$GLB,, | Performed by: NURSE PRACTITIONER

## 2019-10-07 PROCEDURE — 99999 PR PBB SHADOW E&M-EST. PATIENT-LVL IV: ICD-10-PCS | Mod: PBBFAC,,, | Performed by: NURSE PRACTITIONER

## 2019-10-07 PROCEDURE — 3008F PR BODY MASS INDEX (BMI) DOCUMENTED: ICD-10-PCS | Mod: CPTII,S$GLB,, | Performed by: NURSE PRACTITIONER

## 2019-10-07 PROCEDURE — 99214 OFFICE O/P EST MOD 30 MIN: CPT | Mod: S$GLB,,, | Performed by: NURSE PRACTITIONER

## 2019-10-07 PROCEDURE — 99999 PR PBB SHADOW E&M-EST. PATIENT-LVL IV: CPT | Mod: PBBFAC,,, | Performed by: NURSE PRACTITIONER

## 2019-10-07 RX ORDER — PHENOBARBITAL WITH BELLADONNA ALKALOIDS 16.2; .1037; .0194; .0065 MG/5ML; MG/5ML; MG/5ML; MG/5ML
10 ELIXIR ORAL
Status: DISCONTINUED | OUTPATIENT
Start: 2019-10-07 | End: 2019-10-07

## 2019-10-07 RX ORDER — LIDOCAINE HYDROCHLORIDE 20 MG/ML
15 SOLUTION OROPHARYNGEAL
Status: COMPLETED | OUTPATIENT
Start: 2019-10-07 | End: 2019-10-07

## 2019-10-07 RX ORDER — LIDOCAINE HYDROCHLORIDE 20 MG/ML
JELLY TOPICAL
Status: DISCONTINUED | OUTPATIENT
Start: 2019-10-07 | End: 2019-10-07

## 2019-10-07 RX ORDER — PANTOPRAZOLE SODIUM 40 MG/1
40 TABLET, DELAYED RELEASE ORAL DAILY
Qty: 7 TABLET | Refills: 0 | Status: SHIPPED | OUTPATIENT
Start: 2019-10-07 | End: 2019-10-08 | Stop reason: SDUPTHER

## 2019-10-07 RX ORDER — ALUMINUM HYDROXIDE, MAGNESIUM HYDROXIDE, AND SIMETHICONE 2400; 240; 2400 MG/30ML; MG/30ML; MG/30ML
30 SUSPENSION ORAL
Status: COMPLETED | OUTPATIENT
Start: 2019-10-07 | End: 2019-10-07

## 2019-10-07 RX ORDER — ONDANSETRON 4 MG/1
4 TABLET, FILM COATED ORAL 2 TIMES DAILY
Qty: 10 TABLET | Refills: 0 | Status: SHIPPED | OUTPATIENT
Start: 2019-10-07 | End: 2019-10-12

## 2019-10-07 RX ORDER — DICYCLOMINE HYDROCHLORIDE 10 MG/5ML
20 SOLUTION ORAL
Status: COMPLETED | OUTPATIENT
Start: 2019-10-07 | End: 2019-10-07

## 2019-10-07 RX ADMIN — LIDOCAINE HYDROCHLORIDE 15 ML: 20 SOLUTION OROPHARYNGEAL at 02:10

## 2019-10-07 RX ADMIN — ALUMINUM HYDROXIDE, MAGNESIUM HYDROXIDE, AND SIMETHICONE 30 ML: 2400; 240; 2400 SUSPENSION ORAL at 01:10

## 2019-10-07 RX ADMIN — DICYCLOMINE HYDROCHLORIDE 20 MG: 10 SOLUTION ORAL at 01:10

## 2019-10-07 NOTE — PATIENT INSTRUCTIONS
"  GERD (Adult)    The esophagus is a tube that carries food from the mouth to the stomach. A valve at the lower end of the esophagus prevents stomach acid from flowing upward. When this valve doesn't work properly, stomach contents may repeatedly flow back up (reflux) into the esophagus. This is called gastroesophageal reflux disease (GERD). GERD can irritate the esophagus. It can cause problems with swallowing or breathing. In severe cases, GERD can cause recurrent pneumonia or other serious problems.  Symptoms of reflux include burning, pressure or sharp pain in the upper abdomen or mid to lower chest. The pain can spread to the neck, back, or shoulder. There may be belching, an acid taste in the back of the throat, chronic cough, or sore throat or hoarseness. GERD symptoms often occur during the day after a big meal. They can also occur at night when lying down.   Home care  Lifestyle changes can help reduce symptoms. If needed, medicines may be prescribed. Symptoms often improve with treatment, but if treatment is stopped, the symptoms often return after a few months. So most persons with GERD will need to continue treatment.  Lifestyle changes  · Limit or avoid fatty, fried, and spicy foods, as well as coffee, chocolate, mint, and foods with high acid content such as tomatoes and citrus fruit and juices (orange, grapefruit, lemon).  · Dont eat large meals, especially at night. Frequent, smaller meals are best. Do not lie down right after eating. And dont eat anything 3 hours before going to bed.  · Avoid drinking alcohol and smoking. As much as possible, stay away from second hand smoke.  · If you are overweight, losing weight will reduce symptoms.   · Avoid wearing tight clothing around your stomach area.  · If your symptoms occur during sleep, use a foam wedge to elevate your upper body (not just your head.) Or, place 4" blocks under the head of your bed.  Medicines  If needed, medicines can help relieve " the symptoms of GERD and prevent damage to the esophagus. Discuss a medicine plan with your healthcare provider. This may include one or more of the following medicines:  · Antacids to help neutralize the normal acids in your stomach.  · Acid blockers (H2 blockers) to decrease acid production.  · Acid inhibitors (PPIs) to decrease acid production in a different way than the blockers. They may work better, but can take a little longer to take effect.  Take an antacid 30-60 minutes after eating and at bedtime, but not at the same time as an acid blocker.  Try not to take medicines such as ibuprofen and aspirin. If you are taking aspirin for your heart or other medical reasons, talk to your healthcare provider about stopping it.  Follow-up care  Follow up with your healthcare provider or as advised by our staff.  When to seek medical advice  Call your healthcare provider if any of the following occur:  · Stomach pain gets worse or moves to the lower right abdomen (appendix area)  · Chest pain appears or gets worse, or spreads to the back, neck, shoulder, or arm  · Frequent vomiting (cant keep down liquids)  · Blood in the stool or vomit (red or black in color)  · Feeling weak or dizzy  · Fever of 100.4ºF (38ºC) or higher, or as directed by your healthcare provider  Date Last Reviewed: 6/23/2015 © 2000-2017 [employer]. [address] [place] [address], [place] [address]. All rights reserved. This information is not intended as a substitute for professional medical care. Always follow your healthcare professional's instructions.        Lifestyle Changes for Controlling GERD  When you have GERD, stomach acid feels as if its backing up toward your mouth. Whether or not you take medicine to control your GERD, your symptoms can often be improved with lifestyle changes. Talk to your healthcare provider about the following suggestions. These suggestions may help you get relief from your symptoms.      Raise your  head  Reflux is more likely to strike when youre lying down flat, because stomach fluid can flow backward more easily. Raising the head of your bed 4 to 6 inches can help. To do this:  · Slide blocks or books under the legs at the head of your bed. Or, place a wedge under the mattress. Many foam stores can make a suitable wedge for you. The wedge should run from your waist to the top of your head.  · Dont just prop your head on several pillows. This increases pressure on your stomach. It can make GERD worse.  Watch your eating habits  Certain foods may increase the acid in your stomach or relax the lower esophageal sphincter. This makes GERD more likely. Its best to avoid the following if they cause you symptoms:  · Coffee, tea, and carbonated drinks (with and without caffeine)  · Fatty, fried, or spicy food  · Mint, chocolate, onions, and tomatoes  · Peppermint  · Any other foods that seem to irritate your stomach or cause you pain  Relieve the pressure  Tips include the following:  · Eat smaller meals, even if you have to eat more often.  · Dont lie down right after you eat. Wait a few hours for your stomach to empty.  · Avoid tight belts and tight-fitting clothes.  · Lose excess weight.  Tobacco and alcohol  Avoid smoking tobacco and drinking alcohol. They can make GERD symptoms worse.  Date Last Reviewed: 7/1/2016  © 0196-0719 [employer]. [address] [place] [address]. All rights reserved. This information is not intended as a substitute for professional medical care. Always follow your healthcare professional's instructions.        [place] Diet  Your healthcare provider may recommend a bland diet if you have an upset stomach. It consists of foods that are mild and easy to digest. It is better to eat small frequent meals rather than 3 large meals a day.    Beverages  OK: Fruit juices, non-caffeinated teas and coffee, non-carbonated tellez  Avoid: Carbonated beverage, caffeinated tea  "and coffee, all alcoholic beverages  Bread  OK: Refined white, wheat or rye bread, ismael or soda crackers, [place] toast, plain rolls, bagels  Avoid: Whole-grain bread  Cereal  OK: Refined cereals: cooked or ready to eat  Avoid: Whole-grain cereals and granola, or those containing bran, seeds or nuts  Desserts  OK: Peanut butter and all others except those to "avoid"  Avoid: Chocolate, cocoa, coconut, popcorn, nuts, seeds, jam, marmalade  Fruits  OK: Canned, cooked, frozen or fresh fruits without seeds or tough skin  Avoid: Olives, skin and seeds of fruit  Meats  OK: All fresh or preserved meat, fish and fowl  Avoid: Any that are prepared with those spices to "avoid"  Cheese and eggs  OK: Eggs, cottage cheese, cream cheese, other cheeses  Avoid: All cheeses made with those spices to "avoid"  Potatoes and pasta  OK: Potato, rice, macaroni, noodles, spaghetti  Avoid: None  Soups  OK: All soups without heavy seasoning  Avoid: Soups made with those spices to "avoid"  Vegetables  OK: Canned, cooked, fresh or frozen mildly flavored vegetables without seeds, skins or coarse fiber  Avoid: Vegetables prepared with those spices to "avoid"; skin and seeds of vegetables and those with coarse fiber  Spices  OK: Salt, lemon and lime juice, vinegar, all extracts, sara, cinnamon, thyme, mace, allspice, paprika  Avoid: Chili powder, cloves, pepper, seed spices, garlic, gravy pickles, highly seasoned salad dressings  Date Last Reviewed: 11/20/2015  © 2876-1962 [employer]. [address] [place] [address]. All rights reserved. This information is not intended as a substitute for professional medical care. Always follow your healthcare professional's instructions.        "

## 2019-10-07 NOTE — PROGRESS NOTES
Subjective:       Patient ID: Eugenie Bennett is a 39 y.o. male.    Chief Complaint: Emesis    Emesis    This is a chronic (GERD) problem. Episode onset: 4 days. Episode frequency: 1 to 3 times per day. The problem has been unchanged. Emesis appearance: acidy mucus. There has been no fever. Pertinent negatives include no abdominal pain. Associated symptoms comments: Belching, burning liquid. Treatments tried: protonix 40 mg daily, soup. The treatment provided mild relief.     Review of Systems   Gastrointestinal: Positive for vomiting. Negative for abdominal pain.        History of GERD       Objective:      Physical Exam   Constitutional: He is oriented to person, place, and time. He appears well-developed and well-nourished. He is cooperative. No distress.   HENT:   Head: Normocephalic.   Right Ear: Hearing and external ear normal.   Left Ear: Hearing and external ear normal.   Mouth/Throat: Uvula is midline, oropharynx is clear and moist and mucous membranes are normal. No oral lesions. No uvula swelling.   Eyes: Lids are normal.   Neck: Normal range of motion and full passive range of motion without pain. Neck supple. No tracheal tenderness present.   Cardiovascular: Normal rate.   Pulmonary/Chest: Effort normal. No accessory muscle usage. No tachypnea and no bradypnea. No respiratory distress.   Musculoskeletal: Normal range of motion.   Lymphadenopathy:        Head (right side): No submandibular and no tonsillar adenopathy present.        Head (left side): No submandibular and no tonsillar adenopathy present.     He has no cervical adenopathy.   Neurological: He is alert and oriented to person, place, and time.   Skin: Skin is warm, dry and intact. Capillary refill takes less than 2 seconds. No rash noted. He is not diaphoretic. No pallor.   Nursing note and vitals reviewed.      Assessment:       1. Gastroesophageal reflux disease, esophagitis presence not specified    2. Nausea        Plan:        Eugenie was seen today for emesis.    Diagnoses and all orders for this visit:    Gastroesophageal reflux disease, esophagitis presence not specified  -     Discontinue: GI cocktail (mylanta 30 mL, lidocaine 2 % viscous 10 mL, dicyclomine 10 mL) 50 mL  -     Ambulatory referral to Gastroenterology  -     pantoprazole (PROTONIX) 40 MG tablet; Take 1 tablet (40 mg total) by mouth once daily. for 7 days  -     aluminum & magnesium hydroxide-simethicone 400-400-40 mg/5 mL suspension 30 mL  -     Discontinue: lidocaine HCL 2% jelly  -     Discontinue: phenobarb-hyoscy-atropine-scop 16.2-0.1037 -0.0194 mg/5 mL elixir 10 mL  -     dicyclomine 10 mg/5 mL syrup 20 mg  -     Discontinue: lidocaine HCL 2% jelly  -     lidocaine HCl 2% oral solution 15 mL    Nausea  -     ondansetron (ZOFRAN) 4 MG tablet; Take 1 tablet (4 mg total) by mouth 2 (two) times daily. for 5 days    Follow prescribed treatment plan as directed.  Avoid spicy food, greasy food, fried foods, carbinated or beverages with sugar  Stay hydrated and rest.  Follow up with G.I.  Report to ER if symptoms worsen.  Follow up with PCP in 2-3 days or sooner if symptoms do not improve.

## 2019-10-08 RX ORDER — PANTOPRAZOLE SODIUM 40 MG/1
TABLET, DELAYED RELEASE ORAL
Qty: 30 TABLET | Refills: 0 | Status: SHIPPED | OUTPATIENT
Start: 2019-10-08 | End: 2019-12-02 | Stop reason: SDUPTHER

## 2019-10-18 RX ORDER — DEXTROAMPHETAMINE SACCHARATE, AMPHETAMINE ASPARTATE, DEXTROAMPHETAMINE SULFATE AND AMPHETAMINE SULFATE 5; 5; 5; 5 MG/1; MG/1; MG/1; MG/1
60 TABLET ORAL DAILY
Qty: 90 TABLET | Refills: 0 | Status: SHIPPED | OUTPATIENT
Start: 2019-10-18 | End: 2019-11-15 | Stop reason: SDUPTHER

## 2019-11-07 RX ORDER — PANTOPRAZOLE SODIUM 40 MG/1
TABLET, DELAYED RELEASE ORAL
Qty: 30 TABLET | Refills: 0 | OUTPATIENT
Start: 2019-11-07

## 2019-11-15 RX ORDER — DEXTROAMPHETAMINE SACCHARATE, AMPHETAMINE ASPARTATE, DEXTROAMPHETAMINE SULFATE AND AMPHETAMINE SULFATE 5; 5; 5; 5 MG/1; MG/1; MG/1; MG/1
60 TABLET ORAL DAILY
Qty: 90 TABLET | Refills: 0 | Status: SHIPPED | OUTPATIENT
Start: 2019-11-15 | End: 2019-12-12 | Stop reason: SDUPTHER

## 2019-11-28 RX ORDER — PANTOPRAZOLE SODIUM 40 MG/1
TABLET, DELAYED RELEASE ORAL
Qty: 30 TABLET | Refills: 0 | OUTPATIENT
Start: 2019-11-28

## 2019-12-02 RX ORDER — PANTOPRAZOLE SODIUM 40 MG/1
40 TABLET, DELAYED RELEASE ORAL DAILY
Qty: 30 TABLET | Refills: 0 | Status: SHIPPED | OUTPATIENT
Start: 2019-12-02 | End: 2019-12-30

## 2019-12-09 ENCOUNTER — OFFICE VISIT (OUTPATIENT)
Dept: INTERNAL MEDICINE | Facility: CLINIC | Age: 39
End: 2019-12-09
Payer: COMMERCIAL

## 2019-12-09 VITALS
HEIGHT: 75 IN | WEIGHT: 224.44 LBS | HEART RATE: 78 BPM | BODY MASS INDEX: 27.9 KG/M2 | TEMPERATURE: 98 F | SYSTOLIC BLOOD PRESSURE: 120 MMHG | DIASTOLIC BLOOD PRESSURE: 80 MMHG

## 2019-12-09 DIAGNOSIS — F90.2 ATTENTION DEFICIT HYPERACTIVITY DISORDER (ADHD), COMBINED TYPE: ICD-10-CM

## 2019-12-09 DIAGNOSIS — M54.50 ACUTE LEFT-SIDED LOW BACK PAIN WITHOUT SCIATICA: Primary | ICD-10-CM

## 2019-12-09 DIAGNOSIS — R09.82 PND (POST-NASAL DRIP): ICD-10-CM

## 2019-12-09 PROCEDURE — 99999 PR PBB SHADOW E&M-EST. PATIENT-LVL III: CPT | Mod: PBBFAC,,, | Performed by: FAMILY MEDICINE

## 2019-12-09 PROCEDURE — 99999 PR PBB SHADOW E&M-EST. PATIENT-LVL III: ICD-10-PCS | Mod: PBBFAC,,, | Performed by: FAMILY MEDICINE

## 2019-12-09 PROCEDURE — 99214 PR OFFICE/OUTPT VISIT, EST, LEVL IV, 30-39 MIN: ICD-10-PCS | Mod: S$GLB,,, | Performed by: FAMILY MEDICINE

## 2019-12-09 PROCEDURE — 3008F PR BODY MASS INDEX (BMI) DOCUMENTED: ICD-10-PCS | Mod: CPTII,S$GLB,, | Performed by: FAMILY MEDICINE

## 2019-12-09 PROCEDURE — 99214 OFFICE O/P EST MOD 30 MIN: CPT | Mod: S$GLB,,, | Performed by: FAMILY MEDICINE

## 2019-12-09 PROCEDURE — 3008F BODY MASS INDEX DOCD: CPT | Mod: CPTII,S$GLB,, | Performed by: FAMILY MEDICINE

## 2019-12-09 RX ORDER — LORATADINE 10 MG/1
10 TABLET ORAL DAILY
COMMUNITY
Start: 2019-11-22 | End: 2020-01-03

## 2019-12-09 RX ORDER — NAPROXEN 500 MG/1
500 TABLET ORAL 2 TIMES DAILY PRN
Qty: 30 TABLET | Refills: 0 | Status: SHIPPED | OUTPATIENT
Start: 2019-12-09 | End: 2020-01-03

## 2019-12-09 RX ORDER — FLUTICASONE PROPIONATE 50 MCG
2 SPRAY, SUSPENSION (ML) NASAL DAILY
Qty: 16 G | Refills: 0 | Status: SHIPPED | OUTPATIENT
Start: 2019-12-09 | End: 2020-01-12

## 2019-12-09 RX ORDER — MONTELUKAST SODIUM 10 MG/1
10 TABLET ORAL DAILY
COMMUNITY
Start: 2019-11-22 | End: 2020-01-03

## 2019-12-09 RX ORDER — CYCLOBENZAPRINE HCL 10 MG
10 TABLET ORAL 3 TIMES DAILY PRN
COMMUNITY
Start: 2019-11-22 | End: 2021-05-06

## 2019-12-11 NOTE — PROGRESS NOTES
Subjective:      Patient ID: Eugenie Bennett is a 39 y.o. male.    Chief Complaint: Back Pain    HPI  38 yo male here for f/u on ADD.  Doing ok on meds overall.  No SE/problems.  Saw  recently for back pain and congestion.  No trauma/injury to back  Given some muscle relaxer and it has helped some.  If he rests and does nothing, he feels fine.  With activity/movement it starts to become bothersome.  No radiation into buttock or down the leg.  Pain is in the L lower back/butt area.  Present now for a few wks, maybe around 3  No bowel/urinary incontinence.  He also has nasal congestion.  He ended up seeking another ENT outside of [facility] and had surgery.  Doing ok since then, just some recent issues with allergies he thinks.    Past Medical History:   Diagnosis Date    GERD (gastroesophageal reflux disease)      Family History   Problem Relation Age of Onset    Colon cancer Mother          at 47    Cancer Maternal Grandmother         unsure of type    Alcohol abuse Maternal Grandfather         Cancer unknown type     Past Surgical History:   Procedure Laterality Date    ADENOIDECTOMY      CAUTERY OF TURBINATES Bilateral 2018    Procedure: CAUTERIZATION, MUCOSA OF NASAL TURBINATE;  Surgeon: Rocky Garcia MD;  Location: [facility] OR;  Service: ENT;  Laterality: Bilateral;    COLONOSCOPY N/A 2018    Procedure: COLONOSCOPY;  Surgeon: Clifton Grande III, MD;  Location: [facility];  Service: Endoscopy;  Laterality: N/A;    ESOPHAGOGASTRODUODENOSCOPY N/A 2018    Procedure: EGD (ESOPHAGOGASTRODUODENOSCOPY);  Surgeon: Clifton Grande III, MD;  Location: [facility];  Service: Endoscopy;  Laterality: N/A;    NASAL SEPTOPLASTY N/A 2018    Procedure: SEPTOPLASTY, NASAL;  Surgeon: Rocky Garcia MD;  Location: [facility] OR;  Service: ENT;  Laterality: N/A;    NASAL STENOSIS REPAIR Bilateral 2018    Procedure: REPAIR, STENOSIS, NOSE, VESTIBULE;  Surgeon: Rocky Garcia MD;  Location: [facility] OR;  Service: ENT;  " Laterality: Bilateral;    SINUS SURGERY Bilateral 05/15/2019    REPAIR VESTIBULAR STENOSIS; SEPTOPLASTY; SUBMUCOSAL RESECTION- 50; ANTROSTOMY C ROT- 50; ETHMOIDECTOMY: ANTERIOR- 50; TURBINATE FRACTURE- 50.    TONSILLECTOMY      TYMPANOSTOMY TUBE PLACEMENT      x 2     Social History     Tobacco Use    Smoking status: Never Smoker    Smokeless tobacco: Current User     Types: Chew    Tobacco comment: Smoker for maybe a year   Substance Use Topics    Alcohol use: Yes     Frequency: 2-3 times a week     Drinks per session: 5 or 6     Binge frequency: Weekly    Drug use: No       /80 (BP Location: Right arm, Patient Position: Sitting, BP Method: X-Large (Manual))   Pulse 78   Temp 98 °F (36.7 °C) (Oral)   Ht 6' 3" (1.905 m)   Wt 101.8 kg (224 lb 6.9 oz)   BMI 28.05 kg/m²     Review of Systems   Constitutional: Positive for activity change. Negative for chills and fever.   HENT: Positive for postnasal drip and rhinorrhea. Negative for sinus pressure and sinus pain.    Musculoskeletal: Positive for back pain.       Objective:     Physical Exam   Constitutional: He appears well-developed and well-nourished.   HENT:   Right Ear: External ear normal.   Left Ear: External ear normal.   Nose: Nose normal.   Mouth/Throat: Oropharynx is clear and moist. No oropharyngeal exudate.   Eyes: Pupils are equal, round, and reactive to light. Conjunctivae are normal.   Neck: Normal range of motion. Neck supple.   Cardiovascular: Normal rate, regular rhythm and normal heart sounds.   Pulmonary/Chest: Effort normal and breath sounds normal. No stridor. No respiratory distress.   Musculoskeletal:        Lumbar back: He exhibits tenderness. He exhibits normal range of motion.        Back:    Lymphadenopathy:     He has no cervical adenopathy.   Nursing note and vitals reviewed.      Lab Results   Component Value Date    WBC 9.73 02/01/2019    HGB 15.4 02/01/2019    HCT 47.3 02/01/2019     (H) 02/01/2019    CHOL 188 " 01/26/2018    TRIG 69 01/26/2018    HDL 45 01/26/2018    ALT 24 02/01/2019    AST 23 02/01/2019     02/01/2019    K 3.9 02/01/2019     02/01/2019    CREATININE 1.0 02/01/2019    BUN 10 02/01/2019    CO2 27 02/01/2019    TSH 0.799 02/01/2019       Assessment:     1. Acute left-sided low back pain without sciatica    2. PND (post-nasal drip)    3. Attention deficit hyperactivity disorder (ADHD), combined type         Plan:     Acute left-sided low back pain without sciatica    PND (post-nasal drip)    Attention deficit hyperactivity disorder (ADHD), combined type    Other orders  -     fluticasone propionate (FLONASE) 50 mcg/actuation nasal spray; 2 sprays (100 mcg total) by Each Nostril route once daily.  Dispense: 16 g; Refill: 0  -     naproxen (NAPROSYN) 500 MG tablet; Take 1 tablet (500 mg total) by mouth 2 (two) times daily as needed.  Dispense: 30 tablet; Refill: 0    Cont flexeril PRN for back and use some NSAID for short period of time.  Ok to use heat/massage as well  If no improvement in next week or so, let MD know and will recommend PT  Cont ADD med regimen, refill when due  Start flonase daily, use singulair and claritin.  Consider allergy consultation  F/u 3 mos

## 2019-12-12 RX ORDER — DEXTROAMPHETAMINE SACCHARATE, AMPHETAMINE ASPARTATE, DEXTROAMPHETAMINE SULFATE AND AMPHETAMINE SULFATE 5; 5; 5; 5 MG/1; MG/1; MG/1; MG/1
60 TABLET ORAL DAILY
Qty: 90 TABLET | Refills: 0 | Status: SHIPPED | OUTPATIENT
Start: 2019-12-12 | End: 2020-01-10 | Stop reason: SDUPTHER

## 2019-12-18 ENCOUNTER — PATIENT MESSAGE (OUTPATIENT)
Dept: INTERNAL MEDICINE | Facility: CLINIC | Age: 39
End: 2019-12-18

## 2019-12-18 DIAGNOSIS — M54.50 CHRONIC LOW BACK PAIN, UNSPECIFIED BACK PAIN LATERALITY, UNSPECIFIED WHETHER SCIATICA PRESENT: ICD-10-CM

## 2019-12-18 DIAGNOSIS — G89.29 CHRONIC LOW BACK PAIN, UNSPECIFIED BACK PAIN LATERALITY, UNSPECIFIED WHETHER SCIATICA PRESENT: ICD-10-CM

## 2019-12-18 DIAGNOSIS — Z71.89 SEEN BY MARRIAGE GUIDANCE COUNSELOR: Primary | ICD-10-CM

## 2019-12-19 ENCOUNTER — PATIENT MESSAGE (OUTPATIENT)
Dept: INTERNAL MEDICINE | Facility: CLINIC | Age: 39
End: 2019-12-19

## 2019-12-19 NOTE — TELEPHONE ENCOUNTER
Referrals for Psych and PT in  Couldn't find Aliya Savage, only Aliya Sparrow came up for Neuro med

## 2019-12-30 RX ORDER — PANTOPRAZOLE SODIUM 40 MG/1
TABLET, DELAYED RELEASE ORAL
Qty: 30 TABLET | Refills: 0 | Status: SHIPPED | OUTPATIENT
Start: 2019-12-30 | End: 2020-01-03

## 2020-01-03 ENCOUNTER — OFFICE VISIT (OUTPATIENT)
Dept: INTERNAL MEDICINE | Facility: CLINIC | Age: 40
End: 2020-01-03
Payer: COMMERCIAL

## 2020-01-03 VITALS
DIASTOLIC BLOOD PRESSURE: 88 MMHG | WEIGHT: 224 LBS | BODY MASS INDEX: 27.85 KG/M2 | HEART RATE: 82 BPM | TEMPERATURE: 99 F | HEIGHT: 75 IN | SYSTOLIC BLOOD PRESSURE: 124 MMHG

## 2020-01-03 DIAGNOSIS — K58.9 IRRITABLE BOWEL SYNDROME, UNSPECIFIED TYPE: ICD-10-CM

## 2020-01-03 DIAGNOSIS — K21.9 GASTROESOPHAGEAL REFLUX DISEASE, ESOPHAGITIS PRESENCE NOT SPECIFIED: Primary | ICD-10-CM

## 2020-01-03 PROCEDURE — 99999 PR PBB SHADOW E&M-EST. PATIENT-LVL III: ICD-10-PCS | Mod: PBBFAC,,, | Performed by: FAMILY MEDICINE

## 2020-01-03 PROCEDURE — 3008F BODY MASS INDEX DOCD: CPT | Mod: CPTII,S$GLB,, | Performed by: FAMILY MEDICINE

## 2020-01-03 PROCEDURE — 99214 PR OFFICE/OUTPT VISIT, EST, LEVL IV, 30-39 MIN: ICD-10-PCS | Mod: S$GLB,,, | Performed by: FAMILY MEDICINE

## 2020-01-03 PROCEDURE — 99999 PR PBB SHADOW E&M-EST. PATIENT-LVL III: CPT | Mod: PBBFAC,,, | Performed by: FAMILY MEDICINE

## 2020-01-03 PROCEDURE — 3008F PR BODY MASS INDEX (BMI) DOCUMENTED: ICD-10-PCS | Mod: CPTII,S$GLB,, | Performed by: FAMILY MEDICINE

## 2020-01-03 PROCEDURE — 99214 OFFICE O/P EST MOD 30 MIN: CPT | Mod: S$GLB,,, | Performed by: FAMILY MEDICINE

## 2020-01-03 RX ORDER — ESOMEPRAZOLE MAGNESIUM 40 MG/1
40 CAPSULE, DELAYED RELEASE ORAL
Qty: 30 CAPSULE | Refills: 3 | Status: SHIPPED | OUTPATIENT
Start: 2020-01-03 | End: 2020-04-28

## 2020-01-03 NOTE — PROGRESS NOTES
Subjective:      Patient ID: Eugenie Bennett is a 39 y.o. male.    Chief Complaint: Diarrhea and Emesis    HPI  38 yo male here with c/o GERD that is not responding as well to the protonix.  Gets pain in epigastric region off and on and occ will just vomit.  Not having much nausea, will just have urge to vomit and do it.  He bowels range from loose to constipated at times.    He has had these ongoing issues for awhile and saw GI.  Had EGD/colonoscopy done.  Has esophagitis, protonix used to help.  No weight loss  No blood in the stool/melena    Past Medical History:   Diagnosis Date    GERD (gastroesophageal reflux disease)      Family History   Problem Relation Age of Onset    Colon cancer Mother          at 47    Cancer Maternal Grandmother         unsure of type    Alcohol abuse Maternal Grandfather         Cancer unknown type     Past Surgical History:   Procedure Laterality Date    ADENOIDECTOMY      CAUTERY OF TURBINATES Bilateral 2018    Procedure: CAUTERIZATION, MUCOSA OF NASAL TURBINATE;  Surgeon: Rocky Garcia MD;  Location: [facility] OR;  Service: ENT;  Laterality: Bilateral;    COLONOSCOPY N/A 2018    Procedure: COLONOSCOPY;  Surgeon: Clifton Grande III, MD;  Location: [facility];  Service: Endoscopy;  Laterality: N/A;    ESOPHAGOGASTRODUODENOSCOPY N/A 2018    Procedure: EGD (ESOPHAGOGASTRODUODENOSCOPY);  Surgeon: Clifton Grande III, MD;  Location: [facility];  Service: Endoscopy;  Laterality: N/A;    NASAL SEPTOPLASTY N/A 2018    Procedure: SEPTOPLASTY, NASAL;  Surgeon: Rocky Garcia MD;  Location: [facility] OR;  Service: ENT;  Laterality: N/A;    NASAL STENOSIS REPAIR Bilateral 2018    Procedure: REPAIR, STENOSIS, NOSE, VESTIBULE;  Surgeon: Rocky Garcia MD;  Location: [facility] OR;  Service: ENT;  Laterality: Bilateral;    SINUS SURGERY Bilateral 05/15/2019    REPAIR VESTIBULAR STENOSIS; SEPTOPLASTY; SUBMUCOSAL RESECTION- 50; ANTROSTOMY C ROT- 50; ETHMOIDECTOMY: ANTERIOR- 50;  "TURBINATE FRACTURE- 50.    TONSILLECTOMY      TYMPANOSTOMY TUBE PLACEMENT      x 2     Social History     Tobacco Use    Smoking status: Never Smoker    Smokeless tobacco: Current User     Types: Chew    Tobacco comment: Smoker for maybe a year   Substance Use Topics    Alcohol use: Yes     Frequency: 2-3 times a week     Drinks per session: 5 or 6     Binge frequency: Weekly    Drug use: No       /88 (BP Location: Left arm, Patient Position: Sitting, BP Method: X-Large (Manual))   Pulse 82   Temp 98.6 °F (37 °C) (Oral)   Ht 6' 3" (1.905 m)   Wt 101.6 kg (223 lb 15.8 oz)   BMI 28.00 kg/m²     Review of Systems   Gastrointestinal: Positive for abdominal pain, constipation, diarrhea and vomiting. Negative for blood in stool and nausea.       Objective:     Physical Exam   Constitutional: He appears well-developed and well-nourished.   HENT:   Mouth/Throat: Oropharynx is clear and moist.   Cardiovascular: Normal rate, regular rhythm and normal heart sounds.   Pulmonary/Chest: Effort normal and breath sounds normal. No stridor. No respiratory distress. He has no wheezes.   Abdominal: Soft. Bowel sounds are normal. He exhibits no distension. There is no tenderness. There is no guarding.   Nursing note and vitals reviewed.      Lab Results   Component Value Date    WBC 9.73 02/01/2019    HGB 15.4 02/01/2019    HCT 47.3 02/01/2019     (H) 02/01/2019    CHOL 188 01/26/2018    TRIG 69 01/26/2018    HDL 45 01/26/2018    ALT 24 02/01/2019    AST 23 02/01/2019     02/01/2019    K 3.9 02/01/2019     02/01/2019    CREATININE 1.0 02/01/2019    BUN 10 02/01/2019    CO2 27 02/01/2019    TSH 0.799 02/01/2019       Assessment:     1. Gastroesophageal reflux disease, esophagitis presence not specified    2. Irritable bowel syndrome, unspecified type         Plan:     Gastroesophageal reflux disease, esophagitis presence not specified    Irritable bowel syndrome, unspecified type    Other orders  -   "   esomeprazole (NEXIUM) 40 MG capsule; Take 1 capsule (40 mg total) by mouth before breakfast.  Dispense: 30 capsule; Refill: 3    Reviewed EGD/colonoscopy with pt  Have asked pt to see GI again, he doesn't want to  Will stop the protonix and try Nexium 40mg daily.  Can add pepcid at bedtime.  Recommend starting both probiotic and daily fiber supplementation.  Drink more water  Follow bland/anti-GERD diet.  Avoid NSAIDs/alcohol  Note for work today  If problems persist, see GI

## 2020-01-03 NOTE — PATIENT INSTRUCTIONS
Get some over the counter pepcid and take a dose at night.  Get metamucil or benefiber and take the recommended dose every day.  Get some Culturelle probiotics and take every day.    Please give this to your wife to read!!

## 2020-01-10 RX ORDER — DEXTROAMPHETAMINE SACCHARATE, AMPHETAMINE ASPARTATE, DEXTROAMPHETAMINE SULFATE AND AMPHETAMINE SULFATE 5; 5; 5; 5 MG/1; MG/1; MG/1; MG/1
60 TABLET ORAL DAILY
Qty: 90 TABLET | Refills: 0 | Status: SHIPPED | OUTPATIENT
Start: 2020-01-10 | End: 2020-02-10 | Stop reason: SDUPTHER

## 2020-01-10 RX ORDER — DEXTROAMPHETAMINE SACCHARATE, AMPHETAMINE ASPARTATE, DEXTROAMPHETAMINE SULFATE AND AMPHETAMINE SULFATE 5; 5; 5; 5 MG/1; MG/1; MG/1; MG/1
60 TABLET ORAL DAILY
Qty: 90 TABLET | Refills: 0 | OUTPATIENT
Start: 2020-01-10

## 2020-01-12 RX ORDER — FLUTICASONE PROPIONATE 50 MCG
SPRAY, SUSPENSION (ML) NASAL
Qty: 16 G | Refills: 6 | Status: SHIPPED | OUTPATIENT
Start: 2020-01-12 | End: 2020-06-03 | Stop reason: SDUPTHER

## 2020-02-10 RX ORDER — DEXTROAMPHETAMINE SACCHARATE, AMPHETAMINE ASPARTATE, DEXTROAMPHETAMINE SULFATE AND AMPHETAMINE SULFATE 5; 5; 5; 5 MG/1; MG/1; MG/1; MG/1
60 TABLET ORAL DAILY
Qty: 90 TABLET | Refills: 0 | Status: SHIPPED | OUTPATIENT
Start: 2020-02-10 | End: 2020-03-13 | Stop reason: DRUGHIGH

## 2020-03-13 ENCOUNTER — OFFICE VISIT (OUTPATIENT)
Dept: INTERNAL MEDICINE | Facility: CLINIC | Age: 40
End: 2020-03-13
Payer: COMMERCIAL

## 2020-03-13 VITALS
SYSTOLIC BLOOD PRESSURE: 120 MMHG | HEART RATE: 60 BPM | BODY MASS INDEX: 28.12 KG/M2 | TEMPERATURE: 98 F | HEIGHT: 75 IN | WEIGHT: 226.19 LBS | DIASTOLIC BLOOD PRESSURE: 80 MMHG

## 2020-03-13 DIAGNOSIS — Z00.00 ANNUAL PHYSICAL EXAM: Primary | ICD-10-CM

## 2020-03-13 PROCEDURE — 99999 PR PBB SHADOW E&M-EST. PATIENT-LVL III: ICD-10-PCS | Mod: PBBFAC,,, | Performed by: FAMILY MEDICINE

## 2020-03-13 PROCEDURE — 99395 PREV VISIT EST AGE 18-39: CPT | Mod: S$GLB,,, | Performed by: FAMILY MEDICINE

## 2020-03-13 PROCEDURE — 99999 PR PBB SHADOW E&M-EST. PATIENT-LVL III: CPT | Mod: PBBFAC,,, | Performed by: FAMILY MEDICINE

## 2020-03-13 PROCEDURE — 99395 PR PREVENTIVE VISIT,EST,18-39: ICD-10-PCS | Mod: S$GLB,,, | Performed by: FAMILY MEDICINE

## 2020-03-13 RX ORDER — DEXTROAMPHETAMINE SACCHARATE, AMPHETAMINE ASPARTATE, DEXTROAMPHETAMINE SULFATE AND AMPHETAMINE SULFATE 5; 5; 5; 5 MG/1; MG/1; MG/1; MG/1
1 TABLET ORAL DAILY
COMMUNITY
End: 2021-05-06

## 2020-03-13 RX ORDER — DEXTROAMPHETAMINE SACCHARATE, AMPHETAMINE ASPARTATE, DEXTROAMPHETAMINE SULFATE AND AMPHETAMINE SULFATE 7.5; 7.5; 7.5; 7.5 MG/1; MG/1; MG/1; MG/1
3 TABLET ORAL DAILY
COMMUNITY
Start: 2020-03-04 | End: 2021-07-22

## 2020-03-13 NOTE — PROGRESS NOTES
Subjective:      Patient ID: Eugenie Bennett is a 39 y.o. male.    Chief Complaint:  General exam    HPI  40 yo male with chronic sinus/nasal issues (seen several ENTs/several surgeries), GERD and ADD here for fu.  He is seeing Aliya Sparrow at [facility] now, she is managing his Adderall.  On 30mg bid with 20mg dose as well.  Doing good on this regimen.  GERD doing well on the nexium.  Bowels moving good  Declines labs and flu shot    Past Medical History:   Diagnosis Date    ADD (attention deficit disorder)     Dr. Aliya Sparrow    GERD (gastroesophageal reflux disease)      Family History   Problem Relation Age of Onset    Colon cancer Mother          at 47    Cancer Maternal Grandmother         unsure of type    Alcohol abuse Maternal Grandfather         Cancer unknown type     Past Surgical History:   Procedure Laterality Date    ADENOIDECTOMY      CAUTERY OF TURBINATES Bilateral 2018    Procedure: CAUTERIZATION, MUCOSA OF NASAL TURBINATE;  Surgeon: Rocky Garcia MD;  Location: [facility] OR;  Service: ENT;  Laterality: Bilateral;    COLONOSCOPY N/A 2018    Procedure: COLONOSCOPY;  Surgeon: Clifton Grande III, MD;  Location: [facility];  Service: Endoscopy;  Laterality: N/A;    ESOPHAGOGASTRODUODENOSCOPY N/A 2018    Procedure: EGD (ESOPHAGOGASTRODUODENOSCOPY);  Surgeon: Clifton Grande III, MD;  Location: [facility];  Service: Endoscopy;  Laterality: N/A;    NASAL SEPTOPLASTY N/A 2018    Procedure: SEPTOPLASTY, NASAL;  Surgeon: Rocky Garcia MD;  Location: [facility] OR;  Service: ENT;  Laterality: N/A;    NASAL STENOSIS REPAIR Bilateral 2018    Procedure: REPAIR, STENOSIS, NOSE, VESTIBULE;  Surgeon: Rocky Garcia MD;  Location: [facility] OR;  Service: ENT;  Laterality: Bilateral;    SINUS SURGERY Bilateral 05/15/2019    REPAIR VESTIBULAR STENOSIS; SEPTOPLASTY; SUBMUCOSAL RESECTION- 50; ANTROSTOMY C ROT- 50; ETHMOIDECTOMY: ANTERIOR- 50; TURBINATE FRACTURE- 50.    TONSILLECTOMY       "TYMPANOSTOMY TUBE PLACEMENT      x 2     Social History     Tobacco Use    Smoking status: Never Smoker    Smokeless tobacco: Current User     Types: Chew    Tobacco comment: Smoker for maybe a year   Substance Use Topics    Alcohol use: Yes     Frequency: 2-3 times a week     Drinks per session: 5 or 6     Binge frequency: Weekly    Drug use: No       /80 (BP Location: Right arm, Patient Position: Sitting, BP Method: X-Large (Manual))   Pulse 60   Temp 98.3 °F (36.8 °C) (Oral)   Ht 6' 3" (1.905 m)   Wt 102.6 kg (226 lb 3.1 oz)   BMI 28.27 kg/m²     Review of Systems   Constitutional: Negative for activity change, appetite change, chills, diaphoresis, fatigue, fever and unexpected weight change.   HENT: Positive for postnasal drip and rhinorrhea. Negative for hearing loss and tinnitus.    Eyes: Negative for visual disturbance.   Respiratory: Negative for cough, chest tightness, shortness of breath and wheezing.    Cardiovascular: Negative for chest pain, palpitations and leg swelling.   Gastrointestinal: Negative for abdominal distention, abdominal pain, blood in stool, constipation and diarrhea.   Genitourinary: Negative for difficulty urinating and testicular pain.   Musculoskeletal: Negative for arthralgias and back pain.   Neurological: Negative for dizziness, weakness and headaches.       Objective:     Physical Exam   Constitutional: He is oriented to person, place, and time. He appears well-developed and well-nourished. No distress.   HENT:   Right Ear: External ear normal.   Left Ear: External ear normal.   Nose: Nose normal.   Mouth/Throat: Oropharynx is clear and moist.   Eyes: Pupils are equal, round, and reactive to light. Conjunctivae are normal.   Neck: Normal range of motion. Neck supple. No thyromegaly present.   Cardiovascular: Normal rate, regular rhythm and normal heart sounds.   No murmur heard.  Pulmonary/Chest: Effort normal and breath sounds normal. No respiratory distress. He " has no wheezes.   Abdominal: Soft. Bowel sounds are normal. He exhibits no distension. There is no tenderness. There is no guarding.   Musculoskeletal: He exhibits no edema.   Lymphadenopathy:     He has no cervical adenopathy.   Neurological: He is alert and oriented to person, place, and time. No cranial nerve deficit.   Skin: Skin is warm and dry. No rash noted. He is not diaphoretic.   Psychiatric: He has a normal mood and affect. His behavior is normal. Judgment and thought content normal.   Nursing note and vitals reviewed.      Lab Results   Component Value Date    WBC 9.73 02/01/2019    HGB 15.4 02/01/2019    HCT 47.3 02/01/2019     (H) 02/01/2019    CHOL 188 01/26/2018    TRIG 69 01/26/2018    HDL 45 01/26/2018    ALT 24 02/01/2019    AST 23 02/01/2019     02/01/2019    K 3.9 02/01/2019     02/01/2019    CREATININE 1.0 02/01/2019    BUN 10 02/01/2019    CO2 27 02/01/2019    TSH 0.799 02/01/2019       Assessment:     1. Annual physical exam         Plan:     Annual physical exam    Declines labs//prior chemistry/lipids normal  Cont with ADD per [facility]   Cont Nexium  Cont with sinus meds  F/u annually and PRN

## 2020-04-28 ENCOUNTER — OFFICE VISIT (OUTPATIENT)
Dept: INTERNAL MEDICINE | Facility: CLINIC | Age: 40
End: 2020-04-28
Payer: COMMERCIAL

## 2020-04-28 VITALS
HEIGHT: 75 IN | DIASTOLIC BLOOD PRESSURE: 70 MMHG | TEMPERATURE: 97 F | WEIGHT: 221.13 LBS | BODY MASS INDEX: 27.49 KG/M2 | SYSTOLIC BLOOD PRESSURE: 110 MMHG | HEART RATE: 76 BPM

## 2020-04-28 DIAGNOSIS — K21.9 GASTROESOPHAGEAL REFLUX DISEASE, ESOPHAGITIS PRESENCE NOT SPECIFIED: Primary | ICD-10-CM

## 2020-04-28 PROCEDURE — 3008F BODY MASS INDEX DOCD: CPT | Mod: CPTII,S$GLB,, | Performed by: FAMILY MEDICINE

## 2020-04-28 PROCEDURE — 99214 OFFICE O/P EST MOD 30 MIN: CPT | Mod: S$GLB,,, | Performed by: FAMILY MEDICINE

## 2020-04-28 PROCEDURE — 99999 PR PBB SHADOW E&M-EST. PATIENT-LVL III: ICD-10-PCS | Mod: PBBFAC,,, | Performed by: FAMILY MEDICINE

## 2020-04-28 PROCEDURE — 99214 PR OFFICE/OUTPT VISIT, EST, LEVL IV, 30-39 MIN: ICD-10-PCS | Mod: S$GLB,,, | Performed by: FAMILY MEDICINE

## 2020-04-28 PROCEDURE — 3008F PR BODY MASS INDEX (BMI) DOCUMENTED: ICD-10-PCS | Mod: CPTII,S$GLB,, | Performed by: FAMILY MEDICINE

## 2020-04-28 PROCEDURE — 99999 PR PBB SHADOW E&M-EST. PATIENT-LVL III: CPT | Mod: PBBFAC,,, | Performed by: FAMILY MEDICINE

## 2020-04-28 RX ORDER — ESOMEPRAZOLE MAGNESIUM 40 MG/1
40 CAPSULE, DELAYED RELEASE ORAL
Qty: 30 CAPSULE | Refills: 11 | Status: SHIPPED | OUTPATIENT
Start: 2020-04-28 | End: 2021-04-28

## 2020-04-28 RX ORDER — SUCRALFATE 1 G/1
1 TABLET ORAL
Qty: 120 TABLET | Refills: 0 | Status: SHIPPED | OUTPATIENT
Start: 2020-04-28 | End: 2021-05-06

## 2020-04-28 NOTE — PROGRESS NOTES
Subjective:      Patient ID: Eugenie Bennett is a 39 y.o. male.    Chief Complaint: Emesis (gassy)    HPI  38 yo presents today with c/o increased GERD/burping/belching this past week.  Hard to eat, has had some vomiting.  Appetite not the same.  Stools have been loose.  No blood in the stool.  Weight down a few lbs.  Hasn't gone to work past few days.  Stopped his PPI b/c he states it is the generic and it doesn't work.    Past Medical History:   Diagnosis Date    ADD (attention deficit disorder)     Dr. Aliya Sparrow    GERD (gastroesophageal reflux disease)      Family History   Problem Relation Age of Onset    Colon cancer Mother          at 47    Cancer Maternal Grandmother         unsure of type    Alcohol abuse Maternal Grandfather         Cancer unknown type     Past Surgical History:   Procedure Laterality Date    ADENOIDECTOMY      CAUTERY OF TURBINATES Bilateral 2018    Procedure: CAUTERIZATION, MUCOSA OF NASAL TURBINATE;  Surgeon: Rocky Garcia MD;  Location: [facility] OR;  Service: ENT;  Laterality: Bilateral;    COLONOSCOPY N/A 2018    Procedure: COLONOSCOPY;  Surgeon: Clifton Grande III, MD;  Location: [facility];  Service: Endoscopy;  Laterality: N/A;    ESOPHAGOGASTRODUODENOSCOPY N/A 2018    Procedure: EGD (ESOPHAGOGASTRODUODENOSCOPY);  Surgeon: Clifton Grande III, MD;  Location: [facility];  Service: Endoscopy;  Laterality: N/A;    NASAL SEPTOPLASTY N/A 2018    Procedure: SEPTOPLASTY, NASAL;  Surgeon: Rocky Garcia MD;  Location: [facility] OR;  Service: ENT;  Laterality: N/A;    NASAL STENOSIS REPAIR Bilateral 2018    Procedure: REPAIR, STENOSIS, NOSE, VESTIBULE;  Surgeon: Rocky Garcia MD;  Location: [facility] OR;  Service: ENT;  Laterality: Bilateral;    SINUS SURGERY Bilateral 05/15/2019    REPAIR VESTIBULAR STENOSIS; SEPTOPLASTY; SUBMUCOSAL RESECTION- 50; ANTROSTOMY C ROT- 50; ETHMOIDECTOMY: ANTERIOR- 50; TURBINATE FRACTURE- 50.    TONSILLECTOMY      TYMPANOSTOMY TUBE  "PLACEMENT      x 2     Social History     Tobacco Use    Smoking status: Never Smoker    Smokeless tobacco: Current User     Types: Chew    Tobacco comment: Smoker for maybe a year   Substance Use Topics    Alcohol use: Yes     Frequency: 2-3 times a week     Drinks per session: 5 or 6     Binge frequency: Weekly    Drug use: No       /70 (BP Location: Right arm, Patient Position: Sitting, BP Method: X-Large (Manual))   Pulse 76   Temp 97.3 °F (36.3 °C) (Tympanic)   Ht 6' 3" (1.905 m)   Wt 100.3 kg (221 lb 1.9 oz)   BMI 27.64 kg/m²     Review of Systems   Constitutional: Negative for chills and fever.   Respiratory: Negative for cough, chest tightness and shortness of breath.    Cardiovascular: Negative for chest pain.   Gastrointestinal: Positive for abdominal pain, nausea and vomiting. Negative for blood in stool.        Gassy/bad reflux       Objective:     Physical Exam   Constitutional: He is oriented to person, place, and time. He appears well-developed and well-nourished. No distress.   Eyes: Pupils are equal, round, and reactive to light. Conjunctivae are normal.   Neck: Normal range of motion. Neck supple.   Cardiovascular: Normal rate, regular rhythm and normal heart sounds.   Pulmonary/Chest: Effort normal and breath sounds normal. No respiratory distress. He has no wheezes.   Abdominal: Soft. Bowel sounds are normal. He exhibits no distension. There is no tenderness. There is no guarding.   Musculoskeletal: He exhibits no edema.   Neurological: He is alert and oriented to person, place, and time. No cranial nerve deficit.   Skin: Skin is warm and dry. No rash noted. He is not diaphoretic.   Psychiatric: He has a normal mood and affect. His behavior is normal. Judgment and thought content normal.   Nursing note and vitals reviewed.      Lab Results   Component Value Date    WBC 9.73 02/01/2019    HGB 15.4 02/01/2019    HCT 47.3 02/01/2019     (H) 02/01/2019    CHOL 188 01/26/2018    " TRIG 69 01/26/2018    HDL 45 01/26/2018    ALT 24 02/01/2019    AST 23 02/01/2019     02/01/2019    K 3.9 02/01/2019     02/01/2019    CREATININE 1.0 02/01/2019    BUN 10 02/01/2019    CO2 27 02/01/2019    TSH 0.799 02/01/2019       Assessment:     1. Gastroesophageal reflux disease, esophagitis presence not specified         Plan:     Gastroesophageal reflux disease, esophagitis presence not specified    Other orders  -     esomeprazole (NEXIUM) 40 MG capsule; Take 1 capsule (40 mg total) by mouth before breakfast.  Dispense: 30 capsule; Refill: 11  -     sucralfate (CARAFATE) 1 gram tablet; Take 1 tablet (1 g total) by mouth 4 (four) times daily before meals and nightly.  Dispense: 120 tablet; Refill: 0    Start on nexium name brand 40mg daily//can always get OTC and take 2  Will do a month of carafate as well  Anti-GERD diet  F/u PRN  Note to return to work on Thurs

## 2020-06-04 RX ORDER — FLUTICASONE PROPIONATE 50 MCG
2 SPRAY, SUSPENSION (ML) NASAL DAILY
Qty: 16 G | Refills: 6 | Status: SHIPPED | OUTPATIENT
Start: 2020-06-04 | End: 2021-04-07 | Stop reason: SDUPTHER

## 2020-06-24 ENCOUNTER — OFFICE VISIT (OUTPATIENT)
Dept: INTERNAL MEDICINE | Facility: CLINIC | Age: 40
End: 2020-06-24
Payer: COMMERCIAL

## 2020-06-24 VITALS
HEART RATE: 88 BPM | RESPIRATION RATE: 18 BRPM | TEMPERATURE: 99 F | DIASTOLIC BLOOD PRESSURE: 70 MMHG | BODY MASS INDEX: 26.54 KG/M2 | SYSTOLIC BLOOD PRESSURE: 110 MMHG | WEIGHT: 212.31 LBS

## 2020-06-24 DIAGNOSIS — R06.89 DIFFICULTY BREATHING: Primary | ICD-10-CM

## 2020-06-24 PROCEDURE — 99213 PR OFFICE/OUTPT VISIT, EST, LEVL III, 20-29 MIN: ICD-10-PCS | Mod: S$GLB,,, | Performed by: NURSE PRACTITIONER

## 2020-06-24 PROCEDURE — 99999 PR PBB SHADOW E&M-EST. PATIENT-LVL III: ICD-10-PCS | Mod: PBBFAC,,, | Performed by: NURSE PRACTITIONER

## 2020-06-24 PROCEDURE — 3008F BODY MASS INDEX DOCD: CPT | Mod: CPTII,S$GLB,, | Performed by: NURSE PRACTITIONER

## 2020-06-24 PROCEDURE — 99213 OFFICE O/P EST LOW 20 MIN: CPT | Mod: S$GLB,,, | Performed by: NURSE PRACTITIONER

## 2020-06-24 PROCEDURE — 3008F PR BODY MASS INDEX (BMI) DOCUMENTED: ICD-10-PCS | Mod: CPTII,S$GLB,, | Performed by: NURSE PRACTITIONER

## 2020-06-24 PROCEDURE — 99999 PR PBB SHADOW E&M-EST. PATIENT-LVL III: CPT | Mod: PBBFAC,,, | Performed by: NURSE PRACTITIONER

## 2020-06-24 NOTE — PROGRESS NOTES
Subjective:       Patient ID: Eugenie Bennett is a 39 y.o. male.    Chief Complaint: Shortness of Breath    Had nasal surgery by Dr. Tu Noland August 2019  Still can't breathe out of his nose  Has allergy issues, when the weather changes cannot breathe, sometimes has post nasal drip  Has tried multiple allergy meds  Nose is tender to touch   He states that wearing a mask makes it difficult for him to breathe  No current Upper Respiratory Infection symptoms  He continues to say that he does not want to see Dr. Noland again    Shortness of Breath  Pertinent negatives include no chest pain, fever, headaches, leg swelling or rash.       /70 (BP Location: Left arm, Patient Position: Sitting)   Pulse 88   Temp 98.7 °F (37.1 °C) (Oral)   Resp 18   Wt 96.3 kg (212 lb 4.9 oz)   BMI 26.54 kg/m²     Review of Systems   Constitutional: Positive for activity change. Negative for appetite change, chills, diaphoresis, fatigue, fever and unexpected weight change.   HENT: Negative.    Eyes: Negative.    Respiratory: Positive for shortness of breath. Negative for apnea, chest tightness and stridor.    Cardiovascular: Negative for chest pain, palpitations and leg swelling.   Gastrointestinal: Negative.    Endocrine: Negative.    Genitourinary: Negative.    Musculoskeletal: Negative for arthralgias and myalgias.   Skin: Negative for color change, pallor, rash and wound.   Allergic/Immunologic: Negative.    Neurological: Negative for dizziness, facial asymmetry, light-headedness and headaches.   Hematological: Negative for adenopathy.   Psychiatric/Behavioral: Negative for agitation and behavioral problems.       Objective:      Physical Exam  Vitals signs and nursing note reviewed.   Constitutional:       General: He is not in acute distress.     Appearance: He is well-developed. He is not diaphoretic.   HENT:      Head: Normocephalic and atraumatic.      Nose: Nose normal. No nasal tenderness, congestion or  rhinorrhea.   Cardiovascular:      Rate and Rhythm: Normal rate.   Pulmonary:      Effort: Pulmonary effort is normal. No respiratory distress.   Skin:     General: Skin is warm and dry.      Findings: No rash.   Neurological:      Mental Status: He is alert and oriented to person, place, and time.   Psychiatric:         Behavior: Behavior normal.         Thought Content: Thought content normal.         Judgment: Judgment normal.         Assessment:       1. Difficulty breathing        Plan:       Eugenie was seen today for shortness of breath.    Diagnoses and all orders for this visit:    Difficulty breathing    refer to ent  Patient insists he does not want to see Dr. Tu barba again  He requests a note to be off work today (Wednesday through Monday). I do not feel that it is medically necessary to take patient out of work at this time. Ok to give note for him coming to todays visit but an extended work excuse is not medically indicated at this time

## 2020-06-26 ENCOUNTER — OFFICE VISIT (OUTPATIENT)
Dept: OTOLARYNGOLOGY | Facility: CLINIC | Age: 40
End: 2020-06-26
Payer: COMMERCIAL

## 2020-06-26 VITALS
TEMPERATURE: 98 F | WEIGHT: 219.81 LBS | DIASTOLIC BLOOD PRESSURE: 101 MMHG | HEIGHT: 75 IN | BODY MASS INDEX: 27.33 KG/M2 | SYSTOLIC BLOOD PRESSURE: 146 MMHG | HEART RATE: 111 BPM

## 2020-06-26 DIAGNOSIS — R09.81 NASAL CONGESTION: Primary | ICD-10-CM

## 2020-06-26 PROCEDURE — 99214 PR OFFICE/OUTPT VISIT, EST, LEVL IV, 30-39 MIN: ICD-10-PCS | Mod: S$GLB,,, | Performed by: OTOLARYNGOLOGY

## 2020-06-26 PROCEDURE — 3008F PR BODY MASS INDEX (BMI) DOCUMENTED: ICD-10-PCS | Mod: CPTII,S$GLB,, | Performed by: OTOLARYNGOLOGY

## 2020-06-26 PROCEDURE — 3008F BODY MASS INDEX DOCD: CPT | Mod: CPTII,S$GLB,, | Performed by: OTOLARYNGOLOGY

## 2020-06-26 PROCEDURE — 99999 PR PBB SHADOW E&M-EST. PATIENT-LVL III: ICD-10-PCS | Mod: PBBFAC,,, | Performed by: OTOLARYNGOLOGY

## 2020-06-26 PROCEDURE — 99214 OFFICE O/P EST MOD 30 MIN: CPT | Mod: S$GLB,,, | Performed by: OTOLARYNGOLOGY

## 2020-06-26 PROCEDURE — 99999 PR PBB SHADOW E&M-EST. PATIENT-LVL III: CPT | Mod: PBBFAC,,, | Performed by: OTOLARYNGOLOGY

## 2020-06-26 RX ORDER — IPRATROPIUM BROMIDE 42 UG/1
2 SPRAY, METERED NASAL 3 TIMES DAILY
Qty: 15 ML | Refills: 6 | Status: SHIPPED | OUTPATIENT
Start: 2020-06-26 | End: 2021-07-02

## 2020-06-26 NOTE — LETTER
June 26, 2020      [facility]  [address]  [place] [address]  Phone: [phone]  Fax: [phone]       Patient: Eugenie Bennett   YOB: 1980  Date of Visit: 06/26/2020    To Whom It May Concern:    Guillermina Bennett  was at [facility] on 06/26/2020. HE may return to work with no restrictions. If you have any questions or concerns, or if I can be of further assistance, please do not hesitate to contact me.    Sincerely,    Magali Cohen LPN

## 2020-07-09 ENCOUNTER — TELEPHONE (OUTPATIENT)
Dept: INTERNAL MEDICINE | Facility: CLINIC | Age: 40
End: 2020-07-09

## 2020-07-09 NOTE — TELEPHONE ENCOUNTER
----- Message from Debbie Brown sent at 7/9/2020 11:42 AM CDT -----  Contact: Dr.Jessica Sparrow  Wants to know if there was any further work up on the pt shortness of breath / chronic sinus issues, no additional info given and can be reached at [phone] fax [phone]///[email or web address]

## 2020-07-09 NOTE — TELEPHONE ENCOUNTER
Left message informing Aliya with  Dr. Sparrow's office that I will fax notes from pt's ENT appt. If anything further is needed let us know.

## 2020-07-09 NOTE — PROGRESS NOTES
"Subjective:   Patient: Eugenie Bennett [id number], :1980   Visit date:2020 10:33 PM    Chief Complaint:  Other (sinus issues pt states that with the warmer weather he still cant breath even after all the sinus sx)    HPI:  Eugenie is a 39 y.o. male who is here for follow-up.     1. Hx of septoplasty, SMR IT, revision septo/smr turbs/summer bullosa resection/anterior FESS/lateral crural turn-in grafts  2. C/o difficulty breathing through the nose, feels SOB  3. Does not feel that air does not flow through the nose but feels like he has difficulty getting enough oxygen  4. Worse with heat and exertion  5. He does feel like he has watery runny nose posteriorly     Review of Systems:  -     Allergic/Immunologic: is allergic to ceftin [cefuroxime axetil]; fluconazole; and lortab [hydrocodone-acetaminophen]..  -     Constitutional: Current temp: 98.3 °F (36.8 °C) (Tympanic)    -     He has a current medication list which includes the following prescription(s): cyclobenzaprine, dextroamphetamine-amphetamine, esomeprazole, fluticasone propionate, sod chlor,bicarb/squeez bottle, sucralfate, dextroamphetamine-amphetamine, ipratropium, and triamcinolone acetonide 0.1%.  Objective:     Physical Exam:  Vitals:  BP (!) 146/101   Pulse (!) 111   Temp 98.3 °F (36.8 °C) (Tympanic)   Ht 6' 3" (1.905 m)   Wt 99.7 kg (219 lb 12.8 oz)   BMI 27.47 kg/m²   Appearance:  Well-developed, well-nourished.  Communication:  Able to communicate, no hoarseness.  Head & Face:  Normocephalic, atraumatic, no sinus tenderness, normal facial strength.  Eyes:  Extraocular motions intact.  Ears:  Otoscopy of external auditory canals and tympanic membranes was normal, clinical speech reception thresholds grossly intact, no mass/lesion of auricle.  Nose:  No masses/lesions of external nose, nasal mucosa, septum, and turbinates were within normal limits.  Mouth:  No mass/lesion of lips, teeth, gums, hard/soft palate, tongue, tonsils, " or oropharynx.  Neck & Lymphatics:  No cervical lymphadenopathy, no neck mass/crepitus/ asymmetry, trachea is midline, no thyroid enlargement/tenderness/mass.  Neuro/Psych: Alert with normal mood and affect.   Abdominal: Normal appearance.   Respiration/Chest:  Symmetric expansion during respiration, normal respiratory effort.  Skin:  Warm and intact  Cardiovascular:  No peripheral vascular edema or varicosities.    Assessment & Plan:   Eugenie was seen today for other.    Diagnoses and all orders for this visit:    Nasal congestion    Other orders  -     ipratropium (ATROVENT) 42 mcg (0.06 %) nasal spray; 2 sprays by Nasal route 3 (three) times daily.      Exam shows widely patent nasal passages.  Recommend trial of atrovent for rhinorrhea. PRN.

## 2020-10-21 ENCOUNTER — PATIENT MESSAGE (OUTPATIENT)
Dept: INTERNAL MEDICINE | Facility: CLINIC | Age: 40
End: 2020-10-21

## 2020-10-21 ENCOUNTER — PATIENT MESSAGE (OUTPATIENT)
Dept: ADMINISTRATIVE | Facility: OTHER | Age: 40
End: 2020-10-21

## 2020-10-30 ENCOUNTER — OFFICE VISIT (OUTPATIENT)
Dept: PODIATRY | Facility: CLINIC | Age: 40
End: 2020-10-30
Payer: COMMERCIAL

## 2020-10-30 DIAGNOSIS — M77.41 METATARSALGIA OF BOTH FEET: ICD-10-CM

## 2020-10-30 DIAGNOSIS — M20.42 ACQUIRED HAMMERTOES OF BOTH FEET: Primary | ICD-10-CM

## 2020-10-30 DIAGNOSIS — M77.42 METATARSALGIA OF BOTH FEET: ICD-10-CM

## 2020-10-30 DIAGNOSIS — M20.41 ACQUIRED HAMMERTOES OF BOTH FEET: Primary | ICD-10-CM

## 2020-10-30 PROCEDURE — 99999 PR PBB SHADOW E&M-EST. PATIENT-LVL II: CPT | Mod: PBBFAC,,, | Performed by: PODIATRIST

## 2020-10-30 PROCEDURE — 99243 OFF/OP CNSLTJ NEW/EST LOW 30: CPT | Mod: S$GLB,,, | Performed by: PODIATRIST

## 2020-10-30 PROCEDURE — 99243 PR OFFICE CONSULTATION,LEVEL III: ICD-10-PCS | Mod: S$GLB,,, | Performed by: PODIATRIST

## 2020-10-30 PROCEDURE — 99999 PR PBB SHADOW E&M-EST. PATIENT-LVL II: ICD-10-PCS | Mod: PBBFAC,,, | Performed by: PODIATRIST

## 2020-10-30 NOTE — LETTER
October 30, 2020      Bebo Steve MD  [address] [place] [place] [address]           [facility]  [address] [place] [address]  Phone: [phone]          Patient: Eugenie Bennett   MR Number: [id number]   YOB: 1980   Date of Visit: 10/30/2020       Dear Dr. Bebo Steve:    Thank you for referring Eugenie Bennett to me for evaluation. Attached you will find relevant portions of my assessment and plan of care.    If you have questions, please do not hesitate to call me. I look forward to following Eugenie Bennett along with you.    Sincerely,    Karen Heaton, ERICKA    Enclosure  CC:  No Recipients    If you would like to receive this communication electronically, please contact [email or web address] or [phone] to request more information on [employer] Mobile [employer] Link access.    For providers and/or their staff who would like to refer a patient to [facility], please contact us through our one-stop-shop provider referral line, [facility], at [phone].    If you feel you have received this communication in error or would no longer like to receive these types of communications, please e-mail [email or web address]

## 2020-10-30 NOTE — PROGRESS NOTES
Subjective:       Patient ID: Eugenie Bennett is a 40 y.o. male.    Chief Complaint: Foot Pain (Bilateral plantar foot pain, denies pain at present. Patient states pain has been present for 2+ years. Nothing specific makes it better or worse. Patient is non diabetic. )      HPI: Eugenie Bennett presents to the office today, with complaints of pains to the left and right foot.  Reports 0/10 pain at present.  States he has been dealing with intermittent pain for approximately 2 years.  States that he does nothing to specifically make this discomfort better or worse.  Reports pain is to the ball of his foot.  Does work with steel toe boots. The patient does have hammer toe contractures. States alternation of shoe gear has not been helpful. Patient's Primary Care Provider is Bebo Steve MD.     Review of patient's allergies indicates:   Allergen Reactions    Ceftin [cefuroxime axetil] Nausea Only    Fluconazole Other (See Comments)    Lortab [hydrocodone-acetaminophen] Nausea Only       Past Medical History:   Diagnosis Date    ADD (attention deficit disorder)     Dr. Aliya Sparrow    GERD (gastroesophageal reflux disease)        Family History   Problem Relation Age of Onset    Colon cancer Mother          at 47    Cancer Maternal Grandmother         unsure of type    Alcohol abuse Maternal Grandfather         Cancer unknown type       Social History     Socioeconomic History    Marital status:      Spouse name: Not on file    Number of children: Not on file    Years of education: Not on file    Highest education level: Not on file   Occupational History    Occupation: [employer]   Social Needs    Financial resource strain: Not on file    Food insecurity     Worry: Not on file     Inability: Not on file    Transportation needs     Medical: Not on file     Non-medical: Not on file   Tobacco Use    Smoking status: Never Smoker    Smokeless tobacco: Current User      Types: Chew    Tobacco comment: Smoker for maybe a year   Substance and Sexual Activity    Alcohol use: Yes     Frequency: 2-3 times a week     Drinks per session: 5 or 6     Binge frequency: Weekly    Drug use: No    Sexual activity: Yes     Partners: Female   Lifestyle    Physical activity     Days per week: Not on file     Minutes per session: Not on file    Stress: Not on file   Relationships    Social connections     Talks on phone: Not on file     Gets together: Not on file     Attends [employer] service: Not on file     Active member of club or organization: Not on file     Attends meetings of clubs or organizations: Not on file     Relationship status: Not on file   Other Topics Concern    Not on file   Social History Narrative    Not on file       Past Surgical History:   Procedure Laterality Date    ADENOIDECTOMY      CAUTERY OF TURBINATES Bilateral 8/8/2018    Procedure: CAUTERIZATION, MUCOSA OF NASAL TURBINATE;  Surgeon: Rocky Garcia MD;  Location: [facility];  Service: ENT;  Laterality: Bilateral;    COLONOSCOPY N/A 12/28/2018    Procedure: COLONOSCOPY;  Surgeon: Clifton Grande III, MD;  Location: [facility];  Service: Endoscopy;  Laterality: N/A;    ESOPHAGOGASTRODUODENOSCOPY N/A 12/28/2018    Procedure: EGD (ESOPHAGOGASTRODUODENOSCOPY);  Surgeon: Clifton Grande III, MD;  Location: [facility];  Service: Endoscopy;  Laterality: N/A;    NASAL SEPTOPLASTY N/A 8/8/2018    Procedure: SEPTOPLASTY, NASAL;  Surgeon: Rocky Garcia MD;  Location: [facility];  Service: ENT;  Laterality: N/A;    NASAL STENOSIS REPAIR Bilateral 8/8/2018    Procedure: REPAIR, STENOSIS, NOSE, VESTIBULE;  Surgeon: Rocky Garcia MD;  Location: [facility];  Service: ENT;  Laterality: Bilateral;    SINUS SURGERY Bilateral 05/15/2019    REPAIR VESTIBULAR STENOSIS; SEPTOPLASTY; SUBMUCOSAL RESECTION- 50; ANTROSTOMY C ROT- 50; ETHMOIDECTOMY: ANTERIOR- 50; TURBINATE FRACTURE- 50.    TONSILLECTOMY      TYMPANOSTOMY TUBE PLACEMENT      x 2        Review of Systems   Constitutional: Negative for activity change, appetite change, chills and fever.   HENT: Negative for sinus pain, sore throat and voice change.    Eyes: Negative for pain, redness and visual disturbance.   Respiratory: Negative for cough and shortness of breath.    Cardiovascular: Negative for chest pain and palpitations.   Gastrointestinal: Negative for diarrhea, nausea and vomiting.   Musculoskeletal: Negative for back pain and joint swelling.   Skin: Negative for color change and wound.   Neurological: Negative for dizziness, weakness and numbness.   Psychiatric/Behavioral: The patient is nervous/anxious.          Objective:   There were no vitals taken for this visit.    CT [employer] Sinuses without  Narrative: EXAMINATION:  CT MEDTR[employer]IC SINUSES WITHOUT    CLINICAL HISTORY:  Sinusitis, recurring, possible surgery;  Chronic sinusitis, unspecified    TECHNIQUE:  2.5 mm axial images of the paranasal sinuses without contrast.  Coronal and sagittal reformatted imaging from the axial acquisition    COMPARISON:  None    FINDINGS:  Frontal sinuses: Frontal sinuses are clear.    The ethmoid air cells are clear bilaterally.    Sphenoid sinus:Clear.    Maxillary antra: 7 mm polyp or retention cyst within the inferior left maxillary sinus and superior anterior right maxillary sinus.  Sinuses are otherwise clear.    Ostiomeatal complexes: The ostiomeatal complexes are widely patent bilaterally.    There is mild leftward deviation of the nasal septum.    Roof of the ethmoids is symmetric.  The lamina papyracea grossly intact bilaterally.  Impression: See findings above.    All CT scans at this facility use dose modulation, iterative reconstruction, and/or weight based dosing when appropriate to reduce radiation dose to as low as reasonable achievable.    Electronically signed by: Thanh Albert MD  Date:    01/23/2019  Time:    10:58      Physical Exam    LOWER EXTREMITY PHYSICAL  EXAMINATION    DERMATOLOGY: Skin is supple, dry and intact.  There is flaky tissue noted to the plantar surface of the ball of the right left foot.  No definite callus formation.  No ecchymosis is noted. No hypertrophic skin formation. No erythema or cellulitis is noted.     VASCULAR: The right dorsalis pedis pulse is 2/4 and the posterior tibial pulse is 2/4. The left dorsalis pedis pulse is 2/4 and the posterior tibial pulse is 2/4. Hair growth is noted on the dorsal foot and digits. Proximal to distal, warm to warm. Capillary refill time is WNL at less than 3s.    NEUROLOGY: Protective sensation is intact via 5.07 [place] monofilament. Proprioception is intact. Sensation to light touch is intact. Upon palpation of the interspaces, there are no neurological sensations stated that radiate proximal or distal. Upon compression of the metatarsal heads from medial to lateral, no neurological sensations or symptoms are stated.    ORTHOPEDIC: Manual Muscle Testing is 5/5 in all planes on the right and left foot, without pains, with and without resistance. Gait pattern is antalgic. There is mild discomfort to palpation at the left plantar 4th MTPJ. There is mild discomfort to palpation at the right plantar 4th MTPJ. Plantar fat pad atrophy with displacement is noted.  Gastrocsoleal equinus contracture is noted.  Cavus foot type is noted. Negative Lachman's testing is noted.  Semi reducible hammertoe is noted to the 2nd, 3rd, 4th digits bilaterally.    Assessment:     1. Acquired hammertoes of both feet    2. Metatarsalgia of both feet        Plan:     Acquired hammertoes of both feet    Metatarsalgia of both feet      Thorough discussion is had with the patient this afternoon, concerning the diagnosis, its etiology, and the treatment algorithm at present.  Discussed that with patient's current foot structure, he has subsequently developed a hammertoe deformity to the 2nd, 3rd, 4th digit.  Subsequently, he is  getting retrograde buckling of his metatarsal heads which is making the more prominent to the plantar aspect of the foot.  This is causing the body to develop potentially a callus formation to this area.  The callus formation is not there today as patient states he has recently remove this.  Did discussed with patient the importance of elevation of his metatarsal heads associated with metatarsal padding.  This will padding can be purchased over-the-counter.  Encourage him to purchase additional metatarsal padding to prevent the metatarsal head from subsequently hitting the plantar surface of the right and left foot.    Patient may also benefit from 40% urea cream purchased on [employer] to help hydrate and removed the flaking tissue to the plantar aspect the right left foot.        No future appointments.

## 2020-10-30 NOTE — LETTER
October 30, 2020      [facility]  [address] [place] [address]  Phone: [phone]       Patient: Eugenie Bennett   YOB: 1980  Date of Visit: 10/30/2020    To Whom It May Concern:    Guillermina Bennett  was at [facility] on 10/30/2020. Please excuse from work on 10/30/20. He may return to work/school on 11/2/20 with no restrictions. If you have any questions or concerns, or if I can be of further assistance, please do not hesitate to contact me.    Sincerely,      Karen Heaton DPM

## 2021-04-07 ENCOUNTER — OFFICE VISIT (OUTPATIENT)
Dept: INTERNAL MEDICINE | Facility: CLINIC | Age: 41
End: 2021-04-07
Payer: COMMERCIAL

## 2021-04-07 DIAGNOSIS — R05.9 COUGH: ICD-10-CM

## 2021-04-07 DIAGNOSIS — J32.4 CHRONIC PANSINUSITIS: Primary | ICD-10-CM

## 2021-04-07 DIAGNOSIS — F90.9 ATTENTION DEFICIT HYPERACTIVITY DISORDER (ADHD), UNSPECIFIED ADHD TYPE: ICD-10-CM

## 2021-04-07 DIAGNOSIS — K21.9 GASTROESOPHAGEAL REFLUX DISEASE, UNSPECIFIED WHETHER ESOPHAGITIS PRESENT: ICD-10-CM

## 2021-04-07 PROCEDURE — 99214 PR OFFICE/OUTPT VISIT, EST, LEVL IV, 30-39 MIN: ICD-10-PCS | Mod: 95,,, | Performed by: NURSE PRACTITIONER

## 2021-04-07 PROCEDURE — 99214 OFFICE O/P EST MOD 30 MIN: CPT | Mod: 95,,, | Performed by: NURSE PRACTITIONER

## 2021-04-07 RX ORDER — AMOXICILLIN AND CLAVULANATE POTASSIUM 875; 125 MG/1; MG/1
1 TABLET, FILM COATED ORAL 2 TIMES DAILY
Qty: 20 TABLET | Refills: 0 | Status: SHIPPED | OUTPATIENT
Start: 2021-04-07 | End: 2021-04-17

## 2021-04-07 RX ORDER — FLUTICASONE PROPIONATE 50 MCG
2 SPRAY, SUSPENSION (ML) NASAL DAILY
Qty: 16 G | Refills: 6 | Status: SHIPPED | OUTPATIENT
Start: 2021-04-07 | End: 2021-07-02

## 2021-04-09 ENCOUNTER — LAB VISIT (OUTPATIENT)
Dept: INTERNAL MEDICINE | Facility: CLINIC | Age: 41
End: 2021-04-09
Payer: COMMERCIAL

## 2021-04-09 PROCEDURE — U0005 INFEC AGEN DETEC AMPLI PROBE: HCPCS | Performed by: NURSE PRACTITIONER

## 2021-04-09 PROCEDURE — U0003 INFECTIOUS AGENT DETECTION BY NUCLEIC ACID (DNA OR RNA); SEVERE ACUTE RESPIRATORY SYNDROME CORONAVIRUS 2 (SARS-COV-2) (CORONAVIRUS DISEASE [COVID-19]), AMPLIFIED PROBE TECHNIQUE, MAKING USE OF HIGH THROUGHPUT TECHNOLOGIES AS DESCRIBED BY CMS-2020-01-R: HCPCS | Performed by: NURSE PRACTITIONER

## 2021-04-10 LAB — SARS-COV-2 RNA RESP QL NAA+PROBE: NOT DETECTED

## 2021-04-29 ENCOUNTER — PATIENT MESSAGE (OUTPATIENT)
Dept: INTERNAL MEDICINE | Facility: CLINIC | Age: 41
End: 2021-04-29

## 2021-05-14 ENCOUNTER — OFFICE VISIT (OUTPATIENT)
Dept: INTERNAL MEDICINE | Facility: CLINIC | Age: 41
End: 2021-05-14
Payer: COMMERCIAL

## 2021-05-14 VITALS
DIASTOLIC BLOOD PRESSURE: 74 MMHG | SYSTOLIC BLOOD PRESSURE: 118 MMHG | TEMPERATURE: 99 F | WEIGHT: 211 LBS | HEIGHT: 75 IN | HEART RATE: 94 BPM | BODY MASS INDEX: 26.24 KG/M2

## 2021-05-14 DIAGNOSIS — M77.8 ELBOW TENDONITIS: Primary | ICD-10-CM

## 2021-05-14 DIAGNOSIS — J32.4 CHRONIC PANSINUSITIS: ICD-10-CM

## 2021-05-14 PROCEDURE — 1126F AMNT PAIN NOTED NONE PRSNT: CPT | Mod: S$GLB,,, | Performed by: FAMILY MEDICINE

## 2021-05-14 PROCEDURE — 99999 PR PBB SHADOW E&M-EST. PATIENT-LVL IV: CPT | Mod: PBBFAC,,, | Performed by: FAMILY MEDICINE

## 2021-05-14 PROCEDURE — 99999 PR PBB SHADOW E&M-EST. PATIENT-LVL IV: ICD-10-PCS | Mod: PBBFAC,,, | Performed by: FAMILY MEDICINE

## 2021-05-14 PROCEDURE — 1126F PR PAIN SEVERITY QUANTIFIED, NO PAIN PRESENT: ICD-10-PCS | Mod: S$GLB,,, | Performed by: FAMILY MEDICINE

## 2021-05-14 PROCEDURE — 3008F BODY MASS INDEX DOCD: CPT | Mod: CPTII,S$GLB,, | Performed by: FAMILY MEDICINE

## 2021-05-14 PROCEDURE — 99214 PR OFFICE/OUTPT VISIT, EST, LEVL IV, 30-39 MIN: ICD-10-PCS | Mod: S$GLB,,, | Performed by: FAMILY MEDICINE

## 2021-05-14 PROCEDURE — 3008F PR BODY MASS INDEX (BMI) DOCUMENTED: ICD-10-PCS | Mod: CPTII,S$GLB,, | Performed by: FAMILY MEDICINE

## 2021-05-14 PROCEDURE — 99214 OFFICE O/P EST MOD 30 MIN: CPT | Mod: S$GLB,,, | Performed by: FAMILY MEDICINE

## 2021-05-14 RX ORDER — METHYLPREDNISOLONE 4 MG/1
TABLET ORAL
Qty: 1 PACKAGE | Refills: 0 | Status: SHIPPED | OUTPATIENT
Start: 2021-05-14 | End: 2021-06-04

## 2021-06-14 ENCOUNTER — HOSPITAL ENCOUNTER (OUTPATIENT)
Dept: RADIOLOGY | Facility: HOSPITAL | Age: 41
Discharge: HOME | End: 2021-06-14
Attending: SINGLE SPECIALTY
Payer: COMMERCIAL

## 2021-06-14 PROCEDURE — 71046 XR CHEST PA AND LATERAL: ICD-10-PCS | Mod: 26,,, | Performed by: RADIOLOGY

## 2021-06-14 PROCEDURE — 71046 X-RAY EXAM CHEST 2 VIEWS: CPT | Mod: 26,,, | Performed by: RADIOLOGY

## 2021-06-14 PROCEDURE — 71046 X-RAY EXAM CHEST 2 VIEWS: CPT | Mod: TC,FY,PO

## 2022-04-27 ENCOUNTER — PATIENT MESSAGE (OUTPATIENT)
Dept: ADMINISTRATIVE | Facility: HOSPITAL | Age: 42
End: 2022-04-27
Payer: COMMERCIAL

## 2022-11-29 ENCOUNTER — OFFICE VISIT (OUTPATIENT)
Dept: INTERNAL MEDICINE | Facility: CLINIC | Age: 42
End: 2022-11-29
Payer: COMMERCIAL

## 2022-11-29 VITALS
HEART RATE: 92 BPM | BODY MASS INDEX: 30.7 KG/M2 | HEIGHT: 75 IN | DIASTOLIC BLOOD PRESSURE: 80 MMHG | WEIGHT: 246.94 LBS | TEMPERATURE: 98 F | SYSTOLIC BLOOD PRESSURE: 110 MMHG

## 2022-11-29 DIAGNOSIS — Z51.81 MEDICATION MONITORING ENCOUNTER: ICD-10-CM

## 2022-11-29 DIAGNOSIS — Z00.00 ANNUAL PHYSICAL EXAM: Primary | ICD-10-CM

## 2022-11-29 DIAGNOSIS — H91.93 BILATERAL HEARING LOSS, UNSPECIFIED HEARING LOSS TYPE: ICD-10-CM

## 2022-11-29 PROCEDURE — 3079F PR MOST RECENT DIASTOLIC BLOOD PRESSURE 80-89 MM HG: ICD-10-PCS | Mod: CPTII,S$GLB,, | Performed by: FAMILY MEDICINE

## 2022-11-29 PROCEDURE — 99999 PR PBB SHADOW E&M-EST. PATIENT-LVL IV: CPT | Mod: PBBFAC,,, | Performed by: FAMILY MEDICINE

## 2022-11-29 PROCEDURE — 1160F PR REVIEW ALL MEDS BY PRESCRIBER/CLIN PHARMACIST DOCUMENTED: ICD-10-PCS | Mod: CPTII,S$GLB,, | Performed by: FAMILY MEDICINE

## 2022-11-29 PROCEDURE — 3008F BODY MASS INDEX DOCD: CPT | Mod: CPTII,S$GLB,, | Performed by: FAMILY MEDICINE

## 2022-11-29 PROCEDURE — 1160F RVW MEDS BY RX/DR IN RCRD: CPT | Mod: CPTII,S$GLB,, | Performed by: FAMILY MEDICINE

## 2022-11-29 PROCEDURE — 1159F MED LIST DOCD IN RCRD: CPT | Mod: CPTII,S$GLB,, | Performed by: FAMILY MEDICINE

## 2022-11-29 PROCEDURE — 3008F PR BODY MASS INDEX (BMI) DOCUMENTED: ICD-10-PCS | Mod: CPTII,S$GLB,, | Performed by: FAMILY MEDICINE

## 2022-11-29 PROCEDURE — 99396 PR PREVENTIVE VISIT,EST,40-64: ICD-10-PCS | Mod: S$GLB,,, | Performed by: FAMILY MEDICINE

## 2022-11-29 PROCEDURE — 1159F PR MEDICATION LIST DOCUMENTED IN MEDICAL RECORD: ICD-10-PCS | Mod: CPTII,S$GLB,, | Performed by: FAMILY MEDICINE

## 2022-11-29 PROCEDURE — 3079F DIAST BP 80-89 MM HG: CPT | Mod: CPTII,S$GLB,, | Performed by: FAMILY MEDICINE

## 2022-11-29 PROCEDURE — 3074F PR MOST RECENT SYSTOLIC BLOOD PRESSURE < 130 MM HG: ICD-10-PCS | Mod: CPTII,S$GLB,, | Performed by: FAMILY MEDICINE

## 2022-11-29 PROCEDURE — 99999 PR PBB SHADOW E&M-EST. PATIENT-LVL IV: ICD-10-PCS | Mod: PBBFAC,,, | Performed by: FAMILY MEDICINE

## 2022-11-29 PROCEDURE — 3074F SYST BP LT 130 MM HG: CPT | Mod: CPTII,S$GLB,, | Performed by: FAMILY MEDICINE

## 2022-11-29 PROCEDURE — 99396 PREV VISIT EST AGE 40-64: CPT | Mod: S$GLB,,, | Performed by: FAMILY MEDICINE

## 2022-11-29 RX ORDER — DEXTROAMPHETAMINE SACCHARATE, AMPHETAMINE ASPARTATE, DEXTROAMPHETAMINE SULFATE AND AMPHETAMINE SULFATE 5; 5; 5; 5 MG/1; MG/1; MG/1; MG/1
60 TABLET ORAL DAILY
COMMUNITY
Start: 2022-08-13 | End: 2023-02-28

## 2022-11-29 NOTE — LETTER
November 29, 2022      [facility]  [address] [place] [address]  Phone: [phone]  Fax: [phone]       Patient: Eugenie Bennett   YOB: 1980  Date of Visit: 11/29/2022    To Whom It May Concern:    Guillermina Bennett  was at [facility] on 11/29/2022. The patient may return to work/school on 11/30/22 with no restrictions. If you have any questions or concerns, or if I can be of further assistance, please do not hesitate to contact me.    Sincerely,    Destini Grande MA

## 2022-11-29 NOTE — PROGRESS NOTES
Subjective:      Patient ID: Eugenie Bennett is a 42 y.o. male.    Chief Complaint: Annual Exam    HPI 42 y.o.   male patient with a PMHx of ADD and GERD presents to clinic for annual exam. The patient was last seen on May 14, 2021      Today he reports he have been taking three Adderall 20 mg daily because the 30 mg are unavailable through the pharmacy. Patient reports he resumed his nexium last month. Patient otherwise without complaints. Denies SOB, chest pain, and bowel changes.       Past Medical History:   Diagnosis Date    ADD (attention deficit disorder)     Dr. Aliya Sparrow    GERD (gastroesophageal reflux disease)      Family History   Problem Relation Age of Onset    Colon cancer Mother          at 47    Cancer Maternal Grandmother         unsure of type    Alcohol abuse Maternal Grandfather         Cancer unknown type     Past Surgical History:   Procedure Laterality Date    ADENOIDECTOMY      CAUTERY OF TURBINATES Bilateral 2018    Procedure: CAUTERIZATION, MUCOSA OF NASAL TURBINATE;  Surgeon: Rocky Garcia MD;  Location: [facility] OR;  Service: ENT;  Laterality: Bilateral;    COLONOSCOPY N/A 2018    Procedure: COLONOSCOPY;  Surgeon: Clifton Grande III, MD;  Location: [facility];  Service: Endoscopy;  Laterality: N/A;    ESOPHAGOGASTRODUODENOSCOPY N/A 2018    Procedure: EGD (ESOPHAGOGASTRODUODENOSCOPY);  Surgeon: Clifton Grande III, MD;  Location: [facility];  Service: Endoscopy;  Laterality: N/A;    NASAL SEPTOPLASTY N/A 2018    Procedure: SEPTOPLASTY, NASAL;  Surgeon: Rocky Garcia MD;  Location: [facility] OR;  Service: ENT;  Laterality: N/A;    NASAL STENOSIS REPAIR Bilateral 2018    Procedure: REPAIR, STENOSIS, NOSE, VESTIBULE;  Surgeon: Rocky Garcia MD;  Location: [facility] OR;  Service: ENT;  Laterality: Bilateral;    SINUS SURGERY Bilateral 05/15/2019    REPAIR VESTIBULAR STENOSIS; SEPTOPLASTY; SUBMUCOSAL RESECTION- 50; ANTROSTOMY C ROT- 50; ETHMOIDECTOMY: ANTERIOR- 50; TURBINATE  "FRACTURE- 50.    SINUS SURGERY  06/28/2021    Image guided B total ethmoidectomy, B maxillary antrostomy c ROT, B sphenoidotomy c ROT, B frontal sinusotomy    TONSILLECTOMY      TYMPANOSTOMY TUBE PLACEMENT      x 2     Social History     Tobacco Use    Smoking status: Never    Smokeless tobacco: Current     Types: Chew    Tobacco comments:     Smoker for maybe a year   Substance Use Topics    Alcohol use: Yes    Drug use: No       /80   Pulse 92   Temp 98.2 °F (36.8 °C)   Ht 6' 3" (1.905 m)   Wt 112 kg (246 lb 14.6 oz)   BMI 30.86 kg/m²     Review of Systems   Constitutional:  Negative for activity change, appetite change, chills, diaphoresis, fatigue, fever and unexpected weight change.   HENT:  Negative for hearing loss and rhinorrhea.    Eyes:  Negative for discharge and visual disturbance.   Respiratory:  Negative for cough, chest tightness, shortness of breath and wheezing.    Cardiovascular:  Negative for chest pain, palpitations and leg swelling.   Gastrointestinal:  Negative for abdominal distention, abdominal pain, blood in stool, constipation, diarrhea and vomiting.   Endocrine: Negative for polydipsia and polyuria.   Genitourinary:  Negative for difficulty urinating, dysuria, frequency, hematuria and urgency.   Musculoskeletal:  Negative for arthralgias, back pain, joint swelling and neck pain.   Skin:  Negative for color change and rash.   Neurological:  Negative for weakness and headaches.   Hematological:  Negative for adenopathy.   Psychiatric/Behavioral:  Negative for confusion, decreased concentration and dysphoric mood.      Objective:     Physical Exam  Vitals and nursing note reviewed.   Constitutional:       General: He is not in acute distress.  HENT:      Right Ear: External ear normal.      Left Ear: External ear normal.      Nose: Nose normal.   Eyes:      Conjunctiva/sclera: Conjunctivae normal.      Pupils: Pupils are equal, round, and reactive to light.   Neck:      Vascular: " No carotid bruit.   Cardiovascular:      Rate and Rhythm: Normal rate and regular rhythm.      Heart sounds: Normal heart sounds.   Pulmonary:      Effort: Pulmonary effort is normal. No respiratory distress.      Breath sounds: Normal breath sounds. No wheezing or rales.   Abdominal:      General: Bowel sounds are normal. There is no distension.      Palpations: Abdomen is soft.      Tenderness: There is no abdominal tenderness. There is no guarding.   Musculoskeletal:      Cervical back: Normal range of motion and neck supple.      Right lower leg: No edema.      Left lower leg: No edema.   Skin:     General: Skin is warm and dry.      Findings: No rash.   Neurological:      Mental Status: He is alert and oriented to person, place, and time.   Psychiatric:         Behavior: Behavior normal.         Thought Content: Thought content normal.         Judgment: Judgment normal.       Lab Results   Component Value Date    WBC 7.44 06/14/2021    HGB 14.7 06/14/2021    HCT 42.4 06/14/2021     06/14/2021    CHOL 188 01/26/2018    TRIG 69 01/26/2018    HDL 45 01/26/2018    ALT 27 06/14/2021    AST 29 06/14/2021     (L) 06/14/2021    K 3.2 (L) 06/14/2021     06/14/2021    CREATININE 1.4 06/14/2021    BUN 10 06/14/2021    CO2 24 06/14/2021    TSH 0.799 02/01/2019    INR 1.0 06/14/2021       Assessment:     1. Annual physical exam    2. Medication monitoring encounter         Plan:     Annual physical exam  -     CBC Auto Differential; Future; Expected date: 12/02/2022  -     Comprehensive Metabolic Panel; Future; Expected date: 12/02/2022  -     Hemoglobin A1C; Future; Expected date: 12/02/2022  -     Lipid Panel; Future; Expected date: 12/02/2022  -     TSH; Future; Expected date: 12/02/2022  -     HIV 1/2 Ag/Ab (4th Gen); Future; Expected date: 12/02/2022  -     Hepatitis C Antibody; Future; Expected date: 12/02/2022    Medication monitoring encounter  -     EKG 12-lead      Vitals reviewed. BP within  normal range at 110/80.    Patient will return at a later date to update fasting labs and EKG.  Patient declines flu vaccine.  Cont per specialty  Work on better lifestyle/diet and exercise  F/u annually and PRN        Documentation entered by Gio Martines, acting as scribe for Dr. Bebo Conroy. 11/29/2022 10:39 AM.

## 2022-12-06 ENCOUNTER — CLINICAL SUPPORT (OUTPATIENT)
Dept: CARDIOLOGY | Facility: CLINIC | Age: 42
End: 2022-12-06
Payer: COMMERCIAL

## 2022-12-06 ENCOUNTER — LAB VISIT (OUTPATIENT)
Dept: LAB | Facility: HOSPITAL | Age: 42
End: 2022-12-06
Attending: FAMILY MEDICINE
Payer: COMMERCIAL

## 2022-12-06 ENCOUNTER — CLINICAL SUPPORT (OUTPATIENT)
Dept: AUDIOLOGY | Facility: CLINIC | Age: 42
End: 2022-12-06
Payer: COMMERCIAL

## 2022-12-06 DIAGNOSIS — H90.3 HEARING LOSS, SENSORINEURAL, ASYMMETRICAL: ICD-10-CM

## 2022-12-06 DIAGNOSIS — Z00.00 ANNUAL PHYSICAL EXAM: ICD-10-CM

## 2022-12-06 LAB
ALBUMIN SERPL BCP-MCNC: 3.9 G/DL (ref 3.5–5.2)
ALP SERPL-CCNC: 82 U/L (ref 55–135)
ALT SERPL W/O P-5'-P-CCNC: 36 U/L (ref 10–44)
ANION GAP SERPL CALC-SCNC: 9 MMOL/L (ref 8–16)
AST SERPL-CCNC: 31 U/L (ref 10–40)
BASOPHILS # BLD AUTO: 0.06 K/UL (ref 0–0.2)
BASOPHILS NFR BLD: 0.9 % (ref 0–1.9)
BILIRUB SERPL-MCNC: 0.8 MG/DL (ref 0.1–1)
BUN SERPL-MCNC: 9 MG/DL (ref 6–20)
CALCIUM SERPL-MCNC: 9 MG/DL (ref 8.7–10.5)
CHLORIDE SERPL-SCNC: 109 MMOL/L (ref 95–110)
CHOLEST SERPL-MCNC: 199 MG/DL (ref 120–199)
CHOLEST/HDLC SERPL: 5.2 {RATIO} (ref 2–5)
CO2 SERPL-SCNC: 21 MMOL/L (ref 23–29)
CREAT SERPL-MCNC: 0.9 MG/DL (ref 0.5–1.4)
DIFFERENTIAL METHOD: NORMAL
EOSINOPHIL # BLD AUTO: 0.3 K/UL (ref 0–0.5)
EOSINOPHIL NFR BLD: 4 % (ref 0–8)
ERYTHROCYTE [DISTWIDTH] IN BLOOD BY AUTOMATED COUNT: 12.6 % (ref 11.5–14.5)
EST. GFR  (NO RACE VARIABLE): >60 ML/MIN/1.73 M^2
ESTIMATED AVG GLUCOSE: 94 MG/DL (ref 68–131)
GLUCOSE SERPL-MCNC: 92 MG/DL (ref 70–110)
HBA1C MFR BLD: 4.9 % (ref 4–5.6)
HCT VFR BLD AUTO: 44.5 % (ref 40–54)
HCV AB SERPL QL IA: NORMAL
HDLC SERPL-MCNC: 38 MG/DL (ref 40–75)
HDLC SERPL: 19.1 % (ref 20–50)
HGB BLD-MCNC: 15.2 G/DL (ref 14–18)
HIV 1+2 AB+HIV1 P24 AG SERPL QL IA: NORMAL
IMM GRANULOCYTES # BLD AUTO: 0.03 K/UL (ref 0–0.04)
IMM GRANULOCYTES NFR BLD AUTO: 0.4 % (ref 0–0.5)
LDLC SERPL CALC-MCNC: 134 MG/DL (ref 63–159)
LYMPHOCYTES # BLD AUTO: 1.9 K/UL (ref 1–4.8)
LYMPHOCYTES NFR BLD: 27.3 % (ref 18–48)
MCH RBC QN AUTO: 31 PG (ref 27–31)
MCHC RBC AUTO-ENTMCNC: 34.2 G/DL (ref 32–36)
MCV RBC AUTO: 91 FL (ref 82–98)
MONOCYTES # BLD AUTO: 0.6 K/UL (ref 0.3–1)
MONOCYTES NFR BLD: 8.4 % (ref 4–15)
NEUTROPHILS # BLD AUTO: 4.1 K/UL (ref 1.8–7.7)
NEUTROPHILS NFR BLD: 59 % (ref 38–73)
NONHDLC SERPL-MCNC: 161 MG/DL
NRBC BLD-RTO: 0 /100 WBC
PLATELET # BLD AUTO: 359 K/UL (ref 150–450)
PMV BLD AUTO: 10.3 FL (ref 9.2–12.9)
POTASSIUM SERPL-SCNC: 4.6 MMOL/L (ref 3.5–5.1)
PROT SERPL-MCNC: 6.9 G/DL (ref 6–8.4)
RBC # BLD AUTO: 4.9 M/UL (ref 4.6–6.2)
SODIUM SERPL-SCNC: 139 MMOL/L (ref 136–145)
TRIGL SERPL-MCNC: 135 MG/DL (ref 30–150)
TSH SERPL DL<=0.005 MIU/L-ACNC: 1.46 UIU/ML (ref 0.4–4)
WBC # BLD AUTO: 7 K/UL (ref 3.9–12.7)

## 2022-12-06 PROCEDURE — 93010 EKG 12-LEAD: ICD-10-PCS | Mod: S$GLB,,, | Performed by: INTERNAL MEDICINE

## 2022-12-06 PROCEDURE — 92557 COMPREHENSIVE HEARING TEST: CPT | Mod: S$GLB,,, | Performed by: AUDIOLOGIST

## 2022-12-06 PROCEDURE — 99999 PR PBB SHADOW E&M-EST. PATIENT-LVL I: CPT | Mod: PBBFAC,,, | Performed by: AUDIOLOGIST

## 2022-12-06 PROCEDURE — 93005 ELECTROCARDIOGRAM TRACING: CPT | Mod: S$GLB,,, | Performed by: FAMILY MEDICINE

## 2022-12-06 PROCEDURE — 99999 PR PBB SHADOW E&M-EST. PATIENT-LVL I: ICD-10-PCS | Mod: PBBFAC,,, | Performed by: AUDIOLOGIST

## 2022-12-06 PROCEDURE — 80061 LIPID PANEL: CPT | Performed by: FAMILY MEDICINE

## 2022-12-06 PROCEDURE — 83036 HEMOGLOBIN GLYCOSYLATED A1C: CPT | Performed by: FAMILY MEDICINE

## 2022-12-06 PROCEDURE — 85025 COMPLETE CBC W/AUTO DIFF WBC: CPT | Performed by: FAMILY MEDICINE

## 2022-12-06 PROCEDURE — 36415 COLL VENOUS BLD VENIPUNCTURE: CPT | Mod: PO | Performed by: FAMILY MEDICINE

## 2022-12-06 PROCEDURE — 84443 ASSAY THYROID STIM HORMONE: CPT | Performed by: FAMILY MEDICINE

## 2022-12-06 PROCEDURE — 92557 PR COMPREHENSIVE HEARING TEST: ICD-10-PCS | Mod: S$GLB,,, | Performed by: AUDIOLOGIST

## 2022-12-06 PROCEDURE — 93010 ELECTROCARDIOGRAM REPORT: CPT | Mod: S$GLB,,, | Performed by: INTERNAL MEDICINE

## 2022-12-06 PROCEDURE — 92567 PR TYMPA2METRY: ICD-10-PCS | Mod: S$GLB,,, | Performed by: AUDIOLOGIST

## 2022-12-06 PROCEDURE — 87389 HIV-1 AG W/HIV-1&-2 AB AG IA: CPT | Performed by: FAMILY MEDICINE

## 2022-12-06 PROCEDURE — 86803 HEPATITIS C AB TEST: CPT | Performed by: FAMILY MEDICINE

## 2022-12-06 PROCEDURE — 93005 EKG 12-LEAD: ICD-10-PCS | Mod: S$GLB,,, | Performed by: FAMILY MEDICINE

## 2022-12-06 PROCEDURE — 92567 TYMPANOMETRY: CPT | Mod: S$GLB,,, | Performed by: AUDIOLOGIST

## 2022-12-06 PROCEDURE — 80053 COMPREHEN METABOLIC PANEL: CPT | Performed by: FAMILY MEDICINE

## 2022-12-06 NOTE — PROGRESS NOTES
Eugenie Bennett was seen 12/06/2022 for an audiological evaluation.  Patient complains of bilateral gradual hearing loss and constant bilateral tinnitus. He has a history of extensive noise exposure. He complains that he asks others to repeat often and his family is complaining.     Results reveal a mild-to-moderately severe sensorineural hearing loss 500-1000 and 0112-8314 Hz for the right ear, and  mild-to-moderate sensorineural hearing loss 750-8000 Hz for the left ear.   Speech Reception Thresholds were  25 dBHL for the right ear and 25 dBHL for the left ear.   Word recognition scores were excellent for the right ear and excellent for the left ear.   Tympanograms were Type Ad, hypermobile for the right ear and Type Ad, hypermobile for the left ear.    Patient was counseled on the above findings.     Recommendations include:    1.  ENT follow-up re:asymmetry   2.  Hearing aid consult (information was given to patient at today's visit) copy of audiogram given  3.  Wear hearing protective devices around loud noise  4.  Annual audiograms

## 2022-12-06 NOTE — LETTER
December 6, 2022      [facility]  [address]  [place] [address]  Phone: [phone]  Fax: [phone]       Patient: Eugenie Bennett   YOB: 1980  Date of Visit: 12/06/2022    To Whom It May Concern:    Guillermina Bennett  was at [facility] on 12/6/22. The patient may return to work/school on 12/7/22 with no restrictions. If you have any questions or concerns, or if I can be of further assistance, please do not hesitate to contact me.    Sincerely,    Dulce Lopez CCC-A

## 2022-12-23 ENCOUNTER — OFFICE VISIT (OUTPATIENT)
Dept: OTOLARYNGOLOGY | Facility: CLINIC | Age: 42
End: 2022-12-23
Payer: COMMERCIAL

## 2022-12-23 VITALS — WEIGHT: 254 LBS | BODY MASS INDEX: 31.74 KG/M2 | TEMPERATURE: 96 F

## 2022-12-23 DIAGNOSIS — H90.3 ASYMMETRIC SNHL (SENSORINEURAL HEARING LOSS): ICD-10-CM

## 2022-12-23 DIAGNOSIS — H90.A22 SENSORINEURAL HEARING LOSS (SNHL) OF LEFT EAR WITH RESTRICTED HEARING OF RIGHT EAR: Primary | ICD-10-CM

## 2022-12-23 DIAGNOSIS — H93.13 TINNITUS OF BOTH EARS: ICD-10-CM

## 2022-12-23 PROCEDURE — 1159F MED LIST DOCD IN RCRD: CPT | Mod: CPTII,S$GLB,, | Performed by: PHYSICIAN ASSISTANT

## 2022-12-23 PROCEDURE — 3008F PR BODY MASS INDEX (BMI) DOCUMENTED: ICD-10-PCS | Mod: CPTII,S$GLB,, | Performed by: PHYSICIAN ASSISTANT

## 2022-12-23 PROCEDURE — 3008F BODY MASS INDEX DOCD: CPT | Mod: CPTII,S$GLB,, | Performed by: PHYSICIAN ASSISTANT

## 2022-12-23 PROCEDURE — 3044F PR MOST RECENT HEMOGLOBIN A1C LEVEL <7.0%: ICD-10-PCS | Mod: CPTII,S$GLB,, | Performed by: PHYSICIAN ASSISTANT

## 2022-12-23 PROCEDURE — 3044F HG A1C LEVEL LT 7.0%: CPT | Mod: CPTII,S$GLB,, | Performed by: PHYSICIAN ASSISTANT

## 2022-12-23 PROCEDURE — 1159F PR MEDICATION LIST DOCUMENTED IN MEDICAL RECORD: ICD-10-PCS | Mod: CPTII,S$GLB,, | Performed by: PHYSICIAN ASSISTANT

## 2022-12-23 PROCEDURE — 99999 PR PBB SHADOW E&M-EST. PATIENT-LVL III: ICD-10-PCS | Mod: PBBFAC,,, | Performed by: PHYSICIAN ASSISTANT

## 2022-12-23 PROCEDURE — 99214 PR OFFICE/OUTPT VISIT, EST, LEVL IV, 30-39 MIN: ICD-10-PCS | Mod: S$GLB,,, | Performed by: PHYSICIAN ASSISTANT

## 2022-12-23 PROCEDURE — 99999 PR PBB SHADOW E&M-EST. PATIENT-LVL III: CPT | Mod: PBBFAC,,, | Performed by: PHYSICIAN ASSISTANT

## 2022-12-23 PROCEDURE — 99214 OFFICE O/P EST MOD 30 MIN: CPT | Mod: S$GLB,,, | Performed by: PHYSICIAN ASSISTANT

## 2022-12-23 NOTE — PROGRESS NOTES
Subjective:       Patient ID: Eugenie Bennett is a 42 y.o. male.    Chief Complaint: review audio (Decreased hearing for years getting worse)    Patient is a very pleasant 42 y.o. male here to see me today for the first time for evaluation of hearing loss.  He reports hearing loss that has been gradually progressing over the last several years.  He has not noted any difference in hearing between the ears, with either ear being the better hearing ear.  He has noted constant tinnitus in both ears for years; ringing sound, uses fan at night so he can sleep.  He has not had any recent issues with ear pain or ear drainage.  He denies a family history of hearing loss, and had two previous otologic surgeries (two sets of tubes in childhood).  He has a history of significant loud noise exposure (occupational and firearms). He denies issues with dizziness.  He complains that he asks others to repeat often and his family is complaining about his hearing and TV volume being too loud.           Review of Systems   HENT:  Positive for hearing loss and tinnitus.    Neurological:  Negative for dizziness.       Objective:      Physical Exam  Constitutional:       Appearance: Normal appearance.   HENT:      Head: Normocephalic and atraumatic.      Right Ear: Tympanic membrane, ear canal and external ear normal.      Left Ear: Tympanic membrane, ear canal and external ear normal.      Nose: Nose normal.   Pulmonary:      Effort: Pulmonary effort is normal.   Neurological:      General: No focal deficit present.      Mental Status: He is alert.           AUDIOGRAM:    Results reveal a mild-to-moderately severe sensorineural hearing loss 500-1000 and 0877-4396 Hz for the right ear, and  mild-to-moderate sensorineural hearing loss 750-8000 Hz for the left ear.   Speech Reception Thresholds were  25 dBHL for the right ear and 25 dBHL for the left ear.   Word recognition scores were excellent for the right ear and excellent for the  left ear.   Tympanograms were Type Ad, hypermobile for the right ear and Type Ad, hypermobile for the left ear.           Assessment:       Problem List Items Addressed This Visit    None  Visit Diagnoses       Sensorineural hearing loss (SNHL) of left ear with restricted hearing of right ear    -  Primary    Relevant Orders    MRI IAC/Temporal Bones W W/O Contrast    Asymmetric SNHL (sensorineural hearing loss)        Tinnitus of both ears                  Plan:       We reviewed the recent audiogram, and the fact that there is a distinct asymmetry of at least 15 dB across 3 frequencies.  We discussed that as humans we are not entirely symmetric, and that many people have one ear that hears better than the other.  However, considering the degree of asymmetry, I would recommend an MRI of the IAC with contrast to evaluate for any retrocochlear lesions.  If that is normal, the patient will continue to follow with annual audiograms and will be medically cleared for hearing aids when motivated.    We reviewed his audiogram together in detail.  We also discussed that tinnitus is most often caused by a hearing loss, and that as the hair cells are damaged, either genetic or as a result of loud noise exposure, they then cause tinnitus.  Some patients find that restricting the salt or caffeine in their diet helps, and there is also an OTC supplement, lipoflavinoids, that some people find to be effective though their benefit is not fully proven.  Tinnitus tends to be louder in times of stress and fatigue, and may decrease with time.  Sound machines may also be an effective masking technique if needed at night.

## 2022-12-28 ENCOUNTER — PATIENT MESSAGE (OUTPATIENT)
Dept: OTOLARYNGOLOGY | Facility: CLINIC | Age: 42
End: 2022-12-28
Payer: COMMERCIAL

## 2023-01-27 ENCOUNTER — PATIENT MESSAGE (OUTPATIENT)
Dept: OTOLARYNGOLOGY | Facility: CLINIC | Age: 43
End: 2023-01-27
Payer: MEDICAID

## 2023-01-27 DIAGNOSIS — H90.A22 SENSORINEURAL HEARING LOSS (SNHL) OF LEFT EAR WITH RESTRICTED HEARING OF RIGHT EAR: Primary | ICD-10-CM

## 2023-01-31 ENCOUNTER — PATIENT MESSAGE (OUTPATIENT)
Dept: OTOLARYNGOLOGY | Facility: CLINIC | Age: 43
End: 2023-01-31
Payer: MEDICAID

## 2023-02-06 ENCOUNTER — TELEPHONE (OUTPATIENT)
Dept: OTOLARYNGOLOGY | Facility: CLINIC | Age: 43
End: 2023-02-06
Payer: MEDICAID

## 2023-02-17 ENCOUNTER — PATIENT MESSAGE (OUTPATIENT)
Dept: OTOLARYNGOLOGY | Facility: CLINIC | Age: 43
End: 2023-02-17
Payer: MEDICAID

## 2023-02-21 ENCOUNTER — PATIENT MESSAGE (OUTPATIENT)
Dept: INTERNAL MEDICINE | Facility: CLINIC | Age: 43
End: 2023-02-21
Payer: MEDICAID

## 2023-02-23 ENCOUNTER — TELEPHONE (OUTPATIENT)
Dept: GASTROENTEROLOGY | Facility: CLINIC | Age: 43
End: 2023-02-23
Payer: MEDICAID

## 2023-02-23 NOTE — TELEPHONE ENCOUNTER
----- Message from Marium Kenney RN sent at 2/20/2023 10:30 AM CST -----  Contact: [phone]    ----- Message -----  From: Misha Jones  Sent: 2/20/2023  10:30 AM CST  To: [facility] Clinical Staff    Type:  Sooner Apoointment Request    Caller is requesting a sooner appointment.  Caller declined first available appointment listed below.  Caller will not accept being placed on the waitlist and is requesting a message be sent to doctor.  Name of Caller:Libby  When is the first available appointment?n/a   Symptoms: follow up   Would the patient rather a call back or a response via [facility]? Call back   Best Call Back Number:[phone]  Additional Information: n/a

## 2023-02-28 ENCOUNTER — TELEPHONE (OUTPATIENT)
Dept: INTERNAL MEDICINE | Facility: CLINIC | Age: 43
End: 2023-02-28
Payer: MEDICAID

## 2023-02-28 ENCOUNTER — OFFICE VISIT (OUTPATIENT)
Dept: INTERNAL MEDICINE | Facility: CLINIC | Age: 43
End: 2023-02-28
Payer: MEDICAID

## 2023-02-28 VITALS
HEART RATE: 124 BPM | SYSTOLIC BLOOD PRESSURE: 120 MMHG | HEIGHT: 75 IN | DIASTOLIC BLOOD PRESSURE: 80 MMHG | BODY MASS INDEX: 30.29 KG/M2 | TEMPERATURE: 98 F | WEIGHT: 243.63 LBS

## 2023-02-28 DIAGNOSIS — K21.9 GASTROESOPHAGEAL REFLUX DISEASE, UNSPECIFIED WHETHER ESOPHAGITIS PRESENT: Primary | ICD-10-CM

## 2023-02-28 DIAGNOSIS — K92.0 COFFEE GROUND EMESIS: ICD-10-CM

## 2023-02-28 PROCEDURE — 3074F SYST BP LT 130 MM HG: CPT | Mod: CPTII,,, | Performed by: FAMILY MEDICINE

## 2023-02-28 PROCEDURE — 99213 PR OFFICE/OUTPT VISIT, EST, LEVL III, 20-29 MIN: ICD-10-PCS | Mod: S$PBB,,, | Performed by: FAMILY MEDICINE

## 2023-02-28 PROCEDURE — 3079F PR MOST RECENT DIASTOLIC BLOOD PRESSURE 80-89 MM HG: ICD-10-PCS | Mod: CPTII,,, | Performed by: FAMILY MEDICINE

## 2023-02-28 PROCEDURE — 3074F PR MOST RECENT SYSTOLIC BLOOD PRESSURE < 130 MM HG: ICD-10-PCS | Mod: CPTII,,, | Performed by: FAMILY MEDICINE

## 2023-02-28 PROCEDURE — 3079F DIAST BP 80-89 MM HG: CPT | Mod: CPTII,,, | Performed by: FAMILY MEDICINE

## 2023-02-28 PROCEDURE — 99213 OFFICE O/P EST LOW 20 MIN: CPT | Mod: S$PBB,,, | Performed by: FAMILY MEDICINE

## 2023-02-28 PROCEDURE — 1160F RVW MEDS BY RX/DR IN RCRD: CPT | Mod: CPTII,,, | Performed by: FAMILY MEDICINE

## 2023-02-28 PROCEDURE — 99999 PR PBB SHADOW E&M-EST. PATIENT-LVL III: CPT | Mod: PBBFAC,,, | Performed by: FAMILY MEDICINE

## 2023-02-28 PROCEDURE — 1159F PR MEDICATION LIST DOCUMENTED IN MEDICAL RECORD: ICD-10-PCS | Mod: CPTII,,, | Performed by: FAMILY MEDICINE

## 2023-02-28 PROCEDURE — 99999 PR PBB SHADOW E&M-EST. PATIENT-LVL III: ICD-10-PCS | Mod: PBBFAC,,, | Performed by: FAMILY MEDICINE

## 2023-02-28 PROCEDURE — 1160F PR REVIEW ALL MEDS BY PRESCRIBER/CLIN PHARMACIST DOCUMENTED: ICD-10-PCS | Mod: CPTII,,, | Performed by: FAMILY MEDICINE

## 2023-02-28 PROCEDURE — 99213 OFFICE O/P EST LOW 20 MIN: CPT | Mod: PBBFAC,PO | Performed by: FAMILY MEDICINE

## 2023-02-28 PROCEDURE — 1159F MED LIST DOCD IN RCRD: CPT | Mod: CPTII,,, | Performed by: FAMILY MEDICINE

## 2023-02-28 PROCEDURE — 3008F BODY MASS INDEX DOCD: CPT | Mod: CPTII,,, | Performed by: FAMILY MEDICINE

## 2023-02-28 PROCEDURE — 3008F PR BODY MASS INDEX (BMI) DOCUMENTED: ICD-10-PCS | Mod: CPTII,,, | Performed by: FAMILY MEDICINE

## 2023-02-28 RX ORDER — PANTOPRAZOLE SODIUM 40 MG/1
40 TABLET, DELAYED RELEASE ORAL DAILY
Status: ON HOLD | COMMUNITY
End: 2023-03-20 | Stop reason: SDUPTHER

## 2023-02-28 RX ORDER — LISDEXAMFETAMINE DIMESYLATE 70 MG/1
70 CAPSULE ORAL EVERY MORNING
COMMUNITY
End: 2024-03-01

## 2023-02-28 NOTE — TELEPHONE ENCOUNTER
Nory Arthur MA  You 6 minutes ago (9:12 AM)       How long has the patient been having the coffee ground emesis? We would recommend the ER in case it is an active GI bleed we want to get them assessed ASAP

## 2023-02-28 NOTE — PROGRESS NOTES
Subjective:      Patient ID: Eugenie Bennett is a 42 y.o. male.    Chief Complaint: Gastroesophageal Reflux     42 y.o.   male patient with a PMHx of ADD and GERD presents to clinic for gastroesophageal reflux. The patient was last seen on 2022      Today he reports he is wanting to see a GI doctor because he is still coughing up mucus and experiencing a constant drip after being on steroids. Dr. Tu Noland//ENT is thinking it is more GERD related.    Pt is taking PPI daily//40mg minimum.  Patient reports he is constantly blowing his nose. He states he did spit up blood a couple of times when his reflux flared up bad and he will have coffee ground vomitus with it.  Not happening frequently or on a regular basis.  Bowels are ok, occasionally loose.  Is taking ADD meds and is on a very high dose.    Past Medical History:   Diagnosis Date    ADD (attention deficit disorder)     Dr. Aliya Sparrow    GERD (gastroesophageal reflux disease)      Family History   Problem Relation Age of Onset    Colon cancer Mother          at 47    Cancer Maternal Grandmother         unsure of type    Alcohol abuse Maternal Grandfather         Cancer unknown type     Past Surgical History:   Procedure Laterality Date    ADENOIDECTOMY      CAUTERY OF TURBINATES Bilateral 2018    Procedure: CAUTERIZATION, MUCOSA OF NASAL TURBINATE;  Surgeon: Rocky Garcia MD;  Location: [facility] OR;  Service: ENT;  Laterality: Bilateral;    COLONOSCOPY N/A 2018    Procedure: COLONOSCOPY;  Surgeon: Clifton Grande III, MD;  Location: [facility];  Service: Endoscopy;  Laterality: N/A;    ESOPHAGOGASTRODUODENOSCOPY N/A 2018    Procedure: EGD (ESOPHAGOGASTRODUODENOSCOPY);  Surgeon: Clifton Grande III, MD;  Location: [facility] ENDO;  Service: Endoscopy;  Laterality: N/A;    NASAL SEPTOPLASTY N/A 2018    Procedure: SEPTOPLASTY, NASAL;  Surgeon: Rocky Garcia MD;  Location: [facility] OR;  Service: ENT;  Laterality: N/A;    NASAL  "STENOSIS REPAIR Bilateral 8/8/2018    Procedure: REPAIR, STENOSIS, NOSE, VESTIBULE;  Surgeon: Rocky Garcia MD;  Location: [facility];  Service: ENT;  Laterality: Bilateral;    SINUS SURGERY Bilateral 05/15/2019    REPAIR VESTIBULAR STENOSIS; SEPTOPLASTY; SUBMUCOSAL RESECTION- 50; ANTROSTOMY C ROT- 50; ETHMOIDECTOMY: ANTERIOR- 50; TURBINATE FRACTURE- 50.    SINUS SURGERY  06/28/2021    Image guided B total ethmoidectomy, B maxillary antrostomy c ROT, B sphenoidotomy c ROT, B frontal sinusotomy    TONSILLECTOMY      TYMPANOSTOMY TUBE PLACEMENT      x 2     Social History     Tobacco Use    Smoking status: Never    Smokeless tobacco: Current     Types: Chew    Tobacco comments:     Smoker for maybe a year   Substance Use Topics    Alcohol use: Yes    Drug use: No       /80   Pulse (!) 124   Temp 98.2 °F (36.8 °C)   Ht 6' 3" (1.905 m)   Wt 110.5 kg (243 lb 9.7 oz)   BMI 30.45 kg/m²     Review of Systems   Constitutional:  Negative for activity change, appetite change, chills, diaphoresis, fatigue, fever and unexpected weight change.   HENT:  Negative for hearing loss and rhinorrhea.    Eyes:  Negative for discharge and visual disturbance.   Respiratory:  Negative for cough, chest tightness, shortness of breath and wheezing.    Cardiovascular:  Negative for chest pain, palpitations and leg swelling.   Gastrointestinal:  Negative for abdominal distention, abdominal pain, blood in stool, constipation, diarrhea and vomiting.   Endocrine: Negative for polydipsia and polyuria.   Genitourinary:  Negative for difficulty urinating, dysuria, frequency, hematuria and urgency.   Musculoskeletal:  Negative for arthralgias, back pain, joint swelling and neck pain.   Skin:  Negative for color change and rash.   Neurological:  Negative for weakness and headaches.   Hematological:  Negative for adenopathy.   Psychiatric/Behavioral:  Negative for confusion, decreased concentration and dysphoric mood.      Objective:     Physical " Exam  Constitutional:       General: He is not in acute distress.     Appearance: He is well-developed. He is not diaphoretic.   HENT:      Head: Normocephalic and atraumatic.   Eyes:      General:         Right eye: No discharge.         Left eye: No discharge.   Cardiovascular:      Rate and Rhythm: Normal rate and regular rhythm.   Pulmonary:      Effort: Pulmonary effort is normal. No respiratory distress.   Abdominal:      General: Bowel sounds are normal.      Palpations: Abdomen is soft.   Neurological:      Mental Status: He is alert and oriented to person, place, and time.   Psychiatric:         Behavior: Behavior normal.         Thought Content: Thought content normal.         Judgment: Judgment normal.       Lab Results   Component Value Date    WBC 7.00 12/06/2022    HGB 15.2 12/06/2022    HCT 44.5 12/06/2022     12/06/2022    CHOL 199 12/06/2022    TRIG 135 12/06/2022    HDL 38 (L) 12/06/2022    ALT 36 12/06/2022    AST 31 12/06/2022     12/06/2022    K 4.6 12/06/2022     12/06/2022    CREATININE 0.9 12/06/2022    BUN 9 12/06/2022    CO2 21 (L) 12/06/2022    TSH 1.459 12/06/2022    INR 1.0 06/14/2021    HGBA1C 4.9 12/06/2022       Assessment:     1. Gastroesophageal reflux disease, unspecified whether esophagitis present    2. Coffee ground emesis         Plan:     Gastroesophageal reflux disease, unspecified whether esophagitis present  -     Ambulatory referral/consult to Gastroenterology; Future; Expected date: 03/07/2023    Coffee ground emesis        Vitals reviewed. BP within normal range at 120/80.    Patient will be referred to GI.  Not actively having emesis//ER if this happens.  Cont PPI 40mg daily  F/u  PRN      Documentation entered by Gio Martines, acting as scribe for Dr. Bebo Conroy. 02/28/2023 8:44 AM.

## 2023-02-28 NOTE — TELEPHONE ENCOUNTER
Pt has an appt scheduled with Ms. Rashid on 5/8/23. Dr. Steve wants to know if pt can be seen sooner for having GERD and coffee ground emesis?

## 2023-02-28 NOTE — TELEPHONE ENCOUNTER
Dr. Steve said, pt is not currently having symptoms.    It is happening sporadically. It's been several weeks since last episode

## 2023-02-28 NOTE — TELEPHONE ENCOUNTER
Notified pt that gastro appt has been rescheduled for Tuesday, March 7th at 9 AM at the [facility] location.

## 2023-03-07 ENCOUNTER — OFFICE VISIT (OUTPATIENT)
Dept: GASTROENTEROLOGY | Facility: CLINIC | Age: 43
End: 2023-03-07
Payer: MEDICAID

## 2023-03-07 VITALS
HEIGHT: 75 IN | BODY MASS INDEX: 30.15 KG/M2 | HEART RATE: 104 BPM | SYSTOLIC BLOOD PRESSURE: 128 MMHG | DIASTOLIC BLOOD PRESSURE: 88 MMHG | WEIGHT: 242.5 LBS | OXYGEN SATURATION: 97 %

## 2023-03-07 DIAGNOSIS — K21.9 GASTROESOPHAGEAL REFLUX DISEASE, UNSPECIFIED WHETHER ESOPHAGITIS PRESENT: ICD-10-CM

## 2023-03-07 DIAGNOSIS — Z87.19 HISTORY OF ESOPHAGITIS: Primary | ICD-10-CM

## 2023-03-07 DIAGNOSIS — Z80.0 FAMILY HISTORY OF COLON CANCER IN MOTHER: ICD-10-CM

## 2023-03-07 DIAGNOSIS — R09.89 CHRONIC THROAT CLEARING: ICD-10-CM

## 2023-03-07 DIAGNOSIS — R11.2 NAUSEA AND VOMITING, UNSPECIFIED VOMITING TYPE: ICD-10-CM

## 2023-03-07 PROCEDURE — 99999 PR PBB SHADOW E&M-EST. PATIENT-LVL IV: ICD-10-PCS | Mod: PBBFAC,,, | Performed by: PHYSICIAN ASSISTANT

## 2023-03-07 PROCEDURE — 1159F PR MEDICATION LIST DOCUMENTED IN MEDICAL RECORD: ICD-10-PCS | Mod: CPTII,,, | Performed by: PHYSICIAN ASSISTANT

## 2023-03-07 PROCEDURE — 3074F PR MOST RECENT SYSTOLIC BLOOD PRESSURE < 130 MM HG: ICD-10-PCS | Mod: CPTII,,, | Performed by: PHYSICIAN ASSISTANT

## 2023-03-07 PROCEDURE — 3074F SYST BP LT 130 MM HG: CPT | Mod: CPTII,,, | Performed by: PHYSICIAN ASSISTANT

## 2023-03-07 PROCEDURE — 99204 PR OFFICE/OUTPT VISIT, NEW, LEVL IV, 45-59 MIN: ICD-10-PCS | Mod: S$PBB,,, | Performed by: PHYSICIAN ASSISTANT

## 2023-03-07 PROCEDURE — 3008F PR BODY MASS INDEX (BMI) DOCUMENTED: ICD-10-PCS | Mod: CPTII,,, | Performed by: PHYSICIAN ASSISTANT

## 2023-03-07 PROCEDURE — 99204 OFFICE O/P NEW MOD 45 MIN: CPT | Mod: S$PBB,,, | Performed by: PHYSICIAN ASSISTANT

## 2023-03-07 PROCEDURE — 3079F PR MOST RECENT DIASTOLIC BLOOD PRESSURE 80-89 MM HG: ICD-10-PCS | Mod: CPTII,,, | Performed by: PHYSICIAN ASSISTANT

## 2023-03-07 PROCEDURE — 3008F BODY MASS INDEX DOCD: CPT | Mod: CPTII,,, | Performed by: PHYSICIAN ASSISTANT

## 2023-03-07 PROCEDURE — 3079F DIAST BP 80-89 MM HG: CPT | Mod: CPTII,,, | Performed by: PHYSICIAN ASSISTANT

## 2023-03-07 PROCEDURE — 1159F MED LIST DOCD IN RCRD: CPT | Mod: CPTII,,, | Performed by: PHYSICIAN ASSISTANT

## 2023-03-07 PROCEDURE — 99214 OFFICE O/P EST MOD 30 MIN: CPT | Mod: PBBFAC | Performed by: PHYSICIAN ASSISTANT

## 2023-03-07 PROCEDURE — 99999 PR PBB SHADOW E&M-EST. PATIENT-LVL IV: CPT | Mod: PBBFAC,,, | Performed by: PHYSICIAN ASSISTANT

## 2023-03-07 RX ORDER — POLYETHYLENE GLYCOL 3350, SODIUM SULFATE ANHYDROUS, SODIUM BICARBONATE, SODIUM CHLORIDE, POTASSIUM CHLORIDE 236; 22.74; 6.74; 5.86; 2.97 G/4L; G/4L; G/4L; G/4L; G/4L
4 POWDER, FOR SOLUTION ORAL ONCE
Qty: 4000 ML | Refills: 0 | Status: SHIPPED | OUTPATIENT
Start: 2023-03-07 | End: 2023-03-07

## 2023-03-07 NOTE — H&P (VIEW-ONLY)
Clinic Consult:  [facility] Consultation Note    Reason for Consult:  The primary encounter diagnosis was History of esophagitis. Diagnoses of Gastroesophageal reflux disease, unspecified whether esophagitis present, Family history of colon cancer in mother, Nausea and vomiting, unspecified vomiting type, and Chronic throat clearing were also pertinent to this visit.    PCP: Bebo Steve   No address on file    CC: Dysphagia and Gastroesophageal Reflux      HPI:  This is a 42 y.o. male here for evaluation of the above. Reports he has struggled for years with chronic nasal dripping and excessive mucus. Has had 4 sinus surgeries, has been on multiple antibiotics and steroids with no real benefit. ENT informed him that his symptoms could be reflux related. He has had had acid reflux for the past 5-6 years. Feels acid reflux very often. Has chronic nausea and vomiting of clear liquid. Can vomit coffee ground material - none recently. No anemia on labs. Takes Protonix 40mg daily with no benefit. Had an EGD back in 2018 with Grade C esophagitis and H. Pylori. Last colonoscopy 2018 given mother with history of CRC in her 40s. Due this year for repeat scope. Denies blood in the stool or abdominal pain.         Review of Systems   Constitutional:  Negative for activity change, appetite change, chills, diaphoresis, fatigue and fever.   HENT:  Positive for postnasal drip. Negative for sore throat, trouble swallowing and voice change.    Respiratory:  Positive for cough and choking. Negative for shortness of breath.    Cardiovascular:  Negative for chest pain.   Gastrointestinal:  Positive for nausea and vomiting. Negative for abdominal distention, abdominal pain, anal bleeding, blood in stool, constipation and diarrhea.   Genitourinary:  Negative for dysuria and hematuria.   Musculoskeletal:  Negative for back pain.   Skin:  Negative for color change and pallor.   Neurological:  Negative for dizziness,  weakness and light-headedness.   Psychiatric/Behavioral:  Negative for dysphoric mood. The patient is not nervous/anxious.       Medical History:   Past Medical History:   Diagnosis Date    ADD (attention deficit disorder)     Dr. Aliya Sparrow    GERD (gastroesophageal reflux disease)        Surgical History:   Past Surgical History:   Procedure Laterality Date    ADENOIDECTOMY      CAUTERY OF TURBINATES Bilateral 2018    Procedure: CAUTERIZATION, MUCOSA OF NASAL TURBINATE;  Surgeon: Rocky Garcia MD;  Location: [facility] OR;  Service: ENT;  Laterality: Bilateral;    COLONOSCOPY N/A 2018    Procedure: COLONOSCOPY;  Surgeon: Clifton Grande III, MD;  Location: [facility] ENDO;  Service: Endoscopy;  Laterality: N/A;    ESOPHAGOGASTRODUODENOSCOPY N/A 2018    Procedure: EGD (ESOPHAGOGASTRODUODENOSCOPY);  Surgeon: Clifton Grande III, MD;  Location: [facility] ENDO;  Service: Endoscopy;  Laterality: N/A;    NASAL SEPTOPLASTY N/A 2018    Procedure: SEPTOPLASTY, NASAL;  Surgeon: Rocky Garcia MD;  Location: [facility] OR;  Service: ENT;  Laterality: N/A;    NASAL STENOSIS REPAIR Bilateral 2018    Procedure: REPAIR, STENOSIS, NOSE, VESTIBULE;  Surgeon: Rocky Garcia MD;  Location: [facility] OR;  Service: ENT;  Laterality: Bilateral;    SINUS SURGERY Bilateral 05/15/2019    REPAIR VESTIBULAR STENOSIS; SEPTOPLASTY; SUBMUCOSAL RESECTION- 50; ANTROSTOMY C ROT- 50; ETHMOIDECTOMY: ANTERIOR- 50; TURBINATE FRACTURE- 50.    SINUS SURGERY  2021    Image guided B total ethmoidectomy, B maxillary antrostomy c ROT, B sphenoidotomy c ROT, B frontal sinusotomy    TONSILLECTOMY      TYMPANOSTOMY TUBE PLACEMENT      x 2       Family History:    Family History   Problem Relation Age of Onset    Colon cancer Mother          at 47    Cancer Maternal Grandmother         unsure of type    Alcohol abuse Maternal Grandfather         Cancer unknown type       Social History:   Social History     Socioeconomic History    Marital status:   "  Occupational History    Occupation: [employer]   Tobacco Use    Smoking status: Never    Smokeless tobacco: Current     Types: Chew    Tobacco comments:     Smoker for maybe a year   Substance and Sexual Activity    Alcohol use: Yes    Drug use: No    Sexual activity: Yes     Partners: Female       Allergies:   Review of patient's allergies indicates:   Allergen Reactions    Ceftin [cefuroxime axetil] Nausea Only    Fluconazole Other (See Comments)    Hydrocodone-acetaminophen Nausea Only and Other (See Comments)       Home Medications:   Current Outpatient Medications on File Prior to Visit   Medication Sig Dispense Refill    lisdexamfetamine (VYVANSE) 70 MG capsule Take 70 mg by mouth every morning.      lisdexamfetamine dimesylate (VYVANSE ORAL) 50 mg.      pantoprazole (PROTONIX) 40 MG tablet Take 40 mg by mouth once daily.       No current facility-administered medications on file prior to visit.       Physical Exam:  /88   Pulse 104   Ht 6' 3" (1.905 m)   Wt 110 kg (242 lb 8.1 oz)   SpO2 97%   BMI 30.31 kg/m²   Body mass index is 30.31 kg/m².  Physical Exam  Constitutional:       General: He is not in acute distress.     Appearance: Normal appearance. He is not ill-appearing, toxic-appearing or diaphoretic.   HENT:      Head: Normocephalic and atraumatic.   Eyes:      General: No scleral icterus.     Extraocular Movements: Extraocular movements intact.   Cardiovascular:      Rate and Rhythm: Normal rate and regular rhythm.   Pulmonary:      Effort: Pulmonary effort is normal. No respiratory distress.      Breath sounds: Normal breath sounds.   Abdominal:      General: Bowel sounds are normal. There is no distension.      Palpations: Abdomen is soft. There is no mass.      Tenderness: There is no abdominal tenderness. There is no guarding.   Musculoskeletal:         General: Normal range of motion.      Cervical back: Normal range of motion.   Skin:     General: Skin is warm and dry.      " Coloration: Skin is not jaundiced or pale.   Neurological:      Mental Status: He is alert and oriented to person, place, and time.         Labs: Pertinent labs reviewed.  Endoscopy: 2018  CRC Screenin  Anticoagulation: none    Assessment:  1. History of esophagitis    2. Gastroesophageal reflux disease, unspecified whether esophagitis present    3. Family history of colon cancer in mother    4. Nausea and vomiting, unspecified vomiting type    5. Chronic throat clearing         Recommendations:  -Schedule for EGD and colonoscopy. EGD given history of Grade C esophagitis, worsening reflux symptoms, nausea/vomiting, chronic nasal drip. Colonoscopy due to family history of CRC in mother.   -Continue Protonix. Will likely need to increase to BID on a regular basis. Reports his does this often now with no improvement.   -discussed possibility of follow up with general surgery once we have EGD results.   -GERD diet    History of esophagitis  -     Case Request Endoscopy: COLONOSCOPY, EGD (ESOPHAGOGASTRODUODENOSCOPY)    Gastroesophageal reflux disease, unspecified whether esophagitis present  -     Ambulatory referral/consult to Gastroenterology  -     Case Request Endoscopy: COLONOSCOPY, EGD (ESOPHAGOGASTRODUODENOSCOPY)  -     Ambulatory referral/consult to Endo Procedure ; Future; Expected date: 2023    Family history of colon cancer in mother  -     Case Request Endoscopy: COLONOSCOPY, EGD (ESOPHAGOGASTRODUODENOSCOPY)  -     Ambulatory referral/consult to Endo Procedure ; Future; Expected date: 2023  -     polyethylene glycol (GOLYTELY) 236-22.74-6.74 -5.86 gram suspension; Take 4,000 mLs (4 L total) by mouth once. for 1 dose  Dispense: 4000 mL; Refill: 0    Nausea and vomiting, unspecified vomiting type  -     Case Request Endoscopy: COLONOSCOPY, EGD (ESOPHAGOGASTRODUODENOSCOPY)  -     Ambulatory referral/consult to Endo Procedure ; Future; Expected date:  03/08/2023    Chronic throat clearing  -     Case Request Endoscopy: COLONOSCOPY, EGD (ESOPHAGOGASTRODUODENOSCOPY)        No follow-ups on file.    Thank you so much for allowing me to participate in the care of Eugenie Rashid PA-C

## 2023-03-07 NOTE — PROGRESS NOTES
Clinic Consult:  [facility] Consultation Note    Reason for Consult:  The primary encounter diagnosis was History of esophagitis. Diagnoses of Gastroesophageal reflux disease, unspecified whether esophagitis present, Family history of colon cancer in mother, Nausea and vomiting, unspecified vomiting type, and Chronic throat clearing were also pertinent to this visit.    PCP: Bebo Steve   No address on file    CC: Dysphagia and Gastroesophageal Reflux      HPI:  This is a 42 y.o. male here for evaluation of the above. Reports he has struggled for years with chronic nasal dripping and excessive mucus. Has had 4 sinus surgeries, has been on multiple antibiotics and steroids with no real benefit. ENT informed him that his symptoms could be reflux related. He has had had acid reflux for the past 5-6 years. Feels acid reflux very often. Has chronic nausea and vomiting of clear liquid. Can vomit coffee ground material - none recently. No anemia on labs. Takes Protonix 40mg daily with no benefit. Had an EGD back in 2018 with Grade C esophagitis and H. Pylori. Last colonoscopy 2018 given mother with history of CRC in her 40s. Due this year for repeat scope. Denies blood in the stool or abdominal pain.         Review of Systems   Constitutional:  Negative for activity change, appetite change, chills, diaphoresis, fatigue and fever.   HENT:  Positive for postnasal drip. Negative for sore throat, trouble swallowing and voice change.    Respiratory:  Positive for cough and choking. Negative for shortness of breath.    Cardiovascular:  Negative for chest pain.   Gastrointestinal:  Positive for nausea and vomiting. Negative for abdominal distention, abdominal pain, anal bleeding, blood in stool, constipation and diarrhea.   Genitourinary:  Negative for dysuria and hematuria.   Musculoskeletal:  Negative for back pain.   Skin:  Negative for color change and pallor.   Neurological:  Negative for dizziness,  weakness and light-headedness.   Psychiatric/Behavioral:  Negative for dysphoric mood. The patient is not nervous/anxious.       Medical History:   Past Medical History:   Diagnosis Date    ADD (attention deficit disorder)     Dr. Aliya Sparrow    GERD (gastroesophageal reflux disease)        Surgical History:   Past Surgical History:   Procedure Laterality Date    ADENOIDECTOMY      CAUTERY OF TURBINATES Bilateral 2018    Procedure: CAUTERIZATION, MUCOSA OF NASAL TURBINATE;  Surgeon: Rocky Garcia MD;  Location: [facility] OR;  Service: ENT;  Laterality: Bilateral;    COLONOSCOPY N/A 2018    Procedure: COLONOSCOPY;  Surgeon: Clifton Grande III, MD;  Location: [facility] ENDO;  Service: Endoscopy;  Laterality: N/A;    ESOPHAGOGASTRODUODENOSCOPY N/A 2018    Procedure: EGD (ESOPHAGOGASTRODUODENOSCOPY);  Surgeon: Clifton Grande III, MD;  Location: [facility] ENDO;  Service: Endoscopy;  Laterality: N/A;    NASAL SEPTOPLASTY N/A 2018    Procedure: SEPTOPLASTY, NASAL;  Surgeon: Rocky Garcia MD;  Location: [facility] OR;  Service: ENT;  Laterality: N/A;    NASAL STENOSIS REPAIR Bilateral 2018    Procedure: REPAIR, STENOSIS, NOSE, VESTIBULE;  Surgeon: Rocky Garcia MD;  Location: [facility] OR;  Service: ENT;  Laterality: Bilateral;    SINUS SURGERY Bilateral 05/15/2019    REPAIR VESTIBULAR STENOSIS; SEPTOPLASTY; SUBMUCOSAL RESECTION- 50; ANTROSTOMY C ROT- 50; ETHMOIDECTOMY: ANTERIOR- 50; TURBINATE FRACTURE- 50.    SINUS SURGERY  2021    Image guided B total ethmoidectomy, B maxillary antrostomy c ROT, B sphenoidotomy c ROT, B frontal sinusotomy    TONSILLECTOMY      TYMPANOSTOMY TUBE PLACEMENT      x 2       Family History:    Family History   Problem Relation Age of Onset    Colon cancer Mother          at 47    Cancer Maternal Grandmother         unsure of type    Alcohol abuse Maternal Grandfather         Cancer unknown type       Social History:   Social History     Socioeconomic History    Marital status:   "  Occupational History    Occupation: [employer]   Tobacco Use    Smoking status: Never    Smokeless tobacco: Current     Types: Chew    Tobacco comments:     Smoker for maybe a year   Substance and Sexual Activity    Alcohol use: Yes    Drug use: No    Sexual activity: Yes     Partners: Female       Allergies:   Review of patient's allergies indicates:   Allergen Reactions    Ceftin [cefuroxime axetil] Nausea Only    Fluconazole Other (See Comments)    Hydrocodone-acetaminophen Nausea Only and Other (See Comments)       Home Medications:   Current Outpatient Medications on File Prior to Visit   Medication Sig Dispense Refill    lisdexamfetamine (VYVANSE) 70 MG capsule Take 70 mg by mouth every morning.      lisdexamfetamine dimesylate (VYVANSE ORAL) 50 mg.      pantoprazole (PROTONIX) 40 MG tablet Take 40 mg by mouth once daily.       No current facility-administered medications on file prior to visit.       Physical Exam:  /88   Pulse 104   Ht 6' 3" (1.905 m)   Wt 110 kg (242 lb 8.1 oz)   SpO2 97%   BMI 30.31 kg/m²   Body mass index is 30.31 kg/m².  Physical Exam  Constitutional:       General: He is not in acute distress.     Appearance: Normal appearance. He is not ill-appearing, toxic-appearing or diaphoretic.   HENT:      Head: Normocephalic and atraumatic.   Eyes:      General: No scleral icterus.     Extraocular Movements: Extraocular movements intact.   Cardiovascular:      Rate and Rhythm: Normal rate and regular rhythm.   Pulmonary:      Effort: Pulmonary effort is normal. No respiratory distress.      Breath sounds: Normal breath sounds.   Abdominal:      General: Bowel sounds are normal. There is no distension.      Palpations: Abdomen is soft. There is no mass.      Tenderness: There is no abdominal tenderness. There is no guarding.   Musculoskeletal:         General: Normal range of motion.      Cervical back: Normal range of motion.   Skin:     General: Skin is warm and dry.      " Coloration: Skin is not jaundiced or pale.   Neurological:      Mental Status: He is alert and oriented to person, place, and time.         Labs: Pertinent labs reviewed.  Endoscopy: 2018  CRC Screenin  Anticoagulation: none    Assessment:  1. History of esophagitis    2. Gastroesophageal reflux disease, unspecified whether esophagitis present    3. Family history of colon cancer in mother    4. Nausea and vomiting, unspecified vomiting type    5. Chronic throat clearing         Recommendations:  -Schedule for EGD and colonoscopy. EGD given history of Grade C esophagitis, worsening reflux symptoms, nausea/vomiting, chronic nasal drip. Colonoscopy due to family history of CRC in mother.   -Continue Protonix. Will likely need to increase to BID on a regular basis. Reports his does this often now with no improvement.   -discussed possibility of follow up with general surgery once we have EGD results.   -GERD diet    History of esophagitis  -     Case Request Endoscopy: COLONOSCOPY, EGD (ESOPHAGOGASTRODUODENOSCOPY)    Gastroesophageal reflux disease, unspecified whether esophagitis present  -     Ambulatory referral/consult to Gastroenterology  -     Case Request Endoscopy: COLONOSCOPY, EGD (ESOPHAGOGASTRODUODENOSCOPY)  -     Ambulatory referral/consult to Endo Procedure ; Future; Expected date: 2023    Family history of colon cancer in mother  -     Case Request Endoscopy: COLONOSCOPY, EGD (ESOPHAGOGASTRODUODENOSCOPY)  -     Ambulatory referral/consult to Endo Procedure ; Future; Expected date: 2023  -     polyethylene glycol (GOLYTELY) 236-22.74-6.74 -5.86 gram suspension; Take 4,000 mLs (4 L total) by mouth once. for 1 dose  Dispense: 4000 mL; Refill: 0    Nausea and vomiting, unspecified vomiting type  -     Case Request Endoscopy: COLONOSCOPY, EGD (ESOPHAGOGASTRODUODENOSCOPY)  -     Ambulatory referral/consult to Endo Procedure ; Future; Expected date:  03/08/2023    Chronic throat clearing  -     Case Request Endoscopy: COLONOSCOPY, EGD (ESOPHAGOGASTRODUODENOSCOPY)        No follow-ups on file.    Thank you so much for allowing me to participate in the care of Eugenie Rashid PA-C

## 2023-03-07 NOTE — LETTER
March 7, 2023      [facility]  [id number] [place] [address]  [place] [address]  Phone: [phone]  Fax: [phone]       Patient: Eugenie Bennett   YOB: 1980  Date of Visit: 03/07/2023    To Whom It May Concern:    Guillermina Bennett  was at [facility] on 03/07/2023. The patient may return to work/school on 3/8/23 with no restrictions. If you have any questions or concerns, or if I can be of further assistance, please do not hesitate to contact me.    Sincerely,    Candie Rashid PA-C

## 2023-03-14 ENCOUNTER — HOSPITAL ENCOUNTER (OUTPATIENT)
Dept: PREADMISSION TESTING | Facility: HOSPITAL | Age: 43
Discharge: HOME | End: 2023-03-14
Attending: COLON & RECTAL SURGERY
Payer: COMMERCIAL

## 2023-03-14 DIAGNOSIS — R11.2 NAUSEA AND VOMITING, UNSPECIFIED VOMITING TYPE: ICD-10-CM

## 2023-03-14 DIAGNOSIS — K21.9 GASTROESOPHAGEAL REFLUX DISEASE, UNSPECIFIED WHETHER ESOPHAGITIS PRESENT: ICD-10-CM

## 2023-03-14 DIAGNOSIS — Z80.0 FAMILY HISTORY OF COLON CANCER IN MOTHER: ICD-10-CM

## 2023-03-20 ENCOUNTER — ANESTHESIA EVENT (OUTPATIENT)
Dept: ENDOSCOPY | Facility: HOSPITAL | Age: 43
End: 2023-03-20
Payer: MEDICAID

## 2023-03-20 ENCOUNTER — ANESTHESIA (OUTPATIENT)
Dept: ENDOSCOPY | Facility: HOSPITAL | Age: 43
End: 2023-03-20
Payer: MEDICAID

## 2023-03-20 ENCOUNTER — HOSPITAL ENCOUNTER (OUTPATIENT)
Facility: HOSPITAL | Age: 43
Discharge: HOME | End: 2023-03-20
Attending: INTERNAL MEDICINE | Admitting: INTERNAL MEDICINE
Payer: MEDICAID

## 2023-03-20 DIAGNOSIS — K21.9 GASTROESOPHAGEAL REFLUX DISEASE WITHOUT ESOPHAGITIS: Primary | ICD-10-CM

## 2023-03-20 DIAGNOSIS — Z80.0 FH: COLON CANCER IN RELATIVE <50 YEARS OLD: ICD-10-CM

## 2023-03-20 PROCEDURE — 88342 IMHCHEM/IMCYTCHM 1ST ANTB: CPT | Mod: 26,,, | Performed by: PATHOLOGY

## 2023-03-20 PROCEDURE — 43239 EGD BIOPSY SINGLE/MULTIPLE: CPT | Mod: 51,,, | Performed by: INTERNAL MEDICINE

## 2023-03-20 PROCEDURE — 88305 TISSUE EXAM BY PATHOLOGIST: ICD-10-PCS | Mod: 26,,, | Performed by: PATHOLOGY

## 2023-03-20 PROCEDURE — 45378 DIAGNOSTIC COLONOSCOPY: CPT | Mod: ,,, | Performed by: INTERNAL MEDICINE

## 2023-03-20 PROCEDURE — 63600175 PHARM REV CODE 636 W HCPCS: Performed by: FAMILY MEDICINE

## 2023-03-20 PROCEDURE — 37000009 HC ANESTHESIA EA ADD 15 MINS: Performed by: INTERNAL MEDICINE

## 2023-03-20 PROCEDURE — 88305 TISSUE EXAM BY PATHOLOGIST: CPT | Mod: 26,,, | Performed by: PATHOLOGY

## 2023-03-20 PROCEDURE — 45378 PR COLONOSCOPY,DIAGNOSTIC: ICD-10-PCS | Mod: ,,, | Performed by: INTERNAL MEDICINE

## 2023-03-20 PROCEDURE — 37000008 HC ANESTHESIA 1ST 15 MINUTES: Performed by: INTERNAL MEDICINE

## 2023-03-20 PROCEDURE — 00813 ANES UPR LWR GI NDSC PX: CPT | Performed by: INTERNAL MEDICINE

## 2023-03-20 PROCEDURE — 25000003 PHARM REV CODE 250: Performed by: INTERNAL MEDICINE

## 2023-03-20 PROCEDURE — 88305 TISSUE EXAM BY PATHOLOGIST: CPT | Performed by: PATHOLOGY

## 2023-03-20 PROCEDURE — 43239 PR EGD, FLEX, W/BIOPSY, SGL/MULTI: ICD-10-PCS | Mod: 51,,, | Performed by: INTERNAL MEDICINE

## 2023-03-20 PROCEDURE — 88342 CHG IMMUNOCYTOCHEMISTRY: ICD-10-PCS | Mod: 26,,, | Performed by: PATHOLOGY

## 2023-03-20 PROCEDURE — 43239 EGD BIOPSY SINGLE/MULTIPLE: CPT | Performed by: INTERNAL MEDICINE

## 2023-03-20 PROCEDURE — G0105 COLORECTAL SCRN; HI RISK IND: HCPCS | Performed by: INTERNAL MEDICINE

## 2023-03-20 PROCEDURE — 25000003 PHARM REV CODE 250: Performed by: FAMILY MEDICINE

## 2023-03-20 PROCEDURE — 27201012 HC FORCEPS, HOT/COLD, DISP: Performed by: INTERNAL MEDICINE

## 2023-03-20 RX ORDER — SODIUM CHLORIDE 9 MG/ML
INJECTION, SOLUTION INTRAVENOUS CONTINUOUS
Status: DISCONTINUED | OUTPATIENT
Start: 2023-03-20 | End: 2023-03-20 | Stop reason: HOSPADM

## 2023-03-20 RX ORDER — PROPOFOL 10 MG/ML
VIAL (ML) INTRAVENOUS
Status: DISCONTINUED | OUTPATIENT
Start: 2023-03-20 | End: 2023-03-20

## 2023-03-20 RX ORDER — PANTOPRAZOLE SODIUM 40 MG/1
40 TABLET, DELAYED RELEASE ORAL 2 TIMES DAILY
Qty: 180 TABLET | Refills: 0 | Status: SHIPPED | OUTPATIENT
Start: 2023-03-20 | End: 2023-04-04 | Stop reason: ALTCHOICE

## 2023-03-20 RX ORDER — LIDOCAINE HYDROCHLORIDE 20 MG/ML
INJECTION, SOLUTION EPIDURAL; INFILTRATION; INTRACAUDAL; PERINEURAL
Status: DISCONTINUED | OUTPATIENT
Start: 2023-03-20 | End: 2023-03-20

## 2023-03-20 RX ORDER — DEXTROMETHORPHAN/PSEUDOEPHED 2.5-7.5/.8
DROPS ORAL
Status: DISCONTINUED | OUTPATIENT
Start: 2023-03-20 | End: 2023-03-20 | Stop reason: HOSPADM

## 2023-03-20 RX ADMIN — SODIUM CHLORIDE, POTASSIUM CHLORIDE, SODIUM LACTATE AND CALCIUM CHLORIDE: 600; 310; 30; 20 INJECTION, SOLUTION INTRAVENOUS at 01:03

## 2023-03-20 RX ADMIN — PROPOFOL 200 MG: 10 INJECTION, EMULSION INTRAVENOUS at 01:03

## 2023-03-20 RX ADMIN — LIDOCAINE HYDROCHLORIDE 50 MG: 20 INJECTION, SOLUTION EPIDURAL; INFILTRATION; INTRACAUDAL; PERINEURAL at 01:03

## 2023-03-20 RX ADMIN — PROPOFOL 50 MG: 10 INJECTION, EMULSION INTRAVENOUS at 01:03

## 2023-03-20 NOTE — PROVATION PATIENT INSTRUCTIONS
Discharge Summary/Instructions after an Endoscopic Procedure  Patient Name: Eugenie Bennett  Patient MRN: [id number]  Patient YOB: 1980 Monday, March 20, 2023 Keena Gresham MD  Dear patient,  As a result of recent federal legislation (The Federal Cures Act), you may   receive lab or pathology results from your procedure in your [facility]   account before your physician is able to contact you. Your physician or   their representative will relay the results to you with their   recommendations at their soonest availability.  Thank you,  RESTRICTIONS:  During your procedure today, you received medications for sedation.  These   medications may affect your judgment, balance and coordination.  Therefore,   for 24 hours, you have the following restrictions:   - DO NOT drive a car, operate machinery, make legal/financial decisions,   sign important papers or drink alcohol.    ACTIVITY:  Today: no heavy lifting, straining or running due to procedural   sedation/anesthesia.  The following day: return to full activity including work.  DIET:  Eat and drink normally unless instructed otherwise.     TREATMENT FOR COMMON SIDE EFFECTS:  - Mild abdominal pain, nausea, belching, bloating or excessive gas:  rest,   eat lightly and use a heating pad.  - Sore Throat: treat with throat lozenges and/or gargle with warm salt   water.  - Because air was used during the procedure, expelling large amounts of air   from your rectum or belching is normal.  - If a bowel prep was taken, you may not have a bowel movement for 1-3 days.    This is normal.  SYMPTOMS TO WATCH FOR AND REPORT TO YOUR PHYSICIAN:  1. Abdominal pain or bloating, other than gas cramps.  2. Chest pain.  3. Back pain.  4. Signs of infection such as: chills or fever occurring within 24 hours   after the procedure.  5. Rectal bleeding, which would show as bright red, maroon, or black stools.   (A tablespoon of blood from the rectum is not serious, especially  if   hemorrhoids are present.)  6. Vomiting.  7. Weakness or dizziness.  GO DIRECTLY TO THE NEAREST EMERGENCY ROOM IF YOU HAVE ANY OF THE FOLLOWING:      Difficulty breathing              Chills and/or fever over 101 F   Persistent vomiting and/or vomiting blood   Severe abdominal pain   Severe chest pain   Black, tarry stools   Bleeding- more than one tablespoon   Any other symptom or condition that you feel may need urgent attention  Your doctor recommends these additional instructions:  If any biopsies were taken, your doctors clinic will contact you in 1 to 2   weeks with any results.  - Discharge patient to home after colonoscopy.   - Use Protonix (pantoprazole) 40 mg PO BID as recently directed by Ms. Candie Rashid PA-C.   - Observe patient's clinical course.   - Await pathology results.   - Proceed with colonoscopy.  For questions, problems or results please call your physician Keena Gresham MD at Work:  [phone]  If you have any questions about the above instructions, call the GI   department at [phone] or call the endoscopy unit at [phone]   from 7am until 3 pm.  [facility] [place], EMERGENCY ROOM PHONE NUMBER:   [phone]  IF A COMPLICATION OR EMERGENCY SITUATION ARISES AND YOU ARE UNABLE TO REACH   YOUR PHYSICIAN - GO DIRECTLY TO THE EMERGENCY ROOM.  I have read or have had read to me these discharge instructions for my   procedure and have received a written copy.  I understand these   instructions and will follow-up with my physician if I have any questions.     __________________________________       _____________________________________  Nurse Signature                                          Patient/Designated   Responsible Party Signature  MD Keena Lovell MD  3/20/2023 2:05:25 PM  This report has been verified and signed electronically.  Dear patient,  As a result of recent federal legislation (The Federal Cures Act), you may    receive lab or pathology results from your procedure in your [facility]   account before your physician is able to contact you. Your physician or   their representative will relay the results to you with their   recommendations at their soonest availability.  Thank you,  PROVATION

## 2023-03-20 NOTE — ANESTHESIA POSTPROCEDURE EVALUATION
Anesthesia Post Evaluation    Patient: Eugenie Bennett    Procedure(s) Performed: Procedure(s) (LRB):  COLONOSCOPY (N/A)  EGD (ESOPHAGOGASTRODUODENOSCOPY) (N/A)    Final Anesthesia Type: MAC      Patient location during evaluation: GI PACU  Patient participation: Yes- Able to Participate  Level of consciousness: awake and alert  Post-procedure vital signs: reviewed and stable  Pain management: adequate  Airway patency: patent    PONV status at discharge: No PONV  Anesthetic complications: no      Cardiovascular status: blood pressure returned to baseline and hemodynamically stable  Respiratory status: unassisted, spontaneous ventilation and room air  Hydration status: euvolemic  Follow-up not needed.          Vitals Value Taken Time   /80 03/20/23 1430   Temp 36.7 °C (98 °F) 03/20/23 1402   Pulse 74 03/20/23 1430   Resp 17 03/20/23 1430   SpO2 100 % 03/20/23 1430         Event Time   Out of Recovery 14:44:37         Pain/Krystal Score: Krystal Score: 10 (3/20/2023  2:30 PM)

## 2023-03-20 NOTE — PLAN OF CARE
Dr Gresham came to bedside and discussed findings. NO N/V,  no abdominal pain, no GI bleeding, and vitals stable.  Pt discharged from unit.

## 2023-03-20 NOTE — PROVATION PATIENT INSTRUCTIONS
Discharge Summary/Instructions after an Endoscopic Procedure  Patient Name: Eugenie Bennett  Patient MRN: [id number]  Patient YOB: 1980 Monday, March 20, 2023 Keena Gresham MD  Dear patient,  As a result of recent federal legislation (The Federal Cures Act), you may   receive lab or pathology results from your procedure in your [facility]   account before your physician is able to contact you. Your physician or   their representative will relay the results to you with their   recommendations at their soonest availability.  Thank you,  RESTRICTIONS:  During your procedure today, you received medications for sedation.  These   medications may affect your judgment, balance and coordination.  Therefore,   for 24 hours, you have the following restrictions:   - DO NOT drive a car, operate machinery, make legal/financial decisions,   sign important papers or drink alcohol.    ACTIVITY:  Today: no heavy lifting, straining or running due to procedural   sedation/anesthesia.  The following day: return to full activity including work.  DIET:  Eat and drink normally unless instructed otherwise.     TREATMENT FOR COMMON SIDE EFFECTS:  - Mild abdominal pain, nausea, belching, bloating or excessive gas:  rest,   eat lightly and use a heating pad.  - Sore Throat: treat with throat lozenges and/or gargle with warm salt   water.  - Because air was used during the procedure, expelling large amounts of air   from your rectum or belching is normal.  - If a bowel prep was taken, you may not have a bowel movement for 1-3 days.    This is normal.  SYMPTOMS TO WATCH FOR AND REPORT TO YOUR PHYSICIAN:  1. Abdominal pain or bloating, other than gas cramps.  2. Chest pain.  3. Back pain.  4. Signs of infection such as: chills or fever occurring within 24 hours   after the procedure.  5. Rectal bleeding, which would show as bright red, maroon, or black stools.   (A tablespoon of blood from the rectum is not serious, especially  if   hemorrhoids are present.)  6. Vomiting.  7. Weakness or dizziness.  GO DIRECTLY TO THE NEAREST EMERGENCY ROOM IF YOU HAVE ANY OF THE FOLLOWING:      Difficulty breathing              Chills and/or fever over 101 F   Persistent vomiting and/or vomiting blood   Severe abdominal pain   Severe chest pain   Black, tarry stools   Bleeding- more than one tablespoon   Any other symptom or condition that you feel may need urgent attention  Your doctor recommends these additional instructions:  If any biopsies were taken, your doctors clinic will contact you in 1 to 2   weeks with any results.  - Discharge patient to home (with escort).   - Resume previous diet.   - Continue present medications.   - Await pathology results.   - Repeat colonoscopy in 5 years for surveillance.   - Return to primary care physician.  For questions, problems or results please call your physician Keena Gresham MD at Work:  [phone]  If you have any questions about the above instructions, call the GI   department at [phone] or call the endoscopy unit at [phone]   from 7am until 3 pm.  [facility] [place] [facility], EMERGENCY ROOM PHONE NUMBER:   [phone]  IF A COMPLICATION OR EMERGENCY SITUATION ARISES AND YOU ARE UNABLE TO REACH   YOUR PHYSICIAN - GO DIRECTLY TO THE EMERGENCY ROOM.  I have read or have had read to me these discharge instructions for my   procedure and have received a written copy.  I understand these   instructions and will follow-up with my physician if I have any questions.     __________________________________       _____________________________________  Nurse Signature                                          Patient/Designated   Responsible Party Signature  MD Keena Lovell MD  3/20/2023 2:08:26 PM  This report has been verified and signed electronically.  Dear patient,  As a result of recent federal legislation (The Federal Cures Act), you may   receive lab or  pathology results from your procedure in your [facility]   account before your physician is able to contact you. Your physician or   their representative will relay the results to you with their   recommendations at their soonest availability.  Thank you,  PROVATION

## 2023-03-20 NOTE — ANESTHESIA PREPROCEDURE EVALUATION
03/20/2023  Eugenie Bennett is a 42 y.o., male.      Pre-op Assessment    I have reviewed the Patient Summary Reports.     I have reviewed the Nursing Notes. I have reviewed the NPO Status.   I have reviewed the Medications.     Review of Systems  Anesthesia Hx:  No problems with previous Anesthesia    Social:  Former Smoker, Alcohol Use Chew tobacco   Hematology/Oncology:  Hematology Normal   Oncology Normal     Pulmonary:   Snore   Hepatic/GI:   Bowel Prep. GERD, poorly controlled    Musculoskeletal:  Musculoskeletal Normal    Neurological:  Neurology Normal    Dermatological:  Skin Normal    Psych:   Psychiatric History anxiety          Physical Exam  General: Well nourished, Cooperative, Alert and Oriented    Airway:  Mallampati: II   Mouth Opening: Normal  TM Distance: Normal  Tongue: Normal  Neck ROM: Normal ROM    Dental:  Intact    Chest/Lungs:  Clear to auscultation    Heart:  Rhythm: Regular Rhythm  Sounds: Normal    Abdomen:  Normal        Anesthesia Plan  Type of Anesthesia, risks & benefits discussed:    Anesthesia Type: MAC  Intra-op Monitoring Plan: Standard ASA Monitors  Induction:  IV  Informed Consent: Informed consent signed with the Patient and all parties understand the risks and agree with anesthesia plan.  All questions answered.   ASA Score: 1  Day of Surgery Review of History & Physical: I have interviewed and examined the patient. I have reviewed the patient's H&P dated:     Ready For Surgery From Anesthesia Perspective.     .

## 2023-03-20 NOTE — TRANSFER OF CARE
Anesthesia Transfer of Care Note    Patient: Eugenie Bennett    Procedure(s) Performed: Procedure(s) (LRB):  COLONOSCOPY (N/A)  EGD (ESOPHAGOGASTRODUODENOSCOPY) (N/A)    Patient location: GI    Anesthesia Type: MAC    Transport from OR: Transported from OR on room air with adequate spontaneous ventilation    Post pain: adequate analgesia    Post assessment: no apparent anesthetic complications    Post vital signs: stable    Level of consciousness: awake and responds to stimulation    Nausea/Vomiting: no nausea/vomiting    Complications: none    Transfer of care protocol was followed      Last vitals:   Visit Vitals  /86 (BP Location: Left arm)   Pulse 97   Temp 37 °C (98.6 °F) (Temporal)   Resp 18   SpO2 98%

## 2023-03-20 NOTE — INTERVAL H&P NOTE
The patient has been examined and the H&P has been reviewed:    I concur with the findings and no changes have occurred since H&P was written.    Anesthesia/Surgery risks, benefits and alternative options discussed and understood by patient/family.      The risks, benefits and alternatives of the procedure were discussed with the patient in detail. This discussion was had in the presence of endoscopy staff. The risks include, risks of adverse reaction to sedation requiring the use of reversal agents, bleeding requiring blood transfusion, perforation requiring surgical intervention and technical failure. Other risks include aspiration leading to respiratory distress and respiratory failure resulting in endotracheal intubation and mechanical ventilation including death. If anesthesia is being utilized for this procedure, it is up to the anesthesiologist to determine airway safety including elective endotracheal intubation. Questions were answered, they agree to proceed. There was no language barriers.

## 2023-03-21 VITALS
TEMPERATURE: 98 F | DIASTOLIC BLOOD PRESSURE: 80 MMHG | RESPIRATION RATE: 17 BRPM | SYSTOLIC BLOOD PRESSURE: 127 MMHG | OXYGEN SATURATION: 100 % | HEART RATE: 74 BPM

## 2023-03-27 LAB
FINAL PATHOLOGIC DIAGNOSIS: NORMAL
Lab: NORMAL

## 2023-03-28 DIAGNOSIS — Z87.19 HISTORY OF ESOPHAGITIS: ICD-10-CM

## 2023-03-28 DIAGNOSIS — K21.9 GASTROESOPHAGEAL REFLUX DISEASE, UNSPECIFIED WHETHER ESOPHAGITIS PRESENT: Primary | ICD-10-CM

## 2023-03-28 DIAGNOSIS — R09.89 CHRONIC THROAT CLEARING: ICD-10-CM

## 2023-03-28 NOTE — PROGRESS NOTES
The biopsies of your stomach show no signs of infection. The esophagus, stomach and small bowel were healthy appearing and normal. Since there is no signs of acid injury to the esophagus but you are having symptoms, we recommend a pH study. Our offices will contact you to schedule this study. Please follow up with Ms. Candie Rashid PA-C in the GI clinic after the pH study has been completed.

## 2023-03-31 ENCOUNTER — PATIENT MESSAGE (OUTPATIENT)
Dept: ENDOSCOPY | Facility: HOSPITAL | Age: 43
End: 2023-03-31
Payer: COMMERCIAL

## 2023-03-31 ENCOUNTER — PATIENT MESSAGE (OUTPATIENT)
Dept: GASTROENTEROLOGY | Facility: CLINIC | Age: 43
End: 2023-03-31
Payer: COMMERCIAL

## 2023-04-04 DIAGNOSIS — Z87.19 HISTORY OF ESOPHAGITIS: ICD-10-CM

## 2023-04-04 DIAGNOSIS — R11.2 NAUSEA AND VOMITING, UNSPECIFIED VOMITING TYPE: ICD-10-CM

## 2023-04-04 DIAGNOSIS — R09.89 CHRONIC THROAT CLEARING: ICD-10-CM

## 2023-04-04 DIAGNOSIS — K21.9 GASTROESOPHAGEAL REFLUX DISEASE, UNSPECIFIED WHETHER ESOPHAGITIS PRESENT: Primary | ICD-10-CM

## 2023-04-04 RX ORDER — ESOMEPRAZOLE MAGNESIUM 40 MG/1
40 CAPSULE, DELAYED RELEASE ORAL 2 TIMES DAILY
Qty: 60 CAPSULE | Refills: 1 | Status: SHIPPED | OUTPATIENT
Start: 2023-04-04 | End: 2023-08-15 | Stop reason: SDUPTHER

## 2023-05-01 ENCOUNTER — HOSPITAL ENCOUNTER (OUTPATIENT)
Facility: HOSPITAL | Age: 43
Discharge: HOME | End: 2023-05-01
Attending: INTERNAL MEDICINE | Admitting: INTERNAL MEDICINE
Payer: COMMERCIAL

## 2023-05-01 VITALS
SYSTOLIC BLOOD PRESSURE: 125 MMHG | TEMPERATURE: 98 F | HEIGHT: 75 IN | OXYGEN SATURATION: 98 % | WEIGHT: 240 LBS | BODY MASS INDEX: 29.84 KG/M2 | DIASTOLIC BLOOD PRESSURE: 79 MMHG | RESPIRATION RATE: 18 BRPM | HEART RATE: 84 BPM

## 2023-05-01 PROCEDURE — 25000003 PHARM REV CODE 250: Performed by: INTERNAL MEDICINE

## 2023-05-01 PROCEDURE — 91010 ESOPHAGUS MOTILITY STUDY: CPT | Mod: TC | Performed by: INTERNAL MEDICINE

## 2023-05-01 PROCEDURE — 91037 ESOPH IMPED FUNCTION TEST: CPT | Mod: TC | Performed by: INTERNAL MEDICINE

## 2023-05-01 RX ORDER — LIDOCAINE HYDROCHLORIDE 20 MG/ML
2 SOLUTION OROPHARYNGEAL ONCE
Status: COMPLETED | OUTPATIENT
Start: 2023-05-01 | End: 2023-05-01

## 2023-05-01 RX ADMIN — LIDOCAINE HYDROCHLORIDE 2 ML: 20 SOLUTION ORAL at 11:05

## 2023-05-01 NOTE — PLAN OF CARE
Manometry performed without difficulty. 24hr pH catheter placed. No shortness of breath, difficulty swallowing, or nasal bleeding noted post-procedure. Patient verbalized understanding of discharge instructions. Patient to return in 24 hours in order to deliver pH monitor and diary. Discharged to home without difficulty.

## 2023-05-01 NOTE — PLAN OF CARE
Manometry performed without difficulty. No shortness of breath, difficulty swallowing, or nasal bleeding noted post-procedure. Patient verbalized understanding of discharge instructions. Discharged to home without difficulty.

## 2023-05-09 PROCEDURE — 91010 ESOPHAGUS MOTILITY STUDY: CPT | Mod: 26,,, | Performed by: INTERNAL MEDICINE

## 2023-05-09 PROCEDURE — 91010 PR ESOPHAGEAL MOTILITY STUDY, MA2METRY: ICD-10-PCS | Mod: 26,,, | Performed by: INTERNAL MEDICINE

## 2023-05-09 PROCEDURE — 91037 ESOPH IMPED FUNCTION TEST: CPT | Mod: 26,,, | Performed by: INTERNAL MEDICINE

## 2023-05-09 PROCEDURE — 91037 PR GERD TST W/ NASAL IMPEDENCE ELECTROD: ICD-10-PCS | Mod: 26,,, | Performed by: INTERNAL MEDICINE

## 2023-05-09 NOTE — PROVATION PATIENT INSTRUCTIONS
Discharge Summary/Instructions after an Endoscopic Procedure  Patient Name: Eugenie Bennett  Patient MRN: [id number]  Patient YOB: 1980  Monday, May 1, 2023 Sommer Curiel MD  Dear patient,  As a result of recent federal legislation (The Federal Cures Act), you may   receive lab or pathology results from your procedure in your [facility]   account before your physician is able to contact you. Your physician or   their representative will relay the results to you with their   recommendations at their soonest availability.  Thank you,  RESTRICTIONS:  During your procedure today, you received medications for sedation.  These   medications may affect your judgment, balance and coordination.  Therefore,   for 24 hours, you have the following restrictions:   - DO NOT drive a car, operate machinery, make legal/financial decisions,   sign important papers or drink alcohol.    ACTIVITY:  Today: no heavy lifting, straining or running due to procedural   sedation/anesthesia.  The following day: return to full activity including work.  DIET:  Eat and drink normally unless instructed otherwise.     TREATMENT FOR COMMON SIDE EFFECTS:  - Mild abdominal pain, nausea, belching, bloating or excessive gas:  rest,   eat lightly and use a heating pad.  - Sore Throat: treat with throat lozenges and/or gargle with warm salt   water.  - Because air was used during the procedure, expelling large amounts of air   from your rectum or belching is normal.  - If a bowel prep was taken, you may not have a bowel movement for 1-3 days.    This is normal.  SYMPTOMS TO WATCH FOR AND REPORT TO YOUR PHYSICIAN:  1. Abdominal pain or bloating, other than gas cramps.  2. Chest pain.  3. Back pain.  4. Signs of infection such as: chills or fever occurring within 24 hours   after the procedure.  5. Rectal bleeding, which would show as bright red, maroon, or black stools.   (A tablespoon of blood from the rectum is not serious, especially  if   hemorrhoids are present.)  6. Vomiting.  7. Weakness or dizziness.  GO DIRECTLY TO THE NEAREST EMERGENCY ROOM IF YOU HAVE ANY OF THE FOLLOWING:      Difficulty breathing              Chills and/or fever over 101 F   Persistent vomiting and/or vomiting blood   Severe abdominal pain   Severe chest pain   Black, tarry stools   Bleeding- more than one tablespoon   Any other symptom or condition that you feel may need urgent attention  Your doctor recommends these additional instructions:  If any biopsies were taken, your doctors clinic will contact you in 1 to 2   weeks with any results.  - Continue present medications.  For questions, problems or results please call your physician Sommer Curiel MD at Work:  [phone]  If you have any questions about the above instructions, call the GI   department at [phone] or call the endoscopy unit at [phone]   from 7am until 3 pm.  [facility] [place] [facility], EMERGENCY ROOM PHONE NUMBER:   [phone]  IF A COMPLICATION OR EMERGENCY SITUATION ARISES AND YOU ARE UNABLE TO REACH   YOUR PHYSICIAN - GO DIRECTLY TO THE EMERGENCY ROOM.  I have read or have had read to me these discharge instructions for my   procedure and have received a written copy.  I understand these   instructions and will follow-up with my physician if I have any questions.     __________________________________       _____________________________________  Nurse Signature                                          Patient/Designated   Responsible Party Signature  MD Sommer Mobley MD  5/9/2023 8:32:42 AM  [employer]

## 2023-05-17 NOTE — PROVATION PATIENT INSTRUCTIONS
Discharge Summary/Instructions after an Endoscopic Procedure  Patient Name: Eugenie Bennett  Patient MRN: [id number]  Patient YOB: 1980  Monday, May 1, 2023 Denise Ross MD  Dear patient,  As a result of recent federal legislation (The Federal Cures Act), you may   receive lab or pathology results from your procedure in your [facility]   account before your physician is able to contact you. Your physician or   their representative will relay the results to you with their   recommendations at their soonest availability.  Thank you,  RESTRICTIONS:  During your procedure today, you received medications for sedation.  These   medications may affect your judgment, balance and coordination.  Therefore,   for 24 hours, you have the following restrictions:   - DO NOT drive a car, operate machinery, make legal/financial decisions,   sign important papers or drink alcohol.    ACTIVITY:  Today: no heavy lifting, straining or running due to procedural   sedation/anesthesia.  The following day: return to full activity including work.  DIET:  Eat and drink normally unless instructed otherwise.     TREATMENT FOR COMMON SIDE EFFECTS:  - Mild abdominal pain, nausea, belching, bloating or excessive gas:  rest,   eat lightly and use a heating pad.  - Sore Throat: treat with throat lozenges and/or gargle with warm salt   water.  - Because air was used during the procedure, expelling large amounts of air   from your rectum or belching is normal.  - If a bowel prep was taken, you may not have a bowel movement for 1-3 days.    This is normal.  SYMPTOMS TO WATCH FOR AND REPORT TO YOUR PHYSICIAN:  1. Abdominal pain or bloating, other than gas cramps.  2. Chest pain.  3. Back pain.  4. Signs of infection such as: chills or fever occurring within 24 hours   after the procedure.  5. Rectal bleeding, which would show as bright red, maroon, or black stools.   (A tablespoon of blood from the rectum is not serious, especially  if   hemorrhoids are present.)  6. Vomiting.  7. Weakness or dizziness.  GO DIRECTLY TO THE NEAREST EMERGENCY ROOM IF YOU HAVE ANY OF THE FOLLOWING:      Difficulty breathing              Chills and/or fever over 101 F   Persistent vomiting and/or vomiting blood   Severe abdominal pain   Severe chest pain   Black, tarry stools   Bleeding- more than one tablespoon   Any other symptom or condition that you feel may need urgent attention  Your doctor recommends these additional instructions:  If any biopsies were taken, your doctors clinic will contact you in 1 to 2   weeks with any results.  - Discharge patient to home.   - Optimize anti-reflux regimen.    - Return to referring physician.  For questions, problems or results please call your physician Denise Ross MD at Work:  [phone]  If you have any questions about the above instructions, call the GI   department at [phone] or call the endoscopy unit at [phone]   from 7am until 3 pm.  [facility] [place], EMERGENCY ROOM PHONE NUMBER:   [phone]  IF A COMPLICATION OR EMERGENCY SITUATION ARISES AND YOU ARE UNABLE TO REACH   YOUR PHYSICIAN - GO DIRECTLY TO THE EMERGENCY ROOM.  I have read or have had read to me these discharge instructions for my   procedure and have received a written copy.  I understand these   instructions and will follow-up with my physician if I have any questions.     __________________________________       _____________________________________  Nurse Signature                                          Patient/Designated   Responsible Party Signature  Denise Ross MD  5/17/2023 2:39:44 PM  This report has been verified and signed electronically.  Dear patient,  As a result of recent federal legislation (The Federal Cures Act), you may   receive lab or pathology results from your procedure in your [facility]   account before your physician is able to contact you. Your physician or   their  representative will relay the results to you with their   recommendations at their soonest availability.  Thank you,  [employer]OVATION

## 2023-08-15 DIAGNOSIS — R11.2 NAUSEA AND VOMITING, UNSPECIFIED VOMITING TYPE: ICD-10-CM

## 2023-08-15 DIAGNOSIS — K21.9 GASTROESOPHAGEAL REFLUX DISEASE, UNSPECIFIED WHETHER ESOPHAGITIS PRESENT: ICD-10-CM

## 2023-08-15 DIAGNOSIS — R09.89 CHRONIC THROAT CLEARING: ICD-10-CM

## 2023-08-15 DIAGNOSIS — Z87.19 HISTORY OF ESOPHAGITIS: ICD-10-CM

## 2023-08-18 RX ORDER — ESOMEPRAZOLE MAGNESIUM 40 MG/1
40 CAPSULE, DELAYED RELEASE ORAL 2 TIMES DAILY
Qty: 60 CAPSULE | Refills: 6 | Status: SHIPPED | OUTPATIENT
Start: 2023-08-18

## 2023-11-29 ENCOUNTER — OFFICE VISIT (OUTPATIENT)
Dept: INTERNAL MEDICINE | Facility: CLINIC | Age: 43
End: 2023-11-29
Payer: COMMERCIAL

## 2023-11-29 ENCOUNTER — PATIENT MESSAGE (OUTPATIENT)
Dept: INTERNAL MEDICINE | Facility: CLINIC | Age: 43
End: 2023-11-29

## 2023-11-29 VITALS
SYSTOLIC BLOOD PRESSURE: 110 MMHG | HEIGHT: 75 IN | DIASTOLIC BLOOD PRESSURE: 80 MMHG | TEMPERATURE: 98 F | WEIGHT: 246.94 LBS | BODY MASS INDEX: 30.7 KG/M2 | HEART RATE: 107 BPM

## 2023-11-29 DIAGNOSIS — Z00.00 ROUTINE GENERAL MEDICAL EXAMINATION AT A HEALTH CARE FACILITY: Primary | ICD-10-CM

## 2023-11-29 PROCEDURE — 99396 PR PREVENTIVE VISIT,EST,40-64: ICD-10-PCS | Mod: S$GLB,,, | Performed by: FAMILY MEDICINE

## 2023-11-29 PROCEDURE — 3008F PR BODY MASS INDEX (BMI) DOCUMENTED: ICD-10-PCS | Mod: CPTII,S$GLB,, | Performed by: FAMILY MEDICINE

## 2023-11-29 PROCEDURE — 3079F DIAST BP 80-89 MM HG: CPT | Mod: CPTII,S$GLB,, | Performed by: FAMILY MEDICINE

## 2023-11-29 PROCEDURE — 3074F SYST BP LT 130 MM HG: CPT | Mod: CPTII,S$GLB,, | Performed by: FAMILY MEDICINE

## 2023-11-29 PROCEDURE — 99999 PR PBB SHADOW E&M-EST. PATIENT-LVL III: ICD-10-PCS | Mod: PBBFAC,,, | Performed by: FAMILY MEDICINE

## 2023-11-29 PROCEDURE — 1159F MED LIST DOCD IN RCRD: CPT | Mod: CPTII,S$GLB,, | Performed by: FAMILY MEDICINE

## 2023-11-29 PROCEDURE — 3079F PR MOST RECENT DIASTOLIC BLOOD PRESSURE 80-89 MM HG: ICD-10-PCS | Mod: CPTII,S$GLB,, | Performed by: FAMILY MEDICINE

## 2023-11-29 PROCEDURE — 1159F PR MEDICATION LIST DOCUMENTED IN MEDICAL RECORD: ICD-10-PCS | Mod: CPTII,S$GLB,, | Performed by: FAMILY MEDICINE

## 2023-11-29 PROCEDURE — 3008F BODY MASS INDEX DOCD: CPT | Mod: CPTII,S$GLB,, | Performed by: FAMILY MEDICINE

## 2023-11-29 PROCEDURE — 99396 PREV VISIT EST AGE 40-64: CPT | Mod: S$GLB,,, | Performed by: FAMILY MEDICINE

## 2023-11-29 PROCEDURE — 99999 PR PBB SHADOW E&M-EST. PATIENT-LVL III: CPT | Mod: PBBFAC,,, | Performed by: FAMILY MEDICINE

## 2023-11-29 PROCEDURE — 1160F PR REVIEW ALL MEDS BY PRESCRIBER/CLIN PHARMACIST DOCUMENTED: ICD-10-PCS | Mod: CPTII,S$GLB,, | Performed by: FAMILY MEDICINE

## 2023-11-29 PROCEDURE — 3074F PR MOST RECENT SYSTOLIC BLOOD PRESSURE < 130 MM HG: ICD-10-PCS | Mod: CPTII,S$GLB,, | Performed by: FAMILY MEDICINE

## 2023-11-29 PROCEDURE — 1160F RVW MEDS BY RX/DR IN RCRD: CPT | Mod: CPTII,S$GLB,, | Performed by: FAMILY MEDICINE

## 2023-11-29 RX ORDER — BUPROPION HYDROCHLORIDE 150 MG/1
150 TABLET ORAL
COMMUNITY
Start: 2023-10-18

## 2023-11-29 RX ORDER — DEXTROAMPHETAMINE SACCHARATE, AMPHETAMINE ASPARTATE MONOHYDRATE, DEXTROAMPHETAMINE SULFATE AND AMPHETAMINE SULFATE 7.5; 7.5; 7.5; 7.5 MG/1; MG/1; MG/1; MG/1
30 CAPSULE, EXTENDED RELEASE ORAL EVERY MORNING
COMMUNITY

## 2023-11-29 NOTE — PROGRESS NOTES
Subjective:      Patient ID: Eugenie Bennett is a 43 y.o. male.    Chief Complaint: Annual Exam    HPI  44 yo here for annual visit.  He is doing well except for his sinus problems.  Followed by Dr. Noland  Sees Dr. Sparrow for ADD meds      Past Medical History:   Diagnosis Date    ADD (attention deficit disorder)     Dr. Aliya Sparrow    GERD (gastroesophageal reflux disease)      Family History   Problem Relation Age of Onset    Colon cancer Mother          at 47    Cancer Maternal Grandmother         unsure of type    Alcohol abuse Maternal Grandfather         Cancer unknown type     Past Surgical History:   Procedure Laterality Date    24 HOUR IMPEDANCE PH MONITORING OF ESOPHAGUS IN PATIENT NOT TAKING ACID REDUCING MEDICATIONS N/A 2023    Procedure: IMPEDANCE PH STUDY, ESOPHAGEAL, 24 HOUR, IN PATIENT NOT TAKING ACID REDUCING MEDICATION;  Surgeon: To Deng RN;  Location: [facility];  Service: Endoscopy;  Laterality: N/A;    ADENOIDECTOMY      CAUTERY OF TURBINATES Bilateral 2018    Procedure: CAUTERIZATION, MUCOSA OF NASAL TURBINATE;  Surgeon: Rocky Garcia MD;  Location: [facility];  Service: ENT;  Laterality: Bilateral;    COLONOSCOPY N/A 2018    Procedure: COLONOSCOPY;  Surgeon: Clifton Grande III, MD;  Location: [facility];  Service: Endoscopy;  Laterality: N/A;    COLONOSCOPY N/A 3/20/2023    Procedure: COLONOSCOPY;  Surgeon: Keena Gresham MD;  Location: [facility];  Service: Endoscopy;  Laterality: N/A;    ESOPHAGEAL MOTILITY STUDY USING HIGH RESOLUTION MANOMETRY N/A 2023    Procedure: MOTILITY STUDY, ESOPHAGUS, USING HIGH RESOLUTION MANOMETRY;  Surgeon: To Deng RN;  Location: [facility];  Service: Endoscopy;  Laterality: N/A;    ESOPHAGOGASTRODUODENOSCOPY N/A 2018    Procedure: EGD (ESOPHAGOGASTRODUODENOSCOPY);  Surgeon: Clifton Grande III, MD;  Location: [facility];  Service: Endoscopy;  Laterality: N/A;    ESOPHAGOGASTRODUODENOSCOPY N/A 3/20/2023    Procedure: EGD  "(ESOPHAGOGASTRODUODENOSCOPY);  Surgeon: Keena Gresham MD;  Location: [facility] ENDO;  Service: Endoscopy;  Laterality: N/A;    NASAL SEPTOPLASTY N/A 8/8/2018    Procedure: SEPTOPLASTY, NASAL;  Surgeon: Rocky Garcia MD;  Location: [facility] OR;  Service: ENT;  Laterality: N/A;    NASAL STENOSIS REPAIR Bilateral 8/8/2018    Procedure: REPAIR, STENOSIS, NOSE, VESTIBULE;  Surgeon: Rocky Garcia MD;  Location: [facility] OR;  Service: ENT;  Laterality: Bilateral;    SINUS SURGERY Bilateral 05/15/2019    REPAIR VESTIBULAR STENOSIS; SEPTOPLASTY; SUBMUCOSAL RESECTION- 50; ANTROSTOMY C ROT- 50; ETHMOIDECTOMY: ANTERIOR- 50; TURBINATE FRACTURE- 50.    SINUS SURGERY  06/28/2021    Image guided B total ethmoidectomy, B maxillary antrostomy c ROT, B sphenoidotomy c ROT, B frontal sinusotomy    TONSILLECTOMY      TYMPANOSTOMY TUBE PLACEMENT      x 2     Social History     Tobacco Use    Smoking status: Never    Smokeless tobacco: Current     Types: Chew    Tobacco comments:     Smoker for maybe a year   Substance Use Topics    Alcohol use: Yes     Comment: occ    Drug use: No       /80 (BP Location: Left arm)   Pulse 107   Temp 98.3 °F (36.8 °C)   Ht 6' 3" (1.905 m)   Wt 112 kg (246 lb 14.6 oz)   BMI 30.86 kg/m²     Review of Systems   Constitutional:  Negative for activity change, appetite change, chills, diaphoresis, fatigue, fever and unexpected weight change.   HENT:          Still dealing with sinus problems//seeing Dr. Noland   Eyes:  Negative for visual disturbance.   Respiratory:  Negative for cough, chest tightness, shortness of breath and wheezing.    Cardiovascular:  Negative for chest pain, palpitations and leg swelling.   Gastrointestinal:  Negative for abdominal distention and abdominal pain.   Genitourinary:  Negative for difficulty urinating.   Musculoskeletal:  Negative for arthralgias and back pain.   Neurological:  Negative for dizziness, weakness and headaches.       Objective:     Physical Exam  Vitals and nursing " note reviewed.   Constitutional:       General: He is not in acute distress.     Appearance: He is well-developed. He is not diaphoretic.   HENT:      Right Ear: External ear normal.      Left Ear: External ear normal.      Nose: Nose normal.   Eyes:      Conjunctiva/sclera: Conjunctivae normal.      Pupils: Pupils are equal, round, and reactive to light.   Neck:      Thyroid: No thyromegaly.   Cardiovascular:      Rate and Rhythm: Normal rate and regular rhythm.      Heart sounds: Normal heart sounds. No murmur heard.  Pulmonary:      Effort: Pulmonary effort is normal. No respiratory distress.      Breath sounds: Normal breath sounds. No wheezing.   Abdominal:      General: Bowel sounds are normal. There is no distension.      Palpations: Abdomen is soft.      Tenderness: There is no abdominal tenderness. There is no guarding.   Musculoskeletal:      Cervical back: Normal range of motion and neck supple.      Right lower leg: No edema.      Left lower leg: No edema.   Skin:     General: Skin is warm and dry.      Findings: No rash.   Neurological:      Mental Status: He is alert and oriented to person, place, and time.      Cranial Nerves: No cranial nerve deficit.   Psychiatric:         Mood and Affect: Mood normal.         Behavior: Behavior normal.         Thought Content: Thought content normal.         Judgment: Judgment normal.         Lab Results   Component Value Date    WBC 7.00 12/06/2022    HGB 15.2 12/06/2022    HCT 44.5 12/06/2022     12/06/2022    CHOL 199 12/06/2022    TRIG 135 12/06/2022    HDL 38 (L) 12/06/2022    ALT 36 12/06/2022    AST 31 12/06/2022     12/06/2022    K 4.6 12/06/2022     12/06/2022    CREATININE 0.9 12/06/2022    BUN 9 12/06/2022    CO2 21 (L) 12/06/2022    TSH 1.459 12/06/2022    INR 1.0 06/14/2021    HGBA1C 4.9 12/06/2022       Assessment:     1. Routine general medical examination at a health care facility         Plan:     Routine general medical  examination at a health care facility      Labs deferred to next year  Cont per specialty  Healthy lifestyle  Fu annually and PRN

## 2024-02-18 ENCOUNTER — PATIENT MESSAGE (OUTPATIENT)
Dept: OTOLARYNGOLOGY | Facility: CLINIC | Age: 44
End: 2024-02-18
Payer: COMMERCIAL

## 2024-03-01 ENCOUNTER — CLINICAL SUPPORT (OUTPATIENT)
Dept: AUDIOLOGY | Facility: CLINIC | Age: 44
End: 2024-03-01
Payer: COMMERCIAL

## 2024-03-01 ENCOUNTER — OFFICE VISIT (OUTPATIENT)
Dept: OTOLARYNGOLOGY | Facility: CLINIC | Age: 44
End: 2024-03-01
Payer: COMMERCIAL

## 2024-03-01 VITALS — BODY MASS INDEX: 32.4 KG/M2 | WEIGHT: 260.56 LBS | HEIGHT: 75 IN | TEMPERATURE: 98 F

## 2024-03-01 DIAGNOSIS — H90.3 ASYMMETRIC SNHL (SENSORINEURAL HEARING LOSS): Primary | ICD-10-CM

## 2024-03-01 DIAGNOSIS — H93.13 TINNITUS, BILATERAL: ICD-10-CM

## 2024-03-01 DIAGNOSIS — H90.3 SENSORINEURAL HEARING LOSS, ASYMMETRICAL: Primary | ICD-10-CM

## 2024-03-01 DIAGNOSIS — H93.13 TINNITUS OF BOTH EARS: ICD-10-CM

## 2024-03-01 PROCEDURE — 3008F BODY MASS INDEX DOCD: CPT | Mod: CPTII,S$GLB,, | Performed by: PHYSICIAN ASSISTANT

## 2024-03-01 PROCEDURE — 99214 OFFICE O/P EST MOD 30 MIN: CPT | Mod: S$GLB,,, | Performed by: PHYSICIAN ASSISTANT

## 2024-03-01 PROCEDURE — 99999 PR PBB SHADOW E&M-EST. PATIENT-LVL I: CPT | Mod: PBBFAC,,, | Performed by: AUDIOLOGIST-HEARING AID FITTER

## 2024-03-01 PROCEDURE — 92557 COMPREHENSIVE HEARING TEST: CPT | Mod: S$GLB,,, | Performed by: AUDIOLOGIST-HEARING AID FITTER

## 2024-03-01 PROCEDURE — 1159F MED LIST DOCD IN RCRD: CPT | Mod: CPTII,S$GLB,, | Performed by: PHYSICIAN ASSISTANT

## 2024-03-01 PROCEDURE — 92567 TYMPANOMETRY: CPT | Mod: S$GLB,,, | Performed by: AUDIOLOGIST-HEARING AID FITTER

## 2024-03-01 PROCEDURE — 99999 PR PBB SHADOW E&M-EST. PATIENT-LVL III: CPT | Mod: PBBFAC,,, | Performed by: PHYSICIAN ASSISTANT

## 2024-03-01 NOTE — PROGRESS NOTES
Referring provider: Concha Brown PA-C    Eugenie Bennett was seen 03/01/2024 for an audiological evaluation.  Patient complains of hearing loss.  He reports hearing loss in both ears since at least 2008.  He has not noted any difference in hearing between the ears, with either ear being the better hearing ear.  He has noted constant tinnitus in both ears for years; ringing sound, uses fan at night so he can sleep.  He has not had any recent issues with ear pain or ear drainage.  He is unsure about family history of hearing loss, and had two previous otologic surgeries (two sets of tubes in childhood).  He has a history of significant loud noise exposure (occupational and firearms). He denies issues with dizziness.  He complains that he asks others to repeat often and his family is complaining about his hearing and TV volume being too loud.  He had an asymmetry noted on his last audiogram in 2022; states he could not tolerate Open MRI (even w/Valium) due to claustrophobia.     Results reveal a mild-to-moderate sensorineural hearing loss 250-8000 Hz for the right ear, and a mild-to-moderate sensorineural hearing loss 250-8000 Hz for the left ear.   Speech Reception Thresholds were 25 dBHL for the right ear and 25 dBHL for the left ear.   Word recognition scores were excellent for the right ear and good for the left ear.   Tympanograms were Type Ad for the right ear and Type Ad for the left ear.    Patient was counseled on the above findings.    Recommendations:  ENT

## 2024-03-01 NOTE — PROGRESS NOTES
Subjective     Patient ID: Eugenie Bennett is a 43 y.o. male.    Chief Complaint: Hearing Loss (Pt c/o bilateral hearing loss.  Unable to do MRI-OPEN AND CLOSED)    Patient is a very pleasant 43 y.o. male here to see me today for evaluation of hearing loss.  He reports hearing loss in both ears since at least 2008.  He has not noted any difference in hearing between the ears, with either ear being the better hearing ear.  He has noted constant tinnitus in both ears for years; ringing sound, uses fan at night so he can sleep.  He has not had any recent issues with ear pain or ear drainage.  He is unsure about family history of hearing loss, and had two previous otologic surgeries (two sets of tubes in childhood).  He has a history of significant loud noise exposure (occupational and firearms). He denies issues with dizziness.  He complains that he asks others to repeat often and his family is complaining about his hearing and TV volume being too loud.  He had an asymmetry noted on his last audiogram in 2022; states he could not tolerate Open MRI (even w/Valium) due to claustrophobia.            Review of Systems   HENT:  Positive for hearing loss and tinnitus. Negative for ear discharge and ear pain.    Neurological:  Negative for dizziness.          Objective     Physical Exam  Constitutional:       Appearance: Normal appearance.   HENT:      Head: Normocephalic and atraumatic.      Right Ear: Tympanic membrane, ear canal and external ear normal. No middle ear effusion. Tympanic membrane is not erythematous.      Left Ear: Tympanic membrane, ear canal and external ear normal.  No middle ear effusion. Tympanic membrane is not erythematous.      Nose: Nose normal.      Mouth/Throat:      Mouth: Mucous membranes are moist.      Dentition: Abnormal dentition.   Pulmonary:      Effort: Pulmonary effort is normal.   Neurological:      General: No focal deficit present.      Mental Status: He is alert.   Psychiatric:          Behavior: Behavior is cooperative.       AUDIOGRAM today:  Unable to upload today's due to EPIC issue  No drastic change compared to his audiogram from 12/2022.         Assessment and Plan     1. Asymmetric SNHL (sensorineural hearing loss)    2. Tinnitus of both ears        We reviewed the recent audiogram, and the fact that there is a distinct asymmetry of at least 20 dB across 3 frequencies.  We discussed that as humans we are not entirely symmetric, and that many people have one ear that hears better than the other.  He attempted MRI of the IAC with contrast but could not complete due to claustrophobia (even with premedicating w/Valium).  We discussed that imaging was to evaluate for any retrocochlear lesions.  He voices understanding but states he will not tolerate another attempt at MRI.  He wants to proceed with hearing aid trial and understands that we could be missing a retrocochlear lesion without imaging and that his hearing loss could continue to decline.  He again voiced understanding.  I recommend he continue to follow with annual audiograms.      We reviewed his audiogram together in detail.  We also discussed that tinnitus is most often caused by a hearing loss, and that as the hair cells are damaged, either genetic or as a result of loud noise exposure, they then cause tinnitus.  Some patients find that restricting the salt or caffeine in their diet helps, and there is also an OTC supplement, lipoflavinoids, that some people find to be effective though their benefit is not fully proven.  Tinnitus tends to be louder in times of stress and fatigue, and may decrease with time.  Sound machines may also be an effective masking technique if needed at night.           No follow-ups on file.

## 2024-03-15 ENCOUNTER — CLINICAL SUPPORT (OUTPATIENT)
Dept: AUDIOLOGY | Facility: CLINIC | Age: 44
End: 2024-03-15
Payer: COMMERCIAL

## 2024-03-15 DIAGNOSIS — H90.3 SENSORINEURAL HEARING LOSS (SNHL), BILATERAL: Primary | ICD-10-CM

## 2024-03-15 NOTE — PROGRESS NOTES
Eugenie Bennett was seen 03/15/2024 for a hearing aid consult to discuss hearing aids. Patient is accompanied by his wife. He works in noise environment 8 hours per day. Explained he needs to wear hearing protection around 85dBA or greater. Currently streams music via earbuds to block noise. 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 Advised he download a sound level meter munir to ensure the cab offers enough sound attenuation. Explained electronic hearing protection with BT is available, but his wife did not want to discuss that today but rather focus on hearing aids.     Patient and his wife describe difficulty with conversational speech at home and social environments. He plays television loudly and often asks for repetition. They ride motorcycles and participate in group activities. They have children at home. He uses Android cell phone. He also has bothersome tinnitus. I reviewed plan options, hearing aid technology levels, features and styles for communication, tinnitus and moisture protection since he is outdoors often. I recommend rechargeable NICK water-resistant and he agrees with this plan. Also briefly discussed Widex for Mario; however, concern for moisture and phone compatibility. I recommend Premium or Advanced level technology. He has an 80/20 benefit with [employer]. He will check into insurance coverage providers. Pending results, they may choose to return out of pocket with me. They have Care Credit. Patient is provided copy of his audiograms and hearing aid information packet.     The following aids were recommended:    A pair of Phonak Audeo L90 or 70 -RL  Color: TBD  Speaker Units:  TBD  Domes:  TBD

## (undated) DEVICE — MANIFOLD 4 PORT

## (undated) DEVICE — SPONGE PATTY SURGICAL .5X3IN

## (undated) DEVICE — STAPLER SEPTAL ENTACT

## (undated) DEVICE — GLOVE BIOGEL 7.5

## (undated) DEVICE — SEE MEDLINE ITEM 152739

## (undated) DEVICE — HANDSWITCH SUCTION COAG

## (undated) DEVICE — NDL HYPO 27G X 1 1/2

## (undated) DEVICE — SYR B-D DISP CONTROL 10CC100/C

## (undated) DEVICE — KIT ANTIFOG

## (undated) DEVICE — SEE MEDLINE ITEM 157027

## (undated) DEVICE — SEE MEDLINE ITEM 157166

## (undated) DEVICE — SPLINT NASAL AIRWAY SEPTAL SIL

## (undated) DEVICE — SYR 10CC LUER LOCK

## (undated) DEVICE — ELECTRODE REM PLYHSV RETURN 9

## (undated) DEVICE — SEE MEDLINE ITEM 152622

## (undated) DEVICE — COVER OVERHEAD SURG LT BLUE